# Patient Record
Sex: MALE | Race: WHITE | Employment: OTHER | URBAN - METROPOLITAN AREA
[De-identification: names, ages, dates, MRNs, and addresses within clinical notes are randomized per-mention and may not be internally consistent; named-entity substitution may affect disease eponyms.]

---

## 2017-06-09 DIAGNOSIS — Z76.0 ENCOUNTER FOR MEDICATION REFILL: ICD-10-CM

## 2017-06-09 RX ORDER — ALBUTEROL SULFATE 90 UG/1
AEROSOL, METERED RESPIRATORY (INHALATION)
Qty: 18 INHALER | Refills: 1 | Status: SHIPPED | OUTPATIENT
Start: 2017-06-09 | End: 2017-07-21

## 2017-07-21 DIAGNOSIS — Z76.0 ENCOUNTER FOR MEDICATION REFILL: ICD-10-CM

## 2017-07-21 RX ORDER — ALBUTEROL SULFATE 90 UG/1
AEROSOL, METERED RESPIRATORY (INHALATION)
Qty: 18 INHALER | Refills: 1 | Status: SHIPPED | OUTPATIENT
Start: 2017-07-21 | End: 2017-08-14

## 2017-07-21 NOTE — TELEPHONE ENCOUNTER
ventolin inhaler---has medication check appointment scheduled for 8/11/17 and is going out of town as soon as this can be refilled.

## 2017-08-14 ENCOUNTER — OFFICE VISIT (OUTPATIENT)
Dept: FAMILY MEDICINE | Facility: CLINIC | Age: 72
End: 2017-08-14

## 2017-08-14 VITALS
HEART RATE: 88 BPM | SYSTOLIC BLOOD PRESSURE: 122 MMHG | BODY MASS INDEX: 23.51 KG/M2 | WEIGHT: 165 LBS | OXYGEN SATURATION: 92 % | DIASTOLIC BLOOD PRESSURE: 80 MMHG

## 2017-08-14 DIAGNOSIS — J41.8 MIXED SIMPLE AND MUCOPURULENT CHRONIC BRONCHITIS (H): Primary | ICD-10-CM

## 2017-08-14 DIAGNOSIS — J44.1 COPD EXACERBATION (H): ICD-10-CM

## 2017-08-14 DIAGNOSIS — M1A.0790 IDIOPATHIC CHRONIC GOUT OF FOOT WITHOUT TOPHUS, UNSPECIFIED LATERALITY: ICD-10-CM

## 2017-08-14 LAB
% GRANULOCYTES: 70.8 % (ref 42.2–75.2)
HCT VFR BLD AUTO: 41.1 % (ref 39–51)
HEMOGLOBIN: 13.8 G/DL (ref 13.4–17.5)
LYMPHOCYTES NFR BLD AUTO: 11.9 % (ref 20.5–51.1)
MCH RBC QN AUTO: 32.3 PG (ref 27–31)
MCHC RBC AUTO-ENTMCNC: 33.6 G/DL (ref 33–37)
MCV RBC AUTO: 96.1 FL (ref 80–100)
MONOCYTES NFR BLD AUTO: 17.3 % (ref 1.7–9.3)
PLATELET # BLD AUTO: 183 K/UL (ref 140–450)
RBC # BLD AUTO: 4.27 X10/CMM (ref 4.2–5.9)
WBC # BLD AUTO: 4.6 X10/CMM (ref 3.8–11)

## 2017-08-14 PROCEDURE — 36415 COLL VENOUS BLD VENIPUNCTURE: CPT | Performed by: FAMILY MEDICINE

## 2017-08-14 PROCEDURE — 85025 COMPLETE CBC W/AUTO DIFF WBC: CPT | Performed by: FAMILY MEDICINE

## 2017-08-14 PROCEDURE — 99214 OFFICE O/P EST MOD 30 MIN: CPT | Performed by: FAMILY MEDICINE

## 2017-08-14 PROCEDURE — 80053 COMPREHEN METABOLIC PANEL: CPT | Mod: 90 | Performed by: FAMILY MEDICINE

## 2017-08-14 RX ORDER — ALBUTEROL SULFATE 0.83 MG/ML
1 SOLUTION RESPIRATORY (INHALATION) EVERY 6 HOURS
Qty: 90 VIAL | Refills: 3 | Status: SHIPPED | OUTPATIENT
Start: 2017-08-14 | End: 2020-03-09 | Stop reason: ALTCHOICE

## 2017-08-14 NOTE — PROGRESS NOTES
Problem(s) Oriented visit        SUBJECTIVE:                                                    Jah Tate is a 72 year old male who presents to clinic today for the following health issues :    1. 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  Has not had any recent breathing troubles beyond usual baseline.  Has not any acute respiratory events.  Remains with intermittent cough, mild shortness of breath with overexertion as per usual.  Using medication as directed with reported side effects.      2. Idiopathic chronic gout of foot without tophus, unspecified laterality  No recent gout flares, tolerating the allopurinol well with no side effects.           Problem list, Medication list, Allergies, and Medical/Social/Surgical histories reviewed in EPIC and updated as appropriate.   Additional history: as documented    ROS:  General and CV completed and negative except as noted above    Histories:   Patient Active Problem List   Diagnosis     COPD (chronic obstructive pulmonary disease) (H)     Health Care Home     Influenza B     Past Surgical History:   Procedure Laterality Date     FRACTURE TX, ANKLE RT/LT      left- w/plate       Social History   Substance Use Topics     Smoking status: Current Every Day Smoker     Packs/day: 0.25     Years: 50.00     Types: Cigarettes     Smokeless tobacco: Never Used      Comment: cutting down     Alcohol use No      Comment: 4-5 drinks per month     No family history on file.        OBJECTIVE:                                                    /80  Pulse 88  Wt 74.8 kg (165 lb)  SpO2 92%  BMI 23.51 kg/m2  Body mass index is 23.51 kg/(m^2).   APPEARANCE: = Relaxed and in no distress  Conj/Eyelids = noninjected and lids and lashes are without inflammation  PERRLA/Irises = Pupils Round Reactive to Light and Irisis without inflammation  Neck = No anterior or posterior adenopathy appreciated.  Thyroid = Not enlarged and no masses  felt  Resp effort = Calm regular breathing  Breath Sounds =  distant breath sounds  Heart Rate, Rythym, & sounds (no Murm)  = Regular rate and rythym with no S3, S4, or murmer appreciated.  Carotid Art's = Pulses full and equal and no bruits appreciated  Abdomen = Soft, nontender, no masses, & bowel sounds in all quadrants  Liver/Spleen = Normal span and no splenomegaly noted  Digits and Nails = FROM in all finger joints, no nail dystrophy  Ext (edema) = No pretibial edema noted or elsewhere  Musculsktl =  Strength and ROM of major joints are within normal limits  SKIN = absent significant rashes or lesions   Recent/Remote Memory = Alert and Oriented x 3  Mood/Affect = Cooperative and interested     ASSESSMENT/PLAN:                                                    Tobacco Cessation:   reports that he has been smoking Cigarettes.  He has a 12.50 pack-year smoking history. He has never used smokeless tobacco.  Tobacco Cessation Action Plan: Self help information given to patient    Declined immunizations: Td due to Concerns about side effects/safety    Jah was seen today for shortness of breath and recheck medication.    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 Diagnoses of Idiopathic chronic gout of foot without tophus, unspecified laterality and COPD exacerbation (H) were also pertinent to this visit.      Regular exercise  There are no Patient Instructions on file for this visit.    The following health maintenance items are reviewed in Epic and correct as of today:  Health Maintenance   Topic Date Due     TETANUS IMMUNIZATION (SYSTEM ASSIGNED)  01/08/1961     COLON CANCER SCREEN (SYSTEM  ASSIGNED)  01/08/1993     ADVANCE DIRECTIVE PLANNING Q5 YRS  01/08/1998     COPD ACTION PLAN Q1 YR  12/24/2014     FIT Q1 YR  03/31/2015     FALL RISK ASSESSMENT  12/21/2016     LIPID SCREEN Q5 YR MALE (SYSTEM ASSIGNED)  07/17/2017     INFLUENZA VACCINE (SYSTEM ASSIGNED)  09/01/2017     SPIROMETRY ONETIME  Completed     PNEUMOCOCCAL  Completed     AORTIC ANEURYSM SCREENING (SYSTEM ASSIGNED)  Completed       Eric Johnson MD  [facility]  [facility]  [phone]    For any issues my office # is [phone]

## 2017-08-14 NOTE — MR AVS SNAPSHOT
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  Normal or non-critical lab and imaging results will be communicated to you by [employer], letter or phone within 4 business days after the clinic has received the results. If you do not hear from us within 7 days, please contact the clinic through [employer] or phone. If you have a critical or abnormal lab result, we will notify you by phone as soon as possible.  Submit refill requests through [employer] or call your pharmacy and they will forward the refill request to us. Please allow 3 business days for your refill to be completed.          Additional Information About Your Visit        MyChart Information     [employer] lets you send messages to your doctor, view your test results, renew your prescriptions, schedule appointments and more. To sign up, go to [email or web address] . Click on \"Log in\" on the left side of the screen, which will take you to the Welcome page. Then click on \"Sign up Now\" on the right side of the page.     You will be asked to enter the access code listed below, as well as some personal information. Please follow the directions to create your username and password.     Your access code is: [email or web address]  Expires: 2017 11:55 AM     Your access code will  in 90 days. If you need help or a new code, please " call your [place] clinic or [phone].        Care EveryWhere ID     This is your Care EveryWhere ID. This could be used by other organizations to access your [place] medical records  [id number]        Your Vitals Were     Pulse Pulse Oximetry BMI (Body Mass Index)             88 92% 23.51 kg/m2          Blood Pressure from Last 3 Encounters:   08/14/17 122/80   06/14/16 138/60   02/09/16 150/70    Weight from Last 3 Encounters:   08/14/17 74.8 kg (165 lb)   06/14/16 79.4 kg (175 lb)   02/09/16 82.6 kg (182 lb)              Today, you had the following     No orders found for display         Where to get your medicines      These medications were sent to [id number] - [place], [place] - [address]  [address] [address]     Phone:  [phone]     albuterol (2.5 MG/3ML) 0.083% neb solution          Primary Care Provider Office Phone # Fax #    Eric Johnson [phone] [address]  [facility] [place] [address]        Equal Access to Services     [facility] : Hadii aad ku hadlaurao Soswathi, waaxda luqadaha, qaybta kaalmada adeangelayada, toni martínez. So [facility] [phone].    ATENCIÓN: Si habla español, tiene a walters disposición servicios gratuitos de asistencia lingüística. Anat al [phone].    We comply with applicable federal civil rights laws and [place] laws. We do not discriminate on the basis of race, color, national origin, age, disability sex, sexual orientation or gender identity.            Thank you!     Thank you for choosing [facility]  for your care. Our goal is always to provide you with excellent care. Hearing back from our patients is one way we can continue to improve our services. Please take a few minutes to complete the written survey that you may receive in the mail after your visit with us. Thank you!             Your Updated Medication List - Protect others around you: Learn how to safely use,  store and throw away your medicines at www.disposemymeds.org.          This list is accurate as of: 8/14/17 11:55 AM.  Always use your most recent med list.                   Brand Name Dispense Instructions for use Diagnosis    ADVAIR DISKUS 250-50 MCG/DOSE diskus inhaler   Generic drug:  fluticasone-salmeterol     3 Inhaler    INHALE 1 PUFF INTO THE LUNGS 2 TIMES DAILY    Encounter for medication refill       albuterol (2.5 MG/3ML) 0.083% neb solution     90 vial    Take 1 vial (2.5 mg) by nebulization every 6 hours    COPD exacerbation (H)       allopurinol 100 MG tablet    ZYLOPRIM    90 tablet    TAKE 1 TABLET (100 MG) BY MOUTH DAILY    Encounter for medication refill       aspirin 81 MG tablet      Take 1 tablet by mouth daily        CENTRUM PO      Take 1 tablet by mouth daily.        colchicine 0.6 MG tablet    COLCRYS    60 tablet    Take 1 tablet (0.6 mg) by mouth 2 times daily    Encounter for medication refill       COMBIVENT RESPIMAT  MCG/ACT inhaler   Generic drug:  Ipratropium-Albuterol     1 Inhaler    INHALE 1 PUFF INTO THE LUNGS 4 TIMES DAILY NOT TO EXCEED 6 DOSES PER DAY.    Encounter for medication refill       DULCOLAX STOOL SOFTENER 100 MG capsule   Generic drug:  docusate sodium      Take 1 capsule by mouth daily as needed        LIVER DEFENSE PO      Take 1 capsule by mouth daily        MUCINEX 600 MG 12 hr tablet   Generic drug:  guaiFENesin     28 tablet    Take 2 tablets (1,200 mg) by mouth daily        triamcinolone 0.1 % cream    KENALOG    45 g    Apply  topically 3 times daily.    Contact dermatitis and other eczema due to plants (except food)       vitamin E 800 UNITS Caps      Take 1 tablet by mouth daily.

## 2017-08-15 LAB
ALBUMIN SERPL-MCNC: 3.6 G/DL (ref 3.5–4.8)
ALBUMIN/GLOB SERPL: 1.5 {RATIO} (ref 1.2–2.2)
ALP SERPL-CCNC: 71 IU/L (ref 39–117)
ALT SERPL-CCNC: 51 IU/L (ref 0–44)
AST SERPL-CCNC: 68 IU/L (ref 0–40)
BILIRUB SERPL-MCNC: 0.4 MG/DL (ref 0–1.2)
BUN SERPL-MCNC: 6 MG/DL (ref 8–27)
BUN/CREATININE RATIO: 11 (ref 10–24)
CALCIUM SERPL-MCNC: 8.6 MG/DL (ref 8.6–10.2)
CHLORIDE SERPLBLD-SCNC: 98 MMOL/L (ref 96–106)
CREAT SERPL-MCNC: 0.55 MG/DL (ref 0.76–1.27)
EGFR IF AFRICN AM: 119 ML/MIN/1.73
EGFR IF NONAFRICN AM: 103 ML/MIN/1.73
GLOBULIN, TOTAL: 2.4 G/DL (ref 1.5–4.5)
GLUCOSE SERPL-MCNC: 81 MG/DL (ref 65–99)
POTASSIUM SERPL-SCNC: 4.2 MMOL/L (ref 3.5–5.2)
PROT SERPL-MCNC: 6 G/DL (ref 6–8.5)
SODIUM SERPL-SCNC: 142 MMOL/L (ref 134–144)
TOTAL CO2: 30 MMOL/L (ref 18–28)

## 2017-08-21 ENCOUNTER — TELEPHONE (OUTPATIENT)
Dept: FAMILY MEDICINE | Facility: CLINIC | Age: 72
End: 2017-08-21

## 2017-08-21 DIAGNOSIS — R74.8 ELEVATED LIVER ENZYMES: Primary | ICD-10-CM

## 2017-08-21 NOTE — TELEPHONE ENCOUNTER
----- Message from Eric Johnson MD sent at 8/21/2017  8:07 AM CDT -----  His LFT's are slightly elevated and I would like to recheck them in a few weeks.  KN

## 2017-08-21 NOTE — TELEPHONE ENCOUNTER
"Patient informed of lab results and need for recheck.  Asked patient about etoh intake, states \"probably drink more than I should\" - he did not quantify. Agrees to abstain and repeat LFT's in 2 weeks. Future order in epic.  Sharri Browning RN  "

## 2017-08-25 DIAGNOSIS — Z76.0 ENCOUNTER FOR MEDICATION REFILL: ICD-10-CM

## 2017-08-25 RX ORDER — ALBUTEROL SULFATE 90 UG/1
AEROSOL, METERED RESPIRATORY (INHALATION)
Qty: 18 INHALER | Refills: 1 | Status: SHIPPED | OUTPATIENT
Start: 2017-08-25 | End: 2017-09-20

## 2017-09-20 ENCOUNTER — DOCUMENTATION ONLY (OUTPATIENT)
Dept: PHARMACY | Facility: PHYSICIAN GROUP | Age: 72
End: 2017-09-20

## 2017-09-20 DIAGNOSIS — J41.8 MIXED SIMPLE AND MUCOPURULENT CHRONIC BRONCHITIS (H): Primary | ICD-10-CM

## 2017-09-20 DIAGNOSIS — Z76.0 ENCOUNTER FOR MEDICATION REFILL: ICD-10-CM

## 2017-09-20 RX ORDER — ALBUTEROL SULFATE 90 UG/1
AEROSOL, METERED RESPIRATORY (INHALATION)
Qty: 18 G | Refills: 1 | Status: SHIPPED | OUTPATIENT
Start: 2017-09-20 | End: 2018-01-13

## 2017-09-20 NOTE — PROGRESS NOTES
Clinical Consult:                                                    Jah Tate is a 72 year old male called for a clinical pharmacist consult.  He was referred to me from Yehuda- .     Reason for Consult: Pt calling for more refills on his ventolin, but should have some left over and is using very frequently. MTM asked to contact patient on inhaler use. Pt is currently out of scope for MTM.     Discussion: Patient is taking Advair 250/50 BID, Combivent 1-2 times per day and Ventolin 4 times per day or more as needed for shortness of breath.   He reports the Ventolin works the best, that is why he is using it more.  He just filled 3 months of the Combivent inhaler, but is NOT taking is scheduled at this time.    Discussed ingredients of the inhalers- should continue to use Advair BID, suggest scheduled use of Combivent QID and limit Ventolin for rare use.     After using up the Combivent (closer to December) he should be switched to LAMA inhaler such as Incruse or Spiriva for controller with the Ventolin PRN.     Plan:  1. Schedule use of Combivent QID  2. Refill for ventolin ordered, but encouraged limiting for PRN use  3. Consider switching Combivent to Incruse in December when out of current supply.    All changes made per CPA with Dr. Johnson.     Radha Bejarano, Pharm.D, [facility]  Medication Therapy Management Pharmacist  [phone]

## 2017-09-21 RX ORDER — ALBUTEROL SULFATE 90 UG/1
AEROSOL, METERED RESPIRATORY (INHALATION)
Qty: 18 INHALER | Refills: 1 | Status: SHIPPED | OUTPATIENT
Start: 2017-09-21 | End: 2020-03-09

## 2017-09-25 DIAGNOSIS — Z76.0 ENCOUNTER FOR MEDICATION REFILL: ICD-10-CM

## 2017-10-13 DIAGNOSIS — Z23 NEED FOR PROPHYLACTIC VACCINATION AND INOCULATION AGAINST INFLUENZA: Primary | ICD-10-CM

## 2017-10-13 PROCEDURE — 90662 IIV NO PRSV INCREASED AG IM: CPT | Performed by: FAMILY MEDICINE

## 2017-10-13 PROCEDURE — G0008 ADMIN INFLUENZA VIRUS VAC: HCPCS | Performed by: FAMILY MEDICINE

## 2017-10-13 NOTE — PROGRESS NOTES
Injectable Influenza Immunization Documentation    1.  Is the person to be vaccinated sick today?   No    2. Does the person to be vaccinated have an allergy to a component   of the vaccine?   No    3. Has the person to be vaccinated ever had a serious reaction   to influenza vaccine in the past?   No    4. Has the person to be vaccinated ever had Guillain-Barré syndrome?   No    Form completed by darlene crump

## 2017-12-26 DIAGNOSIS — Z76.0 ENCOUNTER FOR MEDICATION REFILL: ICD-10-CM

## 2017-12-26 RX ORDER — COLCHICINE 0.6 MG/1
TABLET ORAL
Qty: 60 TABLET | Refills: 0 | Status: SHIPPED | OUTPATIENT
Start: 2017-12-26 | End: 2018-02-03

## 2017-12-27 DIAGNOSIS — J44.9 COPD (CHRONIC OBSTRUCTIVE PULMONARY DISEASE) (H): Primary | ICD-10-CM

## 2017-12-27 DIAGNOSIS — Z76.0 ENCOUNTER FOR MEDICATION REFILL: ICD-10-CM

## 2017-12-27 NOTE — TELEPHONE ENCOUNTER
COMBIVENT RESPIMAT  MCG/ACT inhaler   LAST  Med check 8/14/17   last labs(for RX) 8/14/17  Next  appt scheduled =  none  Deborah Hartman MA

## 2017-12-28 RX ORDER — IPRATROPIUM BROMIDE AND ALBUTEROL 20; 100 UG/1; UG/1
SPRAY, METERED RESPIRATORY (INHALATION)
Qty: 12 G | Refills: 3 | Status: SHIPPED | OUTPATIENT
Start: 2017-12-28 | End: 2018-03-01

## 2018-01-13 DIAGNOSIS — J41.8 MIXED SIMPLE AND MUCOPURULENT CHRONIC BRONCHITIS (H): ICD-10-CM

## 2018-01-14 RX ORDER — ALBUTEROL SULFATE 90 UG/1
AEROSOL, METERED RESPIRATORY (INHALATION)
Qty: 18 INHALER | Refills: 1 | Status: SHIPPED | OUTPATIENT
Start: 2018-01-14 | End: 2018-02-22

## 2018-02-22 DIAGNOSIS — J41.8 MIXED SIMPLE AND MUCOPURULENT CHRONIC BRONCHITIS (H): ICD-10-CM

## 2018-02-22 RX ORDER — ALBUTEROL SULFATE 90 UG/1
AEROSOL, METERED RESPIRATORY (INHALATION)
Qty: 18 INHALER | Refills: 1 | Status: SHIPPED | OUTPATIENT
Start: 2018-02-22 | End: 2018-04-05

## 2018-02-22 NOTE — TELEPHONE ENCOUNTER
VENTOLIN  (90 BASE) MCG/ACT Inhaler   LAST  Med check 8/14/17   last labs(for RX) 8/14/17  Next  appt scheduled =  2/26/18 med ck  Deborah Hartman MA

## 2018-03-01 ENCOUNTER — OFFICE VISIT (OUTPATIENT)
Dept: FAMILY MEDICINE | Facility: CLINIC | Age: 73
End: 2018-03-01

## 2018-03-01 VITALS
SYSTOLIC BLOOD PRESSURE: 130 MMHG | RESPIRATION RATE: 16 BRPM | HEART RATE: 99 BPM | OXYGEN SATURATION: 90 % | BODY MASS INDEX: 25.02 KG/M2 | DIASTOLIC BLOOD PRESSURE: 70 MMHG | WEIGHT: 175.6 LBS

## 2018-03-01 DIAGNOSIS — J44.1 COPD EXACERBATION (H): ICD-10-CM

## 2018-03-01 DIAGNOSIS — Z12.11 SPECIAL SCREENING FOR MALIGNANT NEOPLASMS, COLON: Primary | ICD-10-CM

## 2018-03-01 PROCEDURE — 99214 OFFICE O/P EST MOD 30 MIN: CPT | Performed by: FAMILY MEDICINE

## 2018-03-01 RX ORDER — AZITHROMYCIN 250 MG/1
TABLET, FILM COATED ORAL
Qty: 6 TABLET | Refills: 0 | Status: SHIPPED | OUTPATIENT
Start: 2018-03-01 | End: 2019-05-07

## 2018-03-01 NOTE — PROGRESS NOTES
.  Problem(s) Oriented visit        SUBJECTIVE:                                                    Jah Tate is a 73 year old male who presents to clinic today for the following health issues :  1. Special screening for malignant neoplasms, colon  due  - ABSTRACT COLOGUARD-NO CHARGE    2. COPD exacerbation (H)  positive for cough, dyspnea, wheezing; negative for hemoptysis  Long discussion about smoking.  - azithromycin (ZITHROMAX) 250 MG tablet; Two tablets first day, then one tablet daily for four days.  Dispense: 6 tablet; Refill: 0         Problem list, Medication list, Allergies, and Medical/Social/Surgical histories reviewed in [facility] and updated as appropriate.   Additional history: as documented    ROS:  General and Resp. completed and negative except as noted above    Histories:   Patient Active Problem List   Diagnosis     COPD (chronic obstructive pulmonary disease) (H)     Health Care Home     Influenza B     Past Surgical History:   Procedure Laterality Date     FRACTURE TX, ANKLE RT/LT      left- w/plate       Social History   Substance Use Topics     Smoking status: Current Every Day Smoker     Packs/day: 0.25     Years: 50.00     Types: Cigarettes     Smokeless tobacco: Never Used      Comment: cutting down     Alcohol use No      Comment: 4-5 drinks per month     No family history on file.        OBJECTIVE:                                                    /70  Pulse 99  Resp 16  Wt 79.7 kg (175 lb 9.6 oz)  SpO2 90%  BMI 25.02 kg/m2  Body mass index is 25.02 kg/(m^2).   APPEARANCE: = Relaxed and in no distress  Conj/Eyelids = noninjected and lids and lashes are without inflammation  PERRLA/Irises = Pupils Round Reactive to Light and Irisis without inflammation  Ears/Nose = External structures and Nares have usual shape and form  Ear canals and TM's = Canals are not inflammed and have none or little wax and the drums are not injected and have a light reflex   Lips/Teeth/Gums = No  lesions seen, good dentition, and gums seem healthy  Oropharynx = No leukoplakia, No injection to the tissues, Normal Uvula  Neck = No anterior or posterior adenopathy appreciated.  Resp effort =  Mild Distress  Breath Sounds =  scattered rhonchi, bilateral wheezing and distant breath sounds  Mood/Affect = Cooperative and interested     ASSESSMENT/PLAN:                                                      Jah was seen today for recheck medication and copd.    Diagnoses and all orders for this visit:    Special screening for malignant neoplasms, colon    COPD exacerbation (H)  -     azithromycin (ZITHROMAX) 250 MG tablet; Two tablets first day, then one tablet daily for four days.      >25 min spent with patient, greater than 50% spent on discussion/education/planning, etc. About The primary encounter diagnosis was Special screening for malignant neoplasms, colon. A diagnosis of COPD exacerbation (H) was also pertinent to this visit.      Work on weight loss    The following health maintenance items are reviewed in Epic and correct as of today:  Health Maintenance   Topic Date Due     COPD ACTION PLAN Q1 YR  12/24/2014     FIT Q1 YR  03/31/2015     LIPID SCREEN Q5 YR MALE (SYSTEM ASSIGNED)  07/17/2017     INFLUENZA VACCINE (SYSTEM ASSIGNED)  09/01/2018     FALL RISK ASSESSMENT  03/01/2019     ADVANCE DIRECTIVE PLANNING Q5 YRS  03/01/2023     TETANUS IMMUNIZATION (SYSTEM ASSIGNED)  06/11/2026     SPIROMETRY ONETIME  Completed     PNEUMOCOCCAL  Completed     AORTIC ANEURYSM SCREENING (SYSTEM ASSIGNED)  Completed       Eric Johnson MD  [facility]  Family Practice  [facility]  [phone]    For any issues my office # is [phone]

## 2018-03-01 NOTE — MR AVS SNAPSHOT
After Visit Summary   3/1/2018    Jah Tate    MRN: [id number]           Patient Information     Date Of Birth          1945        Visit Information        Provider Department      3/1/2018 3:30 PM Eric Johnson MD [facility]        Today's Diagnoses     Special screening for malignant neoplasms, colon    -  1    COPD exacerbation (H)          Care Instructions    TIPS FOR QUITTING  There are more than 37 million ex-smokers in the [place] Each one had to make the same decision you re thinking about now.  Smoking cigarettes is an expensive and destructive habit. It s time to stop.  You ve probably heard of all the reasons why you should quit, so we won t dwell on them here. However, you should reflect on the benefits of quitting. When you quit smoking, the body starts to repair itself almost immediately, unless damage has been done that cannot be reversed. Familiar symptoms like shortness of breath, sinus troubles, persistent cough start to disappear.  PREPARING TO QUIT  1. Ask yourself 3 key questions: How much do I smoke? Why do I smoke? What will be my most difficult christopher in quitting?  2. If you re feeling ambivalent about quitting, ask yourself which you want most: to smoke or to stop (Remember, you don t have to get rid of the desire to smoke before stopping)  3. Choose a method of quitting. Cold turkey is the most successful, but a gradual approach is fine.  4. Set a final quit date.  WAYS TO CUT DOWN YOUR SMOKING DAY BY DAY  (NOTE: Do not allow this gradual approach to become a way of procrastinating, rather than quitting)  1. Decide to cut down by a certain number of cigarettes per day, and increase your reduction by that number each succeeding day. OR postpone the 1st cigarette of the day by an hour and extend that time daily.  2. Make it hard to get and smoke a cigarette. Wrap up the package and put elastic bands around it. Smoke with your left hand if you  usually smoke with your right.  3. Change to a brand you don t like.  Buy only one pack @ a time.  4. If you always have a smoke with your coffee, switch to tea, juice or soda.  5. Do something for your body. Get into shape. Exercise is great for relaxation.  6. Call your friends and tell them you re going to quit.(Choose to tell the friends who will offer only positive reinforcement.)  7. If you quit for one day, you can quit for another. Try it.  8. Save all the money you would have spent on cigarettes and buy yourself something special.  You deserve it.  9. If you break down and have a cigarette, don t give up. Some people take several tries before they make it. Just don t have a 2nd cigarette.  ON THE DAY YOU QUIT  1. Throw away all cigarettes and matches. Hide Lighters and ashtrays  2. Visit the dentist and have your teeth cleaned to get rid of the tobacco stains. Notice how nice they look and resolve to keep them that way.  3. Make a list of things you d like to buy yourself or someone else. Estimate the cost in terms of packs of cigarettes and out the money aside to buy these presents.  4. Keep very busy on the big day. Go to the movies, exercise, take long walks, go bike riding.  5. Buy yourself a treat or do something special to celebrate.  TIPS FOR STAYING QUIT  1. For the 1st few days after you quit, spend as much time as possible in places where smoking is prohibited-libraries,museums,theaters,churches.  2. Drink large quantities of water and fruit juice  3. Avoid alcohol, coffee and other beverages that you associate with smoking.  4. Strike up a conversation instead of a match for a cigarette.  5. If you miss the sensation of having a cigarette in your hand, play with something else like a pencil, a paper clip or marble.  6. If you miss having something in our mouth ,try toothpicks,cinnamon,celery or carrots sticks.  7. AVOID TEMPTATION: Stay away from situations you associate with smoking.  8. FIND  NEW HABITS and develop a non-smoking environment around you.  9. Stress constructive, not destructive thinking to lesson discomfort.  10. Avoid resuming the habit by anticipating future situations/crises that might lead to smoking and assert your reasons for not giving in  11. Take deep rhythmic breaths, similar to smoking, to relax.  12. Remember your goal and the fact that the urge will eventually pass.  13. Think positive thoughts and avoid negative ones.  14. Brush your teeth  15. Do brief exercise (isometrics,push-ups,deep knee bends,walk up a flight of stairs)  16. Call a supportive friend  17. Compile a list of  Urge Activities   and start at the top when it hits  18. Eat several small meals. This maintains constant blood sugar levels and helps prevent the urge to smoke. Avoid sugary or spicy foods that trigger a desire for cigarettes.  19. Above all, reward yourself. Plan to do something fun for doing your best  WHEN YOU GET THE   CRAZIES    1. Keep oral substitutes handy: carrots,pickles,apples,celery,raisins or gum.  2. Take 10 deep breaths, hold the last one while lighting  a match. Exhale slowly, and blow out the match. Pretend it is a cigarette and out it out in an ashtray.  3. Take a bath or shower if possible.  4. Learn to relax quickly and deeply.Make yourself limp. Visualize a soothing pleasing situation and get away from it all.  5. Light incense or a candle, instead of a cigarette.  6. Never allow yourself to think that  one won t hurt , because it will.                    Follow-ups after your visit        Who to contact     If you have questions or need follow up information about today's clinic visit or your schedule please contact [facility] directly at [phone].  Normal or non-critical lab and imaging results will be communicated to you by MyChart, letter or phone within 4 business days after the clinic has received the results. If you do not hear from us within 7 days,  "please contact the clinic through [employer] or phone. If you have a critical or abnormal lab result, we will notify you by phone as soon as possible.  Submit refill requests through [employer] or call your pharmacy and they will forward the refill request to us. Please allow 3 business days for your refill to be completed.          Additional Information About Your Visit        [employer] Information     [employer] lets you send messages to your doctor, view your test results, renew your prescriptions, schedule appointments and more. To sign up, go to [email or web address] . Click on \"Log in\" on the left side of the screen, which will take you to the Welcome page. Then click on \"Sign up Now\" on the right side of the page.     You will be asked to enter the access code listed below, as well as some personal information. Please follow the directions to create your username and password.     Your access code is: [email or web address]  Expires: 2018  4:03 PM     Your access code will  in 90 days. If you need help or a new code, please call your [place] clinic or [phone].        Care EveryWhere ID     This is your Care EveryWhere ID. This could be used by other organizations to access your [place] medical records  [id number]        Your Vitals Were     Pulse Respirations Pulse Oximetry BMI (Body Mass Index)          99 16 90% 25.02 kg/m2         Blood Pressure from Last 3 Encounters:   18 130/70   17 122/80   16 138/60    Weight from Last 3 Encounters:   18 79.7 kg (175 lb 9.6 oz)   17 74.8 kg (165 lb)   16 79.4 kg (175 lb)              We Performed the Following     ABSTRACT COLOGUARD-NO CHARGE          Today's Medication Changes          These changes are accurate as of 3/1/18  4:03 PM.  If you have any questions, ask your nurse or doctor.               Start taking these medicines.        Dose/Directions    azithromycin 250 MG tablet   Commonly known as:  ZITHROMAX   Used for:  " COPD exacerbation (H)        Two tablets first day, then one tablet daily for four days.   Quantity:  6 tablet   Refills:  0            Where to get your medicines      These medications were sent to [facility]/pharmacy #[id number] - [place], [place] - [address]  [address] [address]     Phone:  [phone]     azithromycin 250 MG tablet                Primary Care Provider Office Phone # Fax #    Eric Johnson [phone] [address]  [facility] [address]        Equal Access to Services     [facility]: Hadii aad ku hadasho Soomaali, waaxda luqadaha, qaybta kaalmada adeegyada, waxsyd serrato . So [facility] [phone].    ATENCIÓN: Si habla español, tiene a walters disposición servicios gratuitos de asistencia lingüística. [place] [phone].    We comply with applicable federal civil rights laws and [place] laws. We do not discriminate on the basis of race, color, national origin, age, disability, sex, sexual orientation, or gender identity.            Thank you!     Thank you for choosing [facility]  for your care. Our goal is always to provide you with excellent care. Hearing back from our patients is one way we can continue to improve our services. Please take a few minutes to complete the written survey that you may receive in the mail after your visit with us. Thank you!             Your Updated Medication List - Protect others around you: Learn how to safely use, store and throw away your medicines at www.disposemymeds.org.          This list is accurate as of 3/1/18  4:03 PM.  Always use your most recent med list.                   Brand Name Dispense Instructions for use Diagnosis    ADVAIR DISKUS 250-50 MCG/DOSE diskus inhaler   Generic drug:  fluticasone-salmeterol     3 Inhaler    INHALE 1 PUFF INTO THE LUNGS 2 TIMES DAILY    Encounter for medication refill       * albuterol (2.5 MG/3ML) 0.083% neb solution     90 vial    Take 1  vial (2.5 mg) by nebulization every 6 hours    COPD exacerbation (H)       * VENTOLIN  (90 BASE) MCG/ACT Inhaler   Generic drug:  albuterol     18 Inhaler    INHALE 2 PUFFS BY MOUTH EVERY 6 HOURS AS NEEDED FOR SHORTNESS OF BREATH OR WHEEZING    Encounter for medication refill       * VENTOLIN  (90 BASE) MCG/ACT Inhaler   Generic drug:  albuterol     18 Inhaler    INHALE 2 PUFFS BY MOUTH EVERY 6 HOURS AS NEEDED FOR SHORTNESS OF BREATH OR WHEEZING    Mixed simple and mucopurulent chronic bronchitis (H)       allopurinol 100 MG tablet    ZYLOPRIM    30 tablet    TAKE 1 TABLET (100 MG) BY MOUTH DAILY    Encounter for medication refill       aspirin 81 MG tablet      Take 1 tablet by mouth daily        azithromycin 250 MG tablet    ZITHROMAX    6 tablet    Two tablets first day, then one tablet daily for four days.    COPD exacerbation (H)       CENTRUM PO      Take 1 tablet by mouth daily.        COLCRYS 0.6 MG tablet   Generic drug:  colchicine     60 tablet    TAKE 1 TABLET BY MOUTH TWICE A DAY    Encounter for medication refill       COMBIVENT RESPIMAT  MCG/ACT inhaler   Generic drug:  Ipratropium-Albuterol     1 Inhaler    INHALE 1 PUFF INTO THE LUNGS 4 TIMES DAILY NOT TO EXCEED 6 DOSES PER DAY.    Encounter for medication refill       DULCOLAX STOOL SOFTENER 100 MG capsule   Generic drug:  docusate sodium      Take 1 capsule by mouth daily as needed        LIVER DEFENSE PO      Take 1 capsule by mouth daily        MUCINEX 600 MG 12 hr tablet   Generic drug:  guaiFENesin     28 tablet    Take 2 tablets (1,200 mg) by mouth daily        triamcinolone 0.1 % cream    KENALOG    45 g    Apply  topically 3 times daily.    Contact dermatitis and other eczema due to plants (except food)       vitamin E 800 UNITS Caps      Take 1 tablet by mouth daily.        * Notice:  This list has 3 medication(s) that are the same as other medications prescribed for you. Read the directions carefully, and ask your  doctor or other care provider to review them with you.

## 2018-03-01 NOTE — PATIENT INSTRUCTIONS
TIPS FOR QUITTING  There are more than 37 million ex-smokers in the [place] Each one had to make the same decision you re thinking about now.  Smoking cigarettes is an expensive and destructive habit. It s time to stop.  You ve probably heard of all the reasons why you should quit, so we won t dwell on them here. However, you should reflect on the benefits of quitting. When you quit smoking, the body starts to repair itself almost immediately, unless damage has been done that cannot be reversed. Familiar symptoms like shortness of breath, sinus troubles, persistent cough start to disappear.  PREPARING TO QUIT  1. Ask yourself 3 key questions: How much do I smoke? Why do I smoke? What will be my most difficult christopher in quitting?  2. If you re feeling ambivalent about quitting, ask yourself which you want most: to smoke or to stop (Remember, you don t have to get rid of the desire to smoke before stopping)  3. Choose a method of quitting. Cold turkey is the most successful, but a gradual approach is fine.  4. Set a final quit date.  WAYS TO CUT DOWN YOUR SMOKING DAY BY DAY  (NOTE: Do not allow this gradual approach to become a way of procrastinating, rather than quitting)  1. Decide to cut down by a certain number of cigarettes per day, and increase your reduction by that number each succeeding day. OR postpone the 1st cigarette of the day by an hour and extend that time daily.  2. Make it hard to get and smoke a cigarette. Wrap up the package and put elastic bands around it. Smoke with your left hand if you usually smoke with your right.  3. Change to a brand you don t like.  Buy only one pack @ a time.  4. If you always have a smoke with your coffee, switch to tea, juice or soda.  5. Do something for your body. Get into shape. Exercise is great for relaxation.  6. Call your friends and tell them you re going to quit.(Choose to tell the friends who will offer only positive reinforcement.)  7. If you quit for one day,  you can quit for another. Try it.  8. Save all the money you would have spent on cigarettes and buy yourself something special.  You deserve it.  9. If you break down and have a cigarette, don t give up. Some people take several tries before they make it. Just don t have a 2nd cigarette.  ON THE DAY YOU QUIT  1. Throw away all cigarettes and matches. Hide Lighters and ashtrays  2. Visit the dentist and have your teeth cleaned to get rid of the tobacco stains. Notice how nice they look and resolve to keep them that way.  3. Make a list of things you d like to buy yourself or someone else. Estimate the cost in terms of packs of cigarettes and out the money aside to buy these presents.  4. Keep very busy on the big day. Go to the movies, exercise, take long walks, go bike riding.  5. Buy yourself a treat or do something special to celebrate.  TIPS FOR STAYING QUIT  1. For the 1st few days after you quit, spend as much time as possible in places where smoking is prohibited-libraries,museums,theaters,churches.  2. Drink large quantities of water and fruit juice  3. Avoid alcohol, coffee and other beverages that you associate with smoking.  4. Strike up a conversation instead of a match for a cigarette.  5. If you miss the sensation of having a cigarette in your hand, play with something else like a pencil, a paper clip or marble.  6. If you miss having something in our mouth ,try toothpicks,cinnamon,celery or carrots sticks.  7. AVOID TEMPTATION: Stay away from situations you associate with smoking.  8. FIND NEW HABITS and develop a non-smoking environment around you.  9. Stress constructive, not destructive thinking to lesson discomfort.  10. Avoid resuming the habit by anticipating future situations/crises that might lead to smoking and assert your reasons for not giving in  11. Take deep rhythmic breaths, similar to smoking, to relax.  12. Remember your goal and the fact that the urge will eventually pass.  13. Think  positive thoughts and avoid negative ones.  14. Brush your teeth  15. Do brief exercise (isometrics,push-ups,deep knee bends,walk up a flight of stairs)  16. Call a supportive friend  17. Compile a list of  Urge Activities   and start at the top when it hits  18. Eat several small meals. This maintains constant blood sugar levels and helps prevent the urge to smoke. Avoid sugary or spicy foods that trigger a desire for cigarettes.  19. Above all, reward yourself. Plan to do something fun for doing your best  WHEN YOU GET THE   CRAZIES    1. Keep oral substitutes handy: carrots,pickles,apples,celery,raisins or gum.  2. Take 10 deep breaths, hold the last one while lighting  a match. Exhale slowly, and blow out the match. Pretend it is a cigarette and out it out in an ashtray.  3. Take a bath or shower if possible.  4. Learn to relax quickly and deeply.Make yourself limp. Visualize a soothing pleasing situation and get away from it all.  5. Light incense or a candle, instead of a cigarette.  6. Never allow yourself to think that  one won t hurt , because it will.

## 2018-03-08 DIAGNOSIS — Z76.0 ENCOUNTER FOR MEDICATION REFILL: ICD-10-CM

## 2018-03-08 RX ORDER — ALLOPURINOL 100 MG/1
TABLET ORAL
Qty: 30 TABLET | Refills: 0 | Status: SHIPPED | OUTPATIENT
Start: 2018-03-08 | End: 2020-03-09

## 2018-04-01 DIAGNOSIS — Z76.0 ENCOUNTER FOR MEDICATION REFILL: ICD-10-CM

## 2018-04-01 RX ORDER — COLCHICINE 0.6 MG/1
TABLET ORAL
Qty: 60 TABLET | Refills: 0 | Status: SHIPPED | OUTPATIENT
Start: 2018-04-01 | End: 2018-06-03

## 2018-04-04 ENCOUNTER — TELEPHONE (OUTPATIENT)
Dept: FAMILY MEDICINE | Facility: CLINIC | Age: 73
End: 2018-04-04

## 2018-04-05 DIAGNOSIS — J41.8 MIXED SIMPLE AND MUCOPURULENT CHRONIC BRONCHITIS (H): ICD-10-CM

## 2018-04-05 RX ORDER — ALBUTEROL SULFATE 90 UG/1
AEROSOL, METERED RESPIRATORY (INHALATION)
Qty: 18 INHALER | Refills: 1 | Status: SHIPPED | OUTPATIENT
Start: 2018-04-05 | End: 2018-06-05

## 2018-04-05 NOTE — TELEPHONE ENCOUNTER
VENTOLIN  (90 BASE) MCG/ACT Inhaler   LAST  Med check 3/1/18   last labs(for RX) 8/14/17  Next  appt scheduled =  none  Deborah Hartman MA

## 2018-04-05 NOTE — TELEPHONE ENCOUNTER
received form from [facility]--We do not refill prescriptions for oxygen machines, just supplies.  Form was faxed back to them with this infomation.    Yehuda Plummer,   [facility]  [phone]

## 2018-06-03 DIAGNOSIS — Z76.0 ENCOUNTER FOR MEDICATION REFILL: ICD-10-CM

## 2018-06-04 RX ORDER — COLCHICINE 0.6 MG/1
TABLET, FILM COATED ORAL
Qty: 60 TABLET | Refills: 0 | Status: SHIPPED | OUTPATIENT
Start: 2018-06-04 | End: 2018-09-29

## 2018-06-05 DIAGNOSIS — J41.8 MIXED SIMPLE AND MUCOPURULENT CHRONIC BRONCHITIS (H): ICD-10-CM

## 2018-06-05 RX ORDER — ALBUTEROL SULFATE 90 UG/1
AEROSOL, METERED RESPIRATORY (INHALATION)
Qty: 18 INHALER | Refills: 1 | Status: SHIPPED | OUTPATIENT
Start: 2018-06-05 | End: 2018-07-10

## 2018-07-03 NOTE — PROGRESS NOTES
Received fax from [employer] [facility] on 3/1/18.  They are suspending the order for inactivity (60 days without activity).  The order is good for one year and they do not want us to send another order with in the year.  Patient will need to contact [facility] to get a new test mailed to them (if they do not have the first one).  Phone number for the patient is [phone]    Yehuda Plummer,   [facility]  [phone]

## 2018-07-10 DIAGNOSIS — J41.8 MIXED SIMPLE AND MUCOPURULENT CHRONIC BRONCHITIS (H): ICD-10-CM

## 2018-07-10 DIAGNOSIS — Z76.0 ENCOUNTER FOR MEDICATION REFILL: ICD-10-CM

## 2018-07-11 NOTE — TELEPHONE ENCOUNTER
VENTOLIN  (90 Base) MCG/ACT Inhaler  COMBIVENT RESPIMAT  MCG/ACT inhaler   LAST  Med check 3/1/18   last labs(for RX) 8/14/17  Next  appt scheduled =  none  Deborah Hartman MA

## 2018-07-12 RX ORDER — ALBUTEROL SULFATE 90 UG/1
AEROSOL, METERED RESPIRATORY (INHALATION)
Qty: 18 INHALER | Refills: 1 | Status: SHIPPED | OUTPATIENT
Start: 2018-07-12 | End: 2018-08-10

## 2018-07-12 RX ORDER — IPRATROPIUM BROMIDE AND ALBUTEROL 20; 100 UG/1; UG/1
SPRAY, METERED RESPIRATORY (INHALATION)
Qty: 3 INHALER | Refills: 3 | Status: SHIPPED | OUTPATIENT
Start: 2018-07-12 | End: 2019-01-27

## 2018-08-10 DIAGNOSIS — J41.8 MIXED SIMPLE AND MUCOPURULENT CHRONIC BRONCHITIS (H): ICD-10-CM

## 2018-08-12 RX ORDER — ALBUTEROL SULFATE 90 UG/1
AEROSOL, METERED RESPIRATORY (INHALATION)
Qty: 18 INHALER | Refills: 1 | Status: SHIPPED | OUTPATIENT
Start: 2018-08-12 | End: 2018-09-27

## 2018-09-27 DIAGNOSIS — J41.8 MIXED SIMPLE AND MUCOPURULENT CHRONIC BRONCHITIS (H): ICD-10-CM

## 2018-09-27 RX ORDER — ALBUTEROL SULFATE 90 UG/1
AEROSOL, METERED RESPIRATORY (INHALATION)
Qty: 3 INHALER | Refills: 1 | Status: SHIPPED | OUTPATIENT
Start: 2018-09-27 | End: 2018-12-31

## 2018-09-29 DIAGNOSIS — Z76.0 ENCOUNTER FOR MEDICATION REFILL: ICD-10-CM

## 2018-09-30 RX ORDER — COLCHICINE 0.6 MG/1
TABLET, FILM COATED ORAL
Qty: 60 TABLET | Refills: 0 | Status: SHIPPED | OUTPATIENT
Start: 2018-09-30 | End: 2019-01-25

## 2018-12-18 ENCOUNTER — TELEPHONE (OUTPATIENT)
Dept: FAMILY MEDICINE | Facility: CLINIC | Age: 73
End: 2018-12-18

## 2018-12-19 NOTE — TELEPHONE ENCOUNTER
Patient calling multiple times in past 2 days wanting Dr. Johnson to sign order form for [employer]ogen portable oxygen concentrator.   Spoke with patient and Daughter in law, Luli. Patient has COPD will not leave his home due to dyspnea and does not have portable O2. Has concentrator in his home that he uses 24/7. Has not left home in more than one month. Patient states knows Inogen is not likely going to be covered by insurance and willing to pay the nearly $3000 out of pocket.   Discussed with patient and Luli that Dr. Johnson has not seen him since 3/2018 and normal procedure for determining need for portable oxygen would be coming into clinic for visit and 3 step oximetry testing (at rest room air, with activity on RA and with activity on O2). This procedure with desats <89% are required for medicare to cover portable O2 through [employer] or other O2 retailer. States he does have oxygen tanks in his home that he got when discharged from hospital 3 years ago but has never used them. Patient not interested in this and just wants to purchase Inogen asap so can get out of home again.   Plan: Dr. Johnson signed order and but does recommend RTC for visit when gets Inogen and can get out of home again. Patient agrees to make appt.  Sharri Browning RN

## 2018-12-31 DIAGNOSIS — J41.8 MIXED SIMPLE AND MUCOPURULENT CHRONIC BRONCHITIS (H): ICD-10-CM

## 2018-12-31 RX ORDER — ALBUTEROL SULFATE 90 UG/1
AEROSOL, METERED RESPIRATORY (INHALATION)
Qty: 3 INHALER | Refills: 1 | Status: SHIPPED | OUTPATIENT
Start: 2018-12-31 | End: 2019-03-29

## 2019-01-25 DIAGNOSIS — Z76.0 ENCOUNTER FOR MEDICATION REFILL: ICD-10-CM

## 2019-01-25 RX ORDER — COLCHICINE 0.6 MG/1
TABLET, FILM COATED ORAL
Qty: 60 TABLET | Refills: 0 | Status: SHIPPED | OUTPATIENT
Start: 2019-01-25 | End: 2019-02-22

## 2019-01-27 DIAGNOSIS — Z76.0 ENCOUNTER FOR MEDICATION REFILL: ICD-10-CM

## 2019-01-27 RX ORDER — IPRATROPIUM BROMIDE AND ALBUTEROL 20; 100 UG/1; UG/1
SPRAY, METERED RESPIRATORY (INHALATION)
Qty: 12 G | Refills: 3 | Status: SHIPPED | OUTPATIENT
Start: 2019-01-27 | End: 2019-07-07

## 2019-02-22 DIAGNOSIS — Z76.0 ENCOUNTER FOR MEDICATION REFILL: ICD-10-CM

## 2019-02-22 RX ORDER — COLCHICINE 0.6 MG/1
TABLET ORAL
Qty: 60 TABLET | Refills: 0 | Status: SHIPPED | OUTPATIENT
Start: 2019-02-22 | End: 2019-05-21

## 2019-03-15 NOTE — PROGRESS NOTES
3/4/19 Received fax from [employer], it has been 365 days and the patient has not done the test.  The order has not been cancelled.  If the patient decides to proceed with the test, she will need a new order.    Yehuda Plummer,   [facility]  [phone]

## 2019-03-29 ENCOUNTER — TELEPHONE (OUTPATIENT)
Dept: FAMILY MEDICINE | Facility: CLINIC | Age: 74
End: 2019-03-29

## 2019-03-29 DIAGNOSIS — J44.9 COPD (CHRONIC OBSTRUCTIVE PULMONARY DISEASE) (H): Primary | ICD-10-CM

## 2019-03-29 RX ORDER — ALBUTEROL SULFATE 90 UG/1
2 AEROSOL, METERED RESPIRATORY (INHALATION) EVERY 6 HOURS
Qty: 18 G | Refills: 1 | Status: SHIPPED | OUTPATIENT
Start: 2019-03-29 | End: 2020-02-24

## 2019-03-29 NOTE — TELEPHONE ENCOUNTER
Call from patient that he got a letter from [facility] that his Albuterol will not be covered anymore. Letter says Ventolin HFA is on formulary.  Patient has used Ventolin in the past and had good results. Per Dr Alex MYERS to change to Ventolin HFA.  Sent rx to pharmacy and notified patient of change.

## 2019-05-07 ENCOUNTER — TELEPHONE (OUTPATIENT)
Dept: FAMILY MEDICINE | Facility: CLINIC | Age: 74
End: 2019-05-07

## 2019-05-07 NOTE — TELEPHONE ENCOUNTER
Per Jon, patient needs face-to-face with MD to document continued need for oxygen. Patient has not been seen in clinic since 3/2018 -- also due for med check with updated refills and any necessary labs.     Per Jon, oximetry testing is not necessary at this visit, MD needs to document in visit note patient's continued need for oxygen.   Patient informed and apt with Dr. Johnson scheduled for 5/21/19.   Sharri Browning RN

## 2019-05-21 ENCOUNTER — OFFICE VISIT (OUTPATIENT)
Dept: FAMILY MEDICINE | Facility: CLINIC | Age: 74
End: 2019-05-21

## 2019-05-21 VITALS — HEART RATE: 82 BPM | OXYGEN SATURATION: 93 % | DIASTOLIC BLOOD PRESSURE: 72 MMHG | SYSTOLIC BLOOD PRESSURE: 126 MMHG

## 2019-05-21 DIAGNOSIS — J43.2 CENTRILOBULAR EMPHYSEMA (H): Primary | ICD-10-CM

## 2019-05-21 DIAGNOSIS — R60.0 LOCALIZED EDEMA: ICD-10-CM

## 2019-05-21 DIAGNOSIS — Z76.0 ENCOUNTER FOR MEDICATION REFILL: ICD-10-CM

## 2019-05-21 DIAGNOSIS — M1A.0790 IDIOPATHIC CHRONIC GOUT OF FOOT WITHOUT TOPHUS, UNSPECIFIED LATERALITY: ICD-10-CM

## 2019-05-21 DIAGNOSIS — M1A.09X0 CHRONIC GOUT OF MULTIPLE SITES, UNSPECIFIED CAUSE: ICD-10-CM

## 2019-05-21 DIAGNOSIS — F03.90 DEMENTIA WITHOUT BEHAVIORAL DISTURBANCE, UNSPECIFIED DEMENTIA TYPE: ICD-10-CM

## 2019-05-21 LAB
% GRANULOCYTES: 77.7 % (ref 42.2–75.2)
HCT VFR BLD AUTO: 40.8 % (ref 39–51)
HEMOGLOBIN: 13 G/DL (ref 13.4–17.5)
LYMPHOCYTES NFR BLD AUTO: 17.3 % (ref 20.5–51.1)
MCH RBC QN AUTO: 29.6 PG (ref 27–31)
MCHC RBC AUTO-ENTMCNC: 31.9 G/DL (ref 33–37)
MCV RBC AUTO: 92.8 FL (ref 80–100)
MONOCYTES NFR BLD AUTO: 5 % (ref 1.7–9.3)
PLATELET # BLD AUTO: 189 K/UL (ref 140–450)
RBC # BLD AUTO: 4.39 X10/CMM (ref 4.2–5.9)
WBC # BLD AUTO: 7 X10/CMM (ref 3.8–11)

## 2019-05-21 PROCEDURE — 99214 OFFICE O/P EST MOD 30 MIN: CPT | Performed by: FAMILY MEDICINE

## 2019-05-21 PROCEDURE — 36415 COLL VENOUS BLD VENIPUNCTURE: CPT | Performed by: FAMILY MEDICINE

## 2019-05-21 PROCEDURE — 85025 COMPLETE CBC W/AUTO DIFF WBC: CPT | Performed by: FAMILY MEDICINE

## 2019-05-21 RX ORDER — COLCHICINE 0.6 MG/1
TABLET ORAL
Qty: 60 TABLET | Refills: 11 | Status: ON HOLD | OUTPATIENT
Start: 2019-05-21 | End: 2020-04-09

## 2019-05-21 NOTE — PROGRESS NOTES
Problem(s) Oriented visit      Subjective:  CC: Consult (C/O bloating. Denies diarrhea or constipation.); Consult (Bilateral edema, feet X 3 months. Admits to sitting most of the time with feet on the floor. ); and Recheck Medication (O2 is normally at 5. Has new portable machine today which is on 3. )        1. Chronic gout of multiple sites, unspecified cause  No recent gout flares, tolerating the allopurinol well with no side effects.      2. Centrilobular emphysema (H)  COPD remains stable.  Has not had any recent breathing troubles beyond usual baseline.  Has not any acute respiratory events.  Remains with intermittent cough, mild shortness of breath with overexertion as per usual.  Using medication as directed with reported side effects.  He has been using O2 for many years, can't survive without it.  - CBC with Diff/Plt ([facility])  - CARE COORDINATION REFERRAL    3. Idiopathic chronic gout of foot without tophus, unspecified laterality    - CARE COORDINATION REFERRAL    4. Dementia without behavioral disturbance, unspecified dementia type  SUBJECTIVE:  Jah Tate is an 74 year old male who presents for evaluation and treatment   of confusion symptoms. Onset approximately 1.5 years   ago, gradually worsening since that time. Current symptoms include   forgetfulness, social isolation, loss of memory and inability to learn. Family history positive for  dementia in the   patient s mother. Previous treatment modalities   employed include none.      Risk factors: age, smoking  and alcohol use  Organic causes of dementia present: no    ROS:  General and Resp. completed and negative except as noted above     HISTORY:   reports that he does not drink alcohol.   reports that he has been smoking cigarettes.  He has a 12.50 pack-year smoking history. He has never used smokeless tobacco.    Past Medical History:   Diagnosis Date     COPD (chronic obstructive pulmonary disease) (H)      Past Surgical History:    Procedure Laterality Date     FRACTURE TX, ANKLE RT/LT      left- w/plate       EXAM:  BP: 126/72   Pulse: 82    Temp: Data Unavailable    Wt Readings from Last 2 Encounters:   03/01/18 79.7 kg (175 lb 9.6 oz)   08/14/17 74.8 kg (165 lb)       BMI= There is no height or weight on file to calculate BMI.    EXAM:  APPEARANCE: = Relaxed and in no distress  Conj/Eyelids = noninjected and lids and lashes are without inflammation  PERRLA/Irises = Pupils Round Reactive to Light and Irisis without inflammation  Neck = No anterior or posterior adenopathy appreciated.  Thyroid = Not enlarged and no masses felt  Resp effort =  Mild Distress  Breath Sounds =  distant breath sounds  Heart Rate, Rythym, & sounds (no Murm)  = Regular rate and rythym with no S3, S4, or murmer appreciated.  Carotid Art's = Pulses full and equal and no bruits appreciated  Abdomen = Soft, nontender, no masses, & bowel sounds in all quadrants  Liver/Spleen = Normal span and no splenomegaly noted  Digits and Nails = FROM in all finger joints, no nail dystrophy  Ext (edema) = No pretibial edema noted or elsewhere  Musculsktl =  Strength and ROM of major joints are within normal limits  SKIN = absent significant rashes or lesions   Recent/Remote Memory = poor, no month, no date, one president.  World not   NEURO: Normal strength and tone and speech normal  Mood/Affect = Cooperative and interested      Assessment/Plan:  Jah was seen today for consult, consult and recheck medication.    Diagnoses and all orders for this visit:    Centrilobular emphysema (H)  -     CBC with Diff/Plt ([facility])  -     CARE COORDINATION REFERRAL    Chronic gout of multiple sites, unspecified cause    Idiopathic chronic gout of foot without tophus, unspecified laterality  -     CARE COORDINATION REFERRAL    Dementia without behavioral disturbance, unspecified dementia type  -     Vitamin B12 and Folate ([facility])  -     Comp. Metabolic Panel (14) ([facility])  -     CBC with  "Diff/Plt ([facility])  -     CARE COORDINATION REFERRAL    Encounter for medication refill  -     colchicine (COLCYRS) 0.6 MG tablet; TAKE 1 TABLET BY MOUTH TWICE A DAY    Localized edema    Other orders  -     Compression stockings        COUNSELING:   reports that he has been smoking cigarettes.  He has a 12.50 pack-year smoking history. He has never used smokeless tobacco.  Tobacco Cessation Action Plan: Information offered: Patient not interested at this time  Estimated body mass index is 25.02 kg/m  as calculated from the following:    Height as of 4/16/15: 1.784 m (5' 10.25\").    Weight as of 3/1/18: 79.7 kg (175 lb 9.6 oz).       Appropriate preventive services were discussed with this patient, including applicable screening as appropriate for cardiovascular disease, diabetes, osteopenia/osteoporosis, and glaucoma.  As appropriate for age/gender, discussed screening for colorectal cancer, prostate cancer, breast cancer, and cervical cancer. Checklist reviewing preventive services available has been given to the patient.    Reviewed patients plan of care and provided an AVS. The Basic Care Plan (routine screening as documented in Health Maintenance) for Jah meets the Care Plan requirement. This Care Plan has been established and reviewed with the  Patient and spouse.      The following health maintenance items are reviewed in Epic and correct as of today:  Health Maintenance   Topic Date Due     SPIROMETRY  1945     LIPID  01/08/1978     ZOSTER IMMUNIZATION (1 of 2) 01/08/1993     MEDICARE ANNUAL WELLNESS VISIT  01/08/2008     PHQ-2  01/01/2019     FALL RISK ASSESSMENT  03/01/2019     INFLUENZA VACCINE (Season Ended) 09/01/2019     ADVANCED DIRECTIVE PLANNING  03/01/2023     DTAP/TDAP/TD IMMUNIZATION (2 - Td) 06/11/2026     COPD ACTION PLAN  Completed     PNEUMOCOCCAL IMMUNIZATION 65+ LOW/MEDIUM RISK  Completed     IPV IMMUNIZATION  Aged Out     MENINGITIS IMMUNIZATION  Aged Out       Eric Emerson " Alex  [facility]  [phone]       For any issues my office # is [phone]

## 2019-05-22 ENCOUNTER — PATIENT OUTREACH (OUTPATIENT)
Dept: CARE COORDINATION | Facility: CLINIC | Age: 74
End: 2019-05-22

## 2019-05-22 LAB
ALBUMIN SERPL-MCNC: 3.8 G/DL (ref 3.5–4.8)
ALBUMIN/GLOB SERPL: 1.3 {RATIO} (ref 1.2–2.2)
ALP SERPL-CCNC: 73 IU/L (ref 39–117)
ALT SERPL-CCNC: 6 IU/L (ref 0–44)
AST SERPL-CCNC: 12 IU/L (ref 0–40)
BILIRUB SERPL-MCNC: 0.3 MG/DL (ref 0–1.2)
BUN SERPL-MCNC: 10 MG/DL (ref 8–27)
BUN/CREATININE RATIO: 12 (ref 10–24)
CALCIUM SERPL-MCNC: 9.5 MG/DL (ref 8.6–10.2)
CHLORIDE SERPLBLD-SCNC: 98 MMOL/L (ref 96–106)
CREAT SERPL-MCNC: 0.81 MG/DL (ref 0.76–1.27)
EGFR IF AFRICN AM: 100 ML/MIN/1.73
EGFR IF NONAFRICN AM: 86 ML/MIN/1.73
FOLATE: 2.5 NG/ML
GLOBULIN, TOTAL: 2.9 G/DL (ref 1.5–4.5)
GLUCOSE SERPL-MCNC: 97 MG/DL (ref 65–99)
POTASSIUM SERPL-SCNC: 4.7 MMOL/L (ref 3.5–5.2)
PROT SERPL-MCNC: 6.7 G/DL (ref 6–8.5)
SODIUM SERPL-SCNC: 141 MMOL/L (ref 134–144)
TOTAL CO2: 30 MMOL/L (ref 20–29)
VIT B12 SERPL-MCNC: 358 PG/ML (ref 232–1245)

## 2019-05-22 NOTE — PROGRESS NOTES
"Clinic Care Coordination Contact    Referral: \"Care Transition: needs information on next steps for getting out of the house.\"    Data: Writer called patient on 5/22/19. Patient resides with his wife in a house in [place]. Patient said that he and his wife have talked about moving from their house \"eventually\". Patient mentioned that their washer and dryer are in the basement/lower level of their home.     Patient then put his wife on the phone who also stated that she and patient are thinking about moving sometime in the future as their house is getting to be too much for them to care for. Patient and wife would like to move nearer their children who live in [place], [place] and [place], [place]. Patient and wife are not sure, as yet, what type of housing they might want to move to--townhouse, double bungalow, etc. Writer briefly discussed a possible option of moving into an independent apartment within a senior housing complex (so that additional help/services are available if needed in the future).     Wife said that they (pt/wife) would have to spend time clearing out their current home before they could move. Wife was interested in writer mailing a senior housing book so that pt/wife could review housing options.    Plan: Patient and wife are considering a move from their house in the future. Writer will mail a senior housing book to patient.      Marcela Garzon [facility] Care Coordination  Clinic: [facility]   Email: [email or web address]  Tele: [phone]      Addendum  5/24/19  Data: Writer mailed a senior housing book to patient today.    Plan: Patient has writer's contact information if pt or pt's wife would like to review community resources in the future. [facility] plans no further outreach at this time.      Marcela Garzon [facility] Care Coordination  Clinic: [facility]   Email: [email or web address]  Tele: " [phone]

## 2019-05-23 DIAGNOSIS — E53.8 FOLATE DEFICIENCY: Primary | ICD-10-CM

## 2019-05-23 RX ORDER — FOLIC ACID 1 MG/1
1 TABLET ORAL DAILY
Qty: 90 TABLET | Refills: 3 | Status: SHIPPED | OUTPATIENT
Start: 2019-05-23 | End: 2019-07-16

## 2019-05-24 ENCOUNTER — TELEPHONE (OUTPATIENT)
Dept: FAMILY MEDICINE | Facility: CLINIC | Age: 74
End: 2019-05-24

## 2019-05-24 DIAGNOSIS — E53.8 FOLATE DEFICIENCY: Primary | ICD-10-CM

## 2019-05-24 RX ORDER — FOLIC ACID 1 MG/1
1 TABLET ORAL DAILY
Qty: 90 TABLET | Refills: 3 | Status: SHIPPED | OUTPATIENT
Start: 2019-05-24 | End: 2020-09-01

## 2019-05-24 NOTE — TELEPHONE ENCOUNTER
----- Message from Eric Johnson MD sent at 5/23/2019  5:41 PM CDT -----  Lets start him on folate 1 mg daily for his folate deficiency

## 2019-05-24 NOTE — TELEPHONE ENCOUNTER
Called and spoke with patient regarding low folate level. Prescription sent to pharmacy for folic acid. Advised to call clinic with questions. Batsheva Celaya

## 2019-06-05 ENCOUNTER — TELEPHONE (OUTPATIENT)
Dept: FAMILY MEDICINE | Facility: CLINIC | Age: 74
End: 2019-06-05

## 2019-06-05 DIAGNOSIS — J41.8 MIXED SIMPLE AND MUCOPURULENT CHRONIC BRONCHITIS (H): ICD-10-CM

## 2019-06-05 NOTE — TELEPHONE ENCOUNTER
6/3/19 received call from patient and fax from [facility] stating patient needs to RTC for oximetry testing due to changed insurance so now needs to change oxygen providers and has chosen [facility].   Patient has home concentrator and portable O2.   Patient saw Dr. Johnson 5/21/19 and O2 was discussed.   6/5/19 per Elly at [facility], since Dr. Johnson addressed continued need for O2 in his 5/21/19 visit note, patient does not need to RTC of oximetry testing, they will use info from his previous O2 provider. Faxed Elly Johnson's visit note. Called patient and informed no visit needed at this time.   Sharri Browning RN

## 2019-06-06 RX ORDER — ALBUTEROL SULFATE 90 UG/1
AEROSOL, METERED RESPIRATORY (INHALATION)
Qty: 18 G | Refills: 0 | Status: SHIPPED | OUTPATIENT
Start: 2019-06-06 | End: 2019-06-14

## 2019-06-14 DIAGNOSIS — J41.8 MIXED SIMPLE AND MUCOPURULENT CHRONIC BRONCHITIS (H): ICD-10-CM

## 2019-06-14 RX ORDER — ALBUTEROL SULFATE 90 UG/1
AEROSOL, METERED RESPIRATORY (INHALATION)
Qty: 18 INHALER | Refills: 0 | Status: SHIPPED | OUTPATIENT
Start: 2019-06-14 | End: 2019-07-07

## 2019-07-07 DIAGNOSIS — J44.9 COPD (CHRONIC OBSTRUCTIVE PULMONARY DISEASE) (H): Primary | ICD-10-CM

## 2019-07-07 DIAGNOSIS — Z76.0 ENCOUNTER FOR MEDICATION REFILL: ICD-10-CM

## 2019-07-07 DIAGNOSIS — J41.8 MIXED SIMPLE AND MUCOPURULENT CHRONIC BRONCHITIS (H): ICD-10-CM

## 2019-07-07 RX ORDER — ALBUTEROL SULFATE 90 UG/1
AEROSOL, METERED RESPIRATORY (INHALATION)
Qty: 18 INHALER | Refills: 0 | Status: SHIPPED | OUTPATIENT
Start: 2019-07-07 | End: 2019-07-16

## 2019-07-07 RX ORDER — IPRATROPIUM BROMIDE AND ALBUTEROL 20; 100 UG/1; UG/1
SPRAY, METERED RESPIRATORY (INHALATION)
Refills: 3 | Status: CANCELLED | OUTPATIENT
Start: 2019-07-07

## 2019-07-16 ENCOUNTER — OFFICE VISIT (OUTPATIENT)
Dept: FAMILY MEDICINE | Facility: CLINIC | Age: 74
End: 2019-07-16

## 2019-07-16 VITALS
RESPIRATION RATE: 16 BRPM | OXYGEN SATURATION: 92 % | SYSTOLIC BLOOD PRESSURE: 126 MMHG | DIASTOLIC BLOOD PRESSURE: 72 MMHG | HEART RATE: 88 BPM

## 2019-07-16 DIAGNOSIS — F02.80 LATE ONSET ALZHEIMER'S DISEASE WITHOUT BEHAVIORAL DISTURBANCE (H): ICD-10-CM

## 2019-07-16 DIAGNOSIS — J41.8 MIXED SIMPLE AND MUCOPURULENT CHRONIC BRONCHITIS (H): ICD-10-CM

## 2019-07-16 DIAGNOSIS — G30.1 LATE ONSET ALZHEIMER'S DISEASE WITHOUT BEHAVIORAL DISTURBANCE (H): ICD-10-CM

## 2019-07-16 DIAGNOSIS — J44.9 COPD (CHRONIC OBSTRUCTIVE PULMONARY DISEASE) (H): Primary | ICD-10-CM

## 2019-07-16 PROCEDURE — 99214 OFFICE O/P EST MOD 30 MIN: CPT | Performed by: FAMILY MEDICINE

## 2019-07-16 RX ORDER — ALBUTEROL SULFATE 90 UG/1
AEROSOL, METERED RESPIRATORY (INHALATION)
Qty: 18 G | Refills: 11 | Status: SHIPPED | OUTPATIENT
Start: 2019-07-16 | End: 2020-04-06

## 2019-07-16 RX ORDER — IPRATROPIUM BROMIDE AND ALBUTEROL SULFATE 2.5; .5 MG/3ML; MG/3ML
3 SOLUTION RESPIRATORY (INHALATION) 4 TIMES DAILY
COMMUNITY
Start: 2019-06-22 | End: 2020-03-09 | Stop reason: ALTCHOICE

## 2019-07-16 RX ORDER — AZITHROMYCIN 500 MG/1
1 TABLET, FILM COATED ORAL DAILY
Refills: 0 | COMMUNITY
Start: 2019-06-22 | End: 2020-03-09

## 2019-07-16 RX ORDER — PREDNISONE 20 MG/1
2 TABLET ORAL DAILY
Refills: 0 | COMMUNITY
Start: 2019-06-22 | End: 2020-03-09

## 2019-07-16 RX ORDER — CEFUROXIME AXETIL 500 MG/1
1 TABLET ORAL 2 TIMES DAILY
Refills: 0 | COMMUNITY
Start: 2019-06-22 | End: 2020-03-09

## 2019-07-16 NOTE — PROGRESS NOTES
Problem(s) Oriented visit        SUBJECTIVE:                                                    Jah Tate is a 74 year old male who presents to clinic today for the following health issues :            1. Mixed simple and mucopurulent chronic bronchitis (H)  Reviewed hospital notes from [facility]  Finished the AB and prednsison  - albuterol (PROAIR HFA/PROVENTIL HFA/VENTOLIN HFA) 108 (90 Base) MCG/ACT inhaler; INHALE 2 PUFFS BY MOUTH EVERY 6 HOURS AS NEEDED FOR SHORTNESS OF BREATH OR WHEEZING  Dispense: 18 g; Refill: 11         Problem list, Medication list, Allergies, and Medical/Social/Surgical histories reviewed in [facility] and updated as appropriate.   Additional history: as documented    ROS:  General and Resp. completed and negative except as noted above    Histories:   Patient Active Problem List   Diagnosis     COPD (chronic obstructive pulmonary disease) (H)     Health Care Home     Influenza B     Chronic gout of multiple sites     Late onset Alzheimer's disease without behavioral disturbance     Past Surgical History:   Procedure Laterality Date     FRACTURE TX, ANKLE RT/LT      left- w/plate       Social History     Tobacco Use     Smoking status: Current Every Day Smoker     Packs/day: 0.25     Years: 50.00     Pack years: 12.50     Types: Cigarettes     Smokeless tobacco: Never Used     Tobacco comment: cutting down   Substance Use Topics     Alcohol use: No     Comment: 4-5 drinks per month     No family history on file.        OBJECTIVE:                                                    /72   Pulse 88   Resp 16   SpO2 92%   There is no height or weight on file to calculate BMI.   APPEARANCE: = alert and mild distress  Conj/Eyelids = noninjected and lids and lashes are without inflammation  PERRLA/Irises = Pupils Round Reactive to Light and Irisis without inflammation  Ears/Nose = External structures and Nares have usual shape and form  Ear canals and TM's = Canals are not inflammed and  have none or little wax and the drums are not injected and have a light reflex   Lips/Teeth/Gums = No lesions seen, good dentition, and gums seem healthy  Oropharynx = No leukoplakia, No injection to the tissues, Normal Uvula  Neck = No anterior or posterior adenopathy appreciated.  Resp effort =  Mild Distress  Breath Sounds =  distant breath sounds  Mood/Affect = Cooperative and interested     ASSESSMENT/PLAN:                                                        Jah was seen today for hospital f/u.    Diagnoses and all orders for this visit:    Mixed simple and mucopurulent chronic bronchitis (H)  -     albuterol (PROAIR HFA/PROVENTIL HFA/VENTOLIN HFA) 108 (90 Base) MCG/ACT inhaler; INHALE 2 PUFFS BY MOUTH EVERY 6 HOURS AS NEEDED FOR SHORTNESS OF BREATH OR WHEEZING  -     CARE COORDINATION REFERRAL    Late onset Alzheimer's disease without behavioral disturbance    >25 min spent with patient, greater than 50% spent on discussion/education/planning, etc. About Diagnoses of Mixed simple and mucopurulent chronic bronchitis (H) and Late onset Alzheimer's disease without behavioral disturbance were pertinent to this visit.        Regular exercise  Here with daughters       The following health maintenance items are reviewed in Epic and correct as of today:  Health Maintenance   Topic Date Due     ZOSTER IMMUNIZATION (1 of 2) 01/08/1993     MEDICARE ANNUAL WELLNESS VISIT  01/08/2008     LIPID  07/17/2017     PHQ-2  01/01/2019     FALL RISK ASSESSMENT  03/01/2019     INFLUENZA VACCINE (1) 09/01/2019     ADVANCE CARE PLANNING  03/01/2023     DTAP/TDAP/TD IMMUNIZATION (2 - Td) 06/11/2026     SPIROMETRY  Completed     COPD ACTION PLAN  Completed     PNEUMOCOCCAL IMMUNIZATION 65+ LOW/MEDIUM RISK  Completed     IPV IMMUNIZATION  Aged Out     MENINGITIS IMMUNIZATION  Aged Out       Eric Johnson MD  [facility]  Family Practice  [facility]  [phone]    For any issues my office # is  [phone]

## 2019-07-17 ENCOUNTER — PATIENT OUTREACH (OUTPATIENT)
Dept: CARE COORDINATION | Facility: CLINIC | Age: 74
End: 2019-07-17

## 2019-07-18 NOTE — PROGRESS NOTES
Clinic Care Coordination Contact    Referral: Resources for Support. Additional pertinent details:  Needs help with bathing    Data: Orders have been written for home RN, P.T. and Home Health Aide services for patient. Per chart review (HomeCare Advisor), patient's [facility] RN Case Manager is Bobby Gore ([phone]). It appears that the first home care visit is anticipated to be tomorrow, 7/19/19.    Writer called patient today. Patient said that his (pt's) wife is not feeling well today--flu?\    Writer discussed home care services. Patient said that he (pt) is in agreement with home care visits as long as they are covered under his health insurance. Patient said that he (pt) has not received a call from [facility] as yet--and does not believe that his wife has received a phone call.    Writer has left a message in HomeCare Advisor requesting that home care staff contact writer regarding an on-going needs once patient is ready for discharge from home care services.    Plan: Patient is to receive home care services. [facility] will follow for possible need for other community resources.       Marcela Garzon [facility] Care Coordination  Clinics: [facility]   Email: [email or web address]  Tele: [phone]      Addendum  7/18/19  2:10pm  Data: Patient called writer and left a voice message. Patient said that his (patient's) daughter had called him to let him know that patient will be getting a home care visit tomorrow at 11:00 am.    Marcela Garzon [facility] Care Coordination  Clinics: [facility]   Email: [email or web address]  Tele: [phone]

## 2019-07-19 PROCEDURE — G0180 MD CERTIFICATION HHA PATIENT: HCPCS | Performed by: FAMILY MEDICINE

## 2019-07-25 ENCOUNTER — MEDICAL CORRESPONDENCE (OUTPATIENT)
Dept: FAMILY MEDICINE | Facility: CLINIC | Age: 74
End: 2019-07-25

## 2019-07-27 ENCOUNTER — MEDICAL CORRESPONDENCE (OUTPATIENT)
Dept: FAMILY MEDICINE | Facility: CLINIC | Age: 74
End: 2019-07-27

## 2019-07-30 ENCOUNTER — MEDICAL CORRESPONDENCE (OUTPATIENT)
Dept: FAMILY MEDICINE | Facility: CLINIC | Age: 74
End: 2019-07-30

## 2019-08-20 ENCOUNTER — PATIENT OUTREACH (OUTPATIENT)
Dept: FAMILY MEDICINE | Facility: CLINIC | Age: 74
End: 2019-08-20

## 2019-08-20 NOTE — PROGRESS NOTES
Clinic Care Coordination Contact  Chart Review    Situation: Patient chart reviewed by care coordinator.    Background/Assessment: Per chart review (HomeCare Advisor), patient's [facility] services were discontinued on 8/15/19. [facility] did not notify writer of any on-going concerns regarding patient.     Patient's most recent [facility] office visit was on 7/17/19. Per chart review, patient has not been hospitalized or in the Emergency Room since that time.    Plan/Recommendations: [facility] did not relay any concerns about patient prior to patient's discharge from home care services. Writer has talked with patient and patient's wife in the past so they (pt & wife) are aware of the availability of Care Coordination.    [facility] plans no further outreach at this time but remains available if patient/wife wish to review/discuss community resources in the future.    NIURKA Mack  [facility] Care Coordination  Clinics: [facility]   Email: [email or web address]  Tele: [phone]

## 2019-08-21 ENCOUNTER — MEDICAL CORRESPONDENCE (OUTPATIENT)
Dept: FAMILY MEDICINE | Facility: CLINIC | Age: 74
End: 2019-08-21

## 2019-12-17 ENCOUNTER — TELEPHONE (OUTPATIENT)
Dept: FAMILY MEDICINE | Facility: CLINIC | Age: 74
End: 2019-12-17

## 2019-12-17 NOTE — TELEPHONE ENCOUNTER
Received fax from Pain resource management, they are asking provider to sign paperwork for back brace.  Spoke with patient, he did not call them.  They have been calling him off/on for over a year.  He tells them no, but they are not listening.  Faxed back to facility (denied, no not call patient any more)    Yehuda Plummer,   [facility]  [phone]

## 2020-02-24 ENCOUNTER — TELEPHONE (OUTPATIENT)
Dept: FAMILY MEDICINE | Facility: CLINIC | Age: 75
End: 2020-02-24

## 2020-02-24 NOTE — TELEPHONE ENCOUNTER
Pharmacist left message today voicing ongoing concern regarding patient's apparent misuse of his inhalers based on fill patterns at pharmacy. Pharmacist reports in past 3 months patient has filled 6 albuterol inhalers and 8 Stiolto inhalers. Insurance is not covering the cost of these so patient has paid out of pocket $1200 in past 3 months.   Patient filled Stiolto #3 inhalers on 2/1/20 and is calling them today saying he is out and needs more asap. Pharmacist hesitant to continue filling at this rate.   RN called patient with these concerns. Patient agrees it has been a problem for him. Stating his wife tells him he uses it too often and needs to stop this. Says he forgets if he took them so uses again. Asked if his wife is willing to manage these meds for him and give him the inhaler to use at the appropriate times. Patient says yes, she will do this. Asked if I should talk to wife--he says no need. Informed him of amount of money he has spent out of pocket - he agrees and laughs saying he knows and not to say it too loud. Advised patient we will order one Stiolto inhaler at a time for him and it needs to last one month. Patient agrees. Called pharmacist and informed of plan. Pharmacy has valid rx from 2/1/20 for Stiolto #3 inhalers with 3 refills and will change this to dispense one per month. Same with albuterol.  Pharmacy will call us if patient continues to call requesting early fills.   Sharri Browning RN

## 2020-03-05 NOTE — TELEPHONE ENCOUNTER
"Called and spoke with patient regarding inhaler use. He reports that he understands how to use them correctly and will use as prescribed. He reports that he has quit smoking x3 days and feels smoking was part of the overuse issue. He is offered appointment with Radha Hinds to discuss COPD treatment options. He declines at this time and wishes to, \"see how the next week goes\". He reports that his wife is monitoring he usage and he has been taking inhalers as prescribed. This nurse requested that patient call clinic in 1 week with update. Patient agrees. Batsheva Celaya   "

## 2020-03-09 ENCOUNTER — OFFICE VISIT (OUTPATIENT)
Dept: PHARMACY | Facility: PHYSICIAN GROUP | Age: 75
End: 2020-03-09
Payer: COMMERCIAL

## 2020-03-09 DIAGNOSIS — Z99.81 O2 DEPENDENT: ICD-10-CM

## 2020-03-09 DIAGNOSIS — J44.9 COPD (CHRONIC OBSTRUCTIVE PULMONARY DISEASE) (H): Primary | ICD-10-CM

## 2020-03-09 DIAGNOSIS — J42 CHRONIC BRONCHITIS, UNSPECIFIED CHRONIC BRONCHITIS TYPE (H): ICD-10-CM

## 2020-03-09 PROCEDURE — 99207 ZZC NO CHARGE LOS: CPT | Performed by: PHARMACIST

## 2020-03-09 RX ORDER — CHOLECALCIFEROL (VITAMIN D3) 50 MCG
1 TABLET ORAL DAILY
Status: ON HOLD | COMMUNITY
End: 2021-02-05

## 2020-03-09 RX ORDER — IPRATROPIUM BROMIDE AND ALBUTEROL SULFATE 2.5; .5 MG/3ML; MG/3ML
1 SOLUTION RESPIRATORY (INHALATION) EVERY 6 HOURS
Qty: 360 ML | Refills: 2 | Status: ON HOLD | OUTPATIENT
Start: 2020-03-09 | End: 2020-04-09

## 2020-03-09 RX ORDER — BUDESONIDE 0.5 MG/2ML
0.5 INHALANT ORAL 2 TIMES DAILY
Qty: 120 ML | Refills: 2 | Status: SHIPPED | OUTPATIENT
Start: 2020-03-09 | End: 2020-03-31

## 2020-03-09 NOTE — PROGRESS NOTES
03/09/20 1:47 PM Nebulizer supply order faxed to [facility] -- requesting asap delivery.  Sharri Browning RN

## 2020-03-09 NOTE — PROGRESS NOTES
Clinical Pharmacy Consult:                                                    Jah Tate is a 75 year old male seen for a clinical pharmacist consult.  He was referred to me from triage.     Reason for Consult: COPD issues with inhalers.   Discussion:   Over using Stiolto inhaler (see chart notes for details). He is also using frequent albuterol HFA as well.   He has not been using neb machine- states he feels worse if he does use it, but then determined he was not wearing oxygen while using his nebulizer.     Has never replaced cups or tubing for his nebulizer that he got last summer.     On continuous oxygen via nasal canula at 3L in office today, but states it is up to 4-5L at home (they aren't certain about the actual setting though). Using [employer] for supplies.     Recommend changing to mask for nebulization of medication and adding inhaled steroid. Should schedule RHYS/ANDREW therapy and should not stop oxygen while using it.     Could still have albuterol HFA for rescue if needed, but should limit to 4 puff per day or less. He is not seeing pulmonology and would benefit from this as he should also be evaluated for untreated sleep apnea.     Continues to smoke- aware of risks and use with oxygen. Not willing to quit.     Plan:  1. Mask for nebulizer- to use with oxygen. Will send prescription for Duonebs to be used 4 times daily and budesonide 2 times daily.      2. Ventolin PRN for rescue.     3. Stopping Stiolto for now. In the future if he is traveling and cannot use his nebulizer could consider order for stiolto, but he has been over using and would be best to avoid confusion with the nebs at this time.     4. Smoking cessation encouraged.         Radha Bejarano, Pharm.D, [facility]  Medication Therapy Management Pharmacist  [phone]

## 2020-03-26 NOTE — PROGRESS NOTES
Per Radha PharmD -I called Jon to find out when patients mask will be sent to him.  They say it should go out in a day or so. Called and informed patient . Also recommended by Radha that patient sees pulmonary dr.  Patient says at this time he is fine and declines a visit with them.

## 2020-03-27 ENCOUNTER — MEDICAL CORRESPONDENCE (OUTPATIENT)
Dept: FAMILY MEDICINE | Facility: CLINIC | Age: 75
End: 2020-03-27

## 2020-04-05 DIAGNOSIS — J41.8 MIXED SIMPLE AND MUCOPURULENT CHRONIC BRONCHITIS (H): ICD-10-CM

## 2020-04-06 ENCOUNTER — APPOINTMENT (OUTPATIENT)
Dept: CT IMAGING | Facility: CLINIC | Age: 75
DRG: 871 | End: 2020-04-06
Attending: EMERGENCY MEDICINE
Payer: COMMERCIAL

## 2020-04-06 ENCOUNTER — HOSPITAL ENCOUNTER (INPATIENT)
Facility: CLINIC | Age: 75
LOS: 3 days | Discharge: HOME HEALTH | DRG: 871 | End: 2020-04-09
Attending: EMERGENCY MEDICINE | Admitting: INTERNAL MEDICINE
Payer: COMMERCIAL

## 2020-04-06 ENCOUNTER — APPOINTMENT (OUTPATIENT)
Dept: GENERAL RADIOLOGY | Facility: CLINIC | Age: 75
DRG: 871 | End: 2020-04-06
Attending: EMERGENCY MEDICINE
Payer: COMMERCIAL

## 2020-04-06 DIAGNOSIS — J44.9 CHRONIC OBSTRUCTIVE PULMONARY DISEASE, UNSPECIFIED COPD TYPE (H): ICD-10-CM

## 2020-04-06 DIAGNOSIS — Z76.0 ENCOUNTER FOR MEDICATION REFILL: ICD-10-CM

## 2020-04-06 DIAGNOSIS — J44.9 COPD (CHRONIC OBSTRUCTIVE PULMONARY DISEASE) (H): ICD-10-CM

## 2020-04-06 DIAGNOSIS — J41.8 MIXED SIMPLE AND MUCOPURULENT CHRONIC BRONCHITIS (H): ICD-10-CM

## 2020-04-06 DIAGNOSIS — J18.9 COMMUNITY ACQUIRED PNEUMONIA OF LEFT LOWER LOBE OF LUNG: ICD-10-CM

## 2020-04-06 DIAGNOSIS — J42 CHRONIC BRONCHITIS, UNSPECIFIED CHRONIC BRONCHITIS TYPE (H): ICD-10-CM

## 2020-04-06 DIAGNOSIS — J44.1 COPD EXACERBATION (H): Primary | ICD-10-CM

## 2020-04-06 DIAGNOSIS — I48.91 ATRIAL FIBRILLATION, UNSPECIFIED TYPE (H): ICD-10-CM

## 2020-04-06 LAB
ALBUMIN SERPL-MCNC: 3.3 G/DL (ref 3.4–5)
ALBUMIN UR-MCNC: NEGATIVE MG/DL
ALP SERPL-CCNC: 91 U/L (ref 40–150)
ALT SERPL W P-5'-P-CCNC: 14 U/L (ref 0–70)
ANION GAP SERPL CALCULATED.3IONS-SCNC: 6 MMOL/L (ref 3–14)
APPEARANCE UR: CLEAR
AST SERPL W P-5'-P-CCNC: 15 U/L (ref 0–45)
BASE EXCESS BLDV CALC-SCNC: 2.9 MMOL/L
BASOPHILS # BLD AUTO: 0 10E9/L (ref 0–0.2)
BASOPHILS NFR BLD AUTO: 0.3 %
BILIRUB SERPL-MCNC: 0.2 MG/DL (ref 0.2–1.3)
BILIRUB UR QL STRIP: NEGATIVE
BUN SERPL-MCNC: 15 MG/DL (ref 7–30)
CALCIUM SERPL-MCNC: 8.6 MG/DL (ref 8.5–10.1)
CHLORIDE SERPL-SCNC: 105 MMOL/L (ref 94–109)
CO2 SERPL-SCNC: 28 MMOL/L (ref 20–32)
COLOR UR AUTO: NORMAL
CREAT SERPL-MCNC: 0.78 MG/DL (ref 0.66–1.25)
D DIMER PPP FEU-MCNC: 1.4 UG/ML FEU (ref 0–0.5)
DIFFERENTIAL METHOD BLD: ABNORMAL
EOSINOPHIL # BLD AUTO: 0.7 10E9/L (ref 0–0.7)
EOSINOPHIL NFR BLD AUTO: 5.8 %
ERYTHROCYTE [DISTWIDTH] IN BLOOD BY AUTOMATED COUNT: 15.4 % (ref 10–15)
GFR SERPL CREATININE-BSD FRML MDRD: 87 ML/MIN/{1.73_M2}
GLUCOSE SERPL-MCNC: 107 MG/DL (ref 70–99)
GLUCOSE UR STRIP-MCNC: NEGATIVE MG/DL
HCO3 BLDV-SCNC: 31 MMOL/L (ref 21–28)
HCT VFR BLD AUTO: 41.3 % (ref 40–53)
HGB BLD-MCNC: 13 G/DL (ref 13.3–17.7)
HGB UR QL STRIP: NEGATIVE
IMM GRANULOCYTES # BLD: 0.3 10E9/L (ref 0–0.4)
IMM GRANULOCYTES NFR BLD: 2.8 %
INTERPRETATION ECG - MUSE: NORMAL
KETONES UR STRIP-MCNC: NEGATIVE MG/DL
LACTATE BLD-SCNC: 2.2 MMOL/L (ref 0.7–2)
LACTATE BLD-SCNC: 2.5 MMOL/L (ref 0.7–2)
LEUKOCYTE ESTERASE UR QL STRIP: NEGATIVE
LYMPHOCYTES # BLD AUTO: 1.8 10E9/L (ref 0.8–5.3)
LYMPHOCYTES NFR BLD AUTO: 15 %
MCH RBC QN AUTO: 29.1 PG (ref 26.5–33)
MCHC RBC AUTO-ENTMCNC: 31.5 G/DL (ref 31.5–36.5)
MCV RBC AUTO: 93 FL (ref 78–100)
MONOCYTES # BLD AUTO: 1.5 10E9/L (ref 0–1.3)
MONOCYTES NFR BLD AUTO: 12.9 %
NEUTROPHILS # BLD AUTO: 7.5 10E9/L (ref 1.6–8.3)
NEUTROPHILS NFR BLD AUTO: 63.2 %
NITRATE UR QL: NEGATIVE
NRBC # BLD AUTO: 0 10*3/UL
NRBC BLD AUTO-RTO: 0 /100
NT-PROBNP SERPL-MCNC: 479 PG/ML (ref 0–1800)
O2/TOTAL GAS SETTING VFR VENT: 5 %
PCO2 BLDV: 62 MM HG (ref 40–50)
PH BLDV: 7.31 PH (ref 7.32–7.43)
PH UR STRIP: 5 PH (ref 5–7)
PLATELET # BLD AUTO: 93 10E9/L (ref 150–450)
PO2 BLDV: 21 MM HG (ref 25–47)
POTASSIUM SERPL-SCNC: 4 MMOL/L (ref 3.4–5.3)
PROCALCITONIN SERPL-MCNC: 0.05 NG/ML
PROT SERPL-MCNC: 7.1 G/DL (ref 6.8–8.8)
RBC # BLD AUTO: 4.46 10E12/L (ref 4.4–5.9)
SODIUM SERPL-SCNC: 139 MMOL/L (ref 133–144)
SOURCE: NORMAL
SP GR UR STRIP: 1.01 (ref 1–1.03)
TROPONIN I SERPL-MCNC: 0.02 UG/L (ref 0–0.04)
UROBILINOGEN UR STRIP-MCNC: NORMAL MG/DL (ref 0–2)
WBC # BLD AUTO: 11.9 10E9/L (ref 4–11)

## 2020-04-06 PROCEDURE — 71275 CT ANGIOGRAPHY CHEST: CPT

## 2020-04-06 PROCEDURE — 85379 FIBRIN DEGRADATION QUANT: CPT | Performed by: EMERGENCY MEDICINE

## 2020-04-06 PROCEDURE — 83880 ASSAY OF NATRIURETIC PEPTIDE: CPT | Performed by: EMERGENCY MEDICINE

## 2020-04-06 PROCEDURE — 85025 COMPLETE CBC W/AUTO DIFF WBC: CPT | Performed by: EMERGENCY MEDICINE

## 2020-04-06 PROCEDURE — 25000125 ZZHC RX 250: Performed by: EMERGENCY MEDICINE

## 2020-04-06 PROCEDURE — 12000000 ZZH R&B MED SURG/OB

## 2020-04-06 PROCEDURE — 80053 COMPREHEN METABOLIC PANEL: CPT | Performed by: EMERGENCY MEDICINE

## 2020-04-06 PROCEDURE — 71045 X-RAY EXAM CHEST 1 VIEW: CPT

## 2020-04-06 PROCEDURE — 84484 ASSAY OF TROPONIN QUANT: CPT | Performed by: EMERGENCY MEDICINE

## 2020-04-06 PROCEDURE — 96361 HYDRATE IV INFUSION ADD-ON: CPT

## 2020-04-06 PROCEDURE — 87635 SARS-COV-2 COVID-19 AMP PRB: CPT | Performed by: EMERGENCY MEDICINE

## 2020-04-06 PROCEDURE — 25000132 ZZH RX MED GY IP 250 OP 250 PS 637: Performed by: INTERNAL MEDICINE

## 2020-04-06 PROCEDURE — 99292 CRITICAL CARE ADDL 30 MIN: CPT

## 2020-04-06 PROCEDURE — 82803 BLOOD GASES ANY COMBINATION: CPT | Performed by: EMERGENCY MEDICINE

## 2020-04-06 PROCEDURE — 81003 URINALYSIS AUTO W/O SCOPE: CPT | Performed by: EMERGENCY MEDICINE

## 2020-04-06 PROCEDURE — 25000128 H RX IP 250 OP 636: Performed by: EMERGENCY MEDICINE

## 2020-04-06 PROCEDURE — 99291 CRITICAL CARE FIRST HOUR: CPT

## 2020-04-06 PROCEDURE — 96365 THER/PROPH/DIAG IV INF INIT: CPT | Mod: 59

## 2020-04-06 PROCEDURE — 99223 1ST HOSP IP/OBS HIGH 75: CPT | Mod: AI | Performed by: INTERNAL MEDICINE

## 2020-04-06 PROCEDURE — 83605 ASSAY OF LACTIC ACID: CPT | Performed by: EMERGENCY MEDICINE

## 2020-04-06 PROCEDURE — 25800030 ZZH RX IP 258 OP 636: Performed by: EMERGENCY MEDICINE

## 2020-04-06 PROCEDURE — 87040 BLOOD CULTURE FOR BACTERIA: CPT | Performed by: EMERGENCY MEDICINE

## 2020-04-06 PROCEDURE — 96367 TX/PROPH/DG ADDL SEQ IV INF: CPT

## 2020-04-06 PROCEDURE — 25800030 ZZH RX IP 258 OP 636: Performed by: INTERNAL MEDICINE

## 2020-04-06 PROCEDURE — 84145 PROCALCITONIN (PCT): CPT | Performed by: EMERGENCY MEDICINE

## 2020-04-06 PROCEDURE — 93005 ELECTROCARDIOGRAM TRACING: CPT

## 2020-04-06 RX ORDER — LEVOFLOXACIN 5 MG/ML
750 INJECTION, SOLUTION INTRAVENOUS EVERY 24 HOURS
Status: DISCONTINUED | OUTPATIENT
Start: 2020-04-06 | End: 2020-04-06

## 2020-04-06 RX ORDER — ACETAMINOPHEN 325 MG/1
650 TABLET ORAL EVERY 4 HOURS PRN
Status: DISCONTINUED | OUTPATIENT
Start: 2020-04-06 | End: 2020-04-09 | Stop reason: HOSPADM

## 2020-04-06 RX ORDER — MAGNESIUM SULFATE HEPTAHYDRATE 40 MG/ML
2 INJECTION, SOLUTION INTRAVENOUS ONCE
Status: COMPLETED | OUTPATIENT
Start: 2020-04-06 | End: 2020-04-06

## 2020-04-06 RX ORDER — NALOXONE HYDROCHLORIDE 0.4 MG/ML
.1-.4 INJECTION, SOLUTION INTRAMUSCULAR; INTRAVENOUS; SUBCUTANEOUS
Status: DISCONTINUED | OUTPATIENT
Start: 2020-04-06 | End: 2020-04-09 | Stop reason: HOSPADM

## 2020-04-06 RX ORDER — ONDANSETRON 2 MG/ML
4 INJECTION INTRAMUSCULAR; INTRAVENOUS EVERY 6 HOURS PRN
Status: DISCONTINUED | OUTPATIENT
Start: 2020-04-06 | End: 2020-04-09 | Stop reason: HOSPADM

## 2020-04-06 RX ORDER — LIDOCAINE 40 MG/G
CREAM TOPICAL
Status: DISCONTINUED | OUTPATIENT
Start: 2020-04-06 | End: 2020-04-09 | Stop reason: HOSPADM

## 2020-04-06 RX ORDER — ASPIRIN 81 MG/1
81 TABLET ORAL DAILY
Status: DISCONTINUED | OUTPATIENT
Start: 2020-04-07 | End: 2020-04-09 | Stop reason: HOSPADM

## 2020-04-06 RX ORDER — AMOXICILLIN 250 MG
2 CAPSULE ORAL 2 TIMES DAILY PRN
Status: DISCONTINUED | OUTPATIENT
Start: 2020-04-06 | End: 2020-04-09 | Stop reason: HOSPADM

## 2020-04-06 RX ORDER — BISACODYL 10 MG
10 SUPPOSITORY, RECTAL RECTAL DAILY PRN
Status: DISCONTINUED | OUTPATIENT
Start: 2020-04-06 | End: 2020-04-09 | Stop reason: HOSPADM

## 2020-04-06 RX ORDER — IOPAMIDOL 755 MG/ML
66 INJECTION, SOLUTION INTRAVASCULAR ONCE
Status: COMPLETED | OUTPATIENT
Start: 2020-04-06 | End: 2020-04-06

## 2020-04-06 RX ORDER — AMOXICILLIN 250 MG
1 CAPSULE ORAL 2 TIMES DAILY PRN
Status: DISCONTINUED | OUTPATIENT
Start: 2020-04-06 | End: 2020-04-09 | Stop reason: HOSPADM

## 2020-04-06 RX ORDER — POTASSIUM CHLORIDE 7.45 MG/ML
10 INJECTION INTRAVENOUS
Status: DISCONTINUED | OUTPATIENT
Start: 2020-04-06 | End: 2020-04-08

## 2020-04-06 RX ORDER — ALBUTEROL SULFATE 90 UG/1
AEROSOL, METERED RESPIRATORY (INHALATION)
Qty: 18 INHALER | Refills: 0 | Status: ON HOLD | OUTPATIENT
Start: 2020-04-06 | End: 2020-04-09

## 2020-04-06 RX ORDER — ACETAMINOPHEN 650 MG/1
650 SUPPOSITORY RECTAL EVERY 4 HOURS PRN
Status: DISCONTINUED | OUTPATIENT
Start: 2020-04-06 | End: 2020-04-09 | Stop reason: HOSPADM

## 2020-04-06 RX ORDER — GUAIFENESIN 600 MG/1
600 TABLET, EXTENDED RELEASE ORAL 2 TIMES DAILY
Status: DISCONTINUED | OUTPATIENT
Start: 2020-04-06 | End: 2020-04-08

## 2020-04-06 RX ORDER — ONDANSETRON 4 MG/1
4 TABLET, ORALLY DISINTEGRATING ORAL EVERY 6 HOURS PRN
Status: DISCONTINUED | OUTPATIENT
Start: 2020-04-06 | End: 2020-04-09 | Stop reason: HOSPADM

## 2020-04-06 RX ORDER — LEVOFLOXACIN 5 MG/ML
750 INJECTION, SOLUTION INTRAVENOUS ONCE
Status: COMPLETED | OUTPATIENT
Start: 2020-04-06 | End: 2020-04-06

## 2020-04-06 RX ORDER — ALBUTEROL SULFATE 90 UG/1
1-2 AEROSOL, METERED RESPIRATORY (INHALATION) EVERY 4 HOURS PRN
Status: DISCONTINUED | OUTPATIENT
Start: 2020-04-06 | End: 2020-04-09 | Stop reason: HOSPADM

## 2020-04-06 RX ORDER — LEVOFLOXACIN 750 MG/1
750 TABLET, FILM COATED ORAL DAILY
Status: DISCONTINUED | OUTPATIENT
Start: 2020-04-07 | End: 2020-04-09 | Stop reason: HOSPADM

## 2020-04-06 RX ORDER — POTASSIUM CHLORIDE 1500 MG/1
20-40 TABLET, EXTENDED RELEASE ORAL
Status: DISCONTINUED | OUTPATIENT
Start: 2020-04-06 | End: 2020-04-08

## 2020-04-06 RX ORDER — COLCHICINE 0.6 MG/1
0.6 TABLET ORAL 2 TIMES DAILY
Status: DISCONTINUED | OUTPATIENT
Start: 2020-04-06 | End: 2020-04-08

## 2020-04-06 RX ORDER — POTASSIUM CHLORIDE 29.8 MG/ML
20 INJECTION INTRAVENOUS
Status: DISCONTINUED | OUTPATIENT
Start: 2020-04-06 | End: 2020-04-08

## 2020-04-06 RX ORDER — POTASSIUM CHLORIDE 1.5 G/1.58G
20-40 POWDER, FOR SOLUTION ORAL
Status: DISCONTINUED | OUTPATIENT
Start: 2020-04-06 | End: 2020-04-08

## 2020-04-06 RX ORDER — POTASSIUM CL/LIDO/0.9 % NACL 10MEQ/0.1L
10 INTRAVENOUS SOLUTION, PIGGYBACK (ML) INTRAVENOUS
Status: DISCONTINUED | OUTPATIENT
Start: 2020-04-06 | End: 2020-04-08

## 2020-04-06 RX ORDER — SODIUM CHLORIDE 9 MG/ML
INJECTION, SOLUTION INTRAVENOUS CONTINUOUS
Status: ACTIVE | OUTPATIENT
Start: 2020-04-06 | End: 2020-04-07

## 2020-04-06 RX ADMIN — MAGNESIUM SULFATE HEPTAHYDRATE 2 G: 40 INJECTION, SOLUTION INTRAVENOUS at 16:18

## 2020-04-06 RX ADMIN — IOPAMIDOL 66 ML: 755 INJECTION, SOLUTION INTRAVENOUS at 17:05

## 2020-04-06 RX ADMIN — LEVOFLOXACIN 750 MG: 5 INJECTION, SOLUTION INTRAVENOUS at 17:54

## 2020-04-06 RX ADMIN — COLCHICINE 0.6 MG: 0.6 TABLET, FILM COATED ORAL at 22:53

## 2020-04-06 RX ADMIN — SODIUM CHLORIDE 91 ML: 9 INJECTION, SOLUTION INTRAVENOUS at 17:05

## 2020-04-06 RX ADMIN — SODIUM CHLORIDE, POTASSIUM CHLORIDE, SODIUM LACTATE AND CALCIUM CHLORIDE 2400 ML: 600; 310; 30; 20 INJECTION, SOLUTION INTRAVENOUS at 16:50

## 2020-04-06 RX ADMIN — SODIUM CHLORIDE: 9 INJECTION, SOLUTION INTRAVENOUS at 22:26

## 2020-04-06 RX ADMIN — GUAIFENESIN 600 MG: 600 TABLET, EXTENDED RELEASE ORAL at 22:53

## 2020-04-06 ASSESSMENT — ACTIVITIES OF DAILY LIVING (ADL)
DRESS: 0-->INDEPENDENT
TOILETING: 0-->INDEPENDENT
FALL_HISTORY_WITHIN_LAST_SIX_MONTHS: NO
RETIRED_EATING: 0-->INDEPENDENT
AMBULATION: 0-->INDEPENDENT
SWALLOWING: 0-->SWALLOWS FOODS/LIQUIDS WITHOUT DIFFICULTY
COGNITION: 0 - NO COGNITION ISSUES REPORTED
TRANSFERRING: 0-->INDEPENDENT
BATHING: 0-->INDEPENDENT
RETIRED_COMMUNICATION: 0-->UNDERSTANDS/COMMUNICATES WITHOUT DIFFICULTY

## 2020-04-06 ASSESSMENT — MIFFLIN-ST. JEOR: SCORE: 1468.13

## 2020-04-06 ASSESSMENT — ENCOUNTER SYMPTOMS
FEVER: 0
SHORTNESS OF BREATH: 1

## 2020-04-06 NOTE — ED PROVIDER NOTES
History     Chief Complaint:  Shortness of Breath      HPI   Jah Tate is a 75 year old male, current every day smoker, with a history of COPD requiring home O2 (basleine 3L but can be up to 4-5L), AAA without rupture, and Alzheimer's Disease who presents via EMS for evaluation of shortness of breath. Patient arrives via EMS with NC in place. Patient is coming from his home. Patient reports shortness of breath since 0600 today, prompting his call to EMS. He denies significant increase in his O2 use at home. On their initial exam, EMS heard bilateral wheezing and therefore provided patient with 1 DuoNeb en route. His O2 sats have been about 95% the whole time he has been with EMS. When patient was hooked up to EMS monitor, his heart rate was at 190 and their monitor showed Atrial fibrillation with RVR, which patient states he has never had before. IV was placed prior to patient's arrival to the emergency department, and with IV placement his heart rate went down to ~100bpm. Here, patient denies chest pain.     Allergies:  Penicillins  Sulfa Drugs  Chantix [Varenicline Tartrate]      Medications:    Albuterol inhaler   Aspirin 81mg   Colchicine   Budenoside neb solution   Duoneb     Past Medical History:    COPD   Late onset Alzheimer's Disease without behavioral disturbance   Idiopathic gout   Hypoxia   Colonic diverticular disease   AAA without rupture     Past Surgical History:    ORIF ankle fracture     Family History:    History reviewed. No pertinent family history.      Social History:  The patient was accompanied to the ED by EMS.  Smoking Status: Current every day smoker 1.00 PPD for 60 years  Smokeless Tobacco: Never  Alcohol Use: No   Marital Status:        Review of Systems   Constitutional: Negative for fever.   Respiratory: Positive for shortness of breath.    Cardiovascular: Negative for chest pain.   All other systems reviewed and are negative.        Physical Exam     Patient Vitals  for the past 24 hrs:   BP Heart Rate Resp SpO2   04/06/20 1605 118/83 111 17 99 %       Physical Exam  General: Alert, interactive in mild distress  Head:  Scalp is atraumatic  Eyes:  The pupils are equal, round, and reactive to light    EOM's intact    No scleral icterus  ENT:      Nose:  The external nose is normal  Ears:  External ears are normal  Mouth/Throat: The oropharynx is normal    Mucus membranes are moist       Neck:  Normal range of motion.      There is no rigidity.    Trachea is in the midline         CV:  Tachycardic rate and regular rhythm    No murmur   Resp:  Occasional wheezes with no respiratory distress     Non-labored, no retractions or accessory muscle use      GI:  Abdomen is soft, no distension, no tenderness.       MS:  Normal strength in all 4 extremities. 1+ edema on BLE        Skin:  Warm and dry, No rash or lesions noted.  Neuro: Strength 5/5 x4.  Sensation intact  In all 4 extremities.        Psych:  Awake. Alert.  Normal affect.      Appropriate interactions.      Emergency Department Course     ECG:  Indication: Shortness of breath  Completed at 1559.  Read at 1605.   Sinus tachycardia   Septal infarct  Abnormal ECG   Rate 112 bpm. GA interval 146. QRS duration 88. QT/QTc 334/455. P-R-T axes 83 53 84.    Imaging:  Radiology findings were communicated with the patient and Admitting MD who voiced understanding of the findings.    CT Chest Pulmonary Embolism w Contrast  Preliminary Result  IMPRESSION:  1.  Dense consolidation and/or atelectasis at the posteromedial left  lower lobe. Underlying lobar pneumonia is possible.  2.  Peribronchial wall thickening most consistent with an infectious  or inflammatory cause at the right lung base.  3.  Diffuse emphysema and coronary artery calcifications.  4.  No convincing evidence for pulmonary embolism.   Report per radiology     XR Chest Port 1 View  Final Result  IMPRESSION: Ill-defined opacity newly seen at the left lung base that  may be  atelectasis or new airspace disease. Hyperinflated lungs may be  seen with COPD. Stable cardiac silhouette. There may be small left  pleural fluid as well.  Report per radiology     Laboratory:  Laboratory findings were communicated with the patient and Admitting MD who voiced understanding of the findings.    CBC: WBC 11.9 (H), HGB 13.0 (L), PLT 93 (L)  o/w WNL.  CMP: Glucose 107 (H), Albumin 3.3 (L) o/w WNL (Creatinine 0.78)     Troponin (Collected 1608): 0.016  D-Dimer: 1.4 (H)      BNP: 479   Procalcitonin: 0.05     Lactic Acid (1609): 2.5 (H)   Lactic Acid: (1748): 2.2 (H)       Blood gas venous: pH Venous 7.31 (L), PCO2 Venous 62 (H), PO2 Venous 21 (L), Bicarbonate 31 (H), Base Excess 2.9, FlO2 5L     UA: Light yellow, clear urine. AWNL   COVID-19 Virus (Coronavirus), PCR NP Swab: pending        Blood cultures x2: Pending    Interventions:  1618 Magnesium 2g IV   1650 LR bolus 2,400mL IV    1754 Levaquin 750mg IV     Emergency Department Course:  Past medical records, nursing notes, and vitals reviewed.    1549 Dressed in PPE with ED team.    Patient arrived via EMS.     1550 I received story from EMS.     1552 I performed my examination of the patient as noted above.    Pads preemptively placed in A/P position on patient by respiratory technician.     1555   bpm  /83  SpO2 99% on NC oxygen     1559 EKG obtained in the ED, see results above.      1607 IV was inserted and blood was drawn for laboratory testing, results above.   The patient's nose was swabbed and this sample was sent for COVID testing, findings above.    The patient provided a urine sample and this was sent for laboratory testing, findings above.    1638  The patient underwent portable CXR while in the emergency department, results above.     1751 The patient was sent for a CT Chest PE w Contrast while in the emergency department, results above.     1835 I rechecked the patient and discussed the results of his workup thus far.      8907 I spoke with Dr. Scott of the Hospitalist service regarding patient's presentation, findings, and plan of care.    Findings and plan explained to the Patient who consents to admission. Discussed the patient with Dr. Scott, who will admit the patient to an inpatient medical bed in the COVID unit for further monitoring, evaluation, and treatment.    I personally reviewed the laboratory and imaging results with the Patient and answered all related questions prior to admission.     Impression & Plan     CMS Diagnoses:  The patient has signs of Severe Sepsis REMINDER: Please use septic shock SmartPhrase for Lactate > 4 or a patient requiring vasopressors after initial fluid bolus (meaning persistent hypotension)      If one the following conditions is present, a 30 mL/kg bolus is recommended as part of the 6 hour bundle (IBW can be used for BMI >30, or document refusal/contraindication):      1.   Initial hypotension  defined as 2 bps < 90 or map < 65 in the 6hrs before or 6hrs after time zero.     2.  Lactate >4.     The patient has signs of Severe Sepsis as evidenced by:    1. 2 SIRS criteria, AND  2. Suspected infection, AND   3. Organ dysfunction: Lactic Acid > 2.0    Time severe sepsis diagnosis confirmed: 1609  04/06/20 as this was the time when Lactate resulted, and the level was > 2.0    3 Hour Severe Sepsis Bundle Completion:    1. Initial Lactic Acid Result:   Recent Labs   Lab Test 04/06/20  1748 04/06/20  1609   LACT 2.2* 2.5*     2. Blood Cultures before Antibiotics: Yes  3. Broad Spectrum Antibiotics Administered:  yes       Anti-infectives (From admission through now)    Start     Dose/Rate Route Frequency Ordered Stop    04/06/20 1702  levofloxacin (LEVAQUIN) infusion 750 mg      750 mg  100 mL/hr over 90 Minutes Intravenous ONCE 04/06/20 1701            4. Fluid volume administered in ED:  Full 30 mL/kg bolus given (see amount below).    BMI Readings from Last 1 Encounters:   03/01/18  25.02 kg/m                    Severe Sepsis reassessment:  1. Repeat Lactic Acid Level: 2.2  2. MAP>65 after initial IVF bolus, will continue to monitor fluid status and vital signs    I attest to having performed a repeat sepsis exam and assessment of perfusion at 1835 and the results demonstrate improved perfusion.     Covid-19  Jah Tate was evaluated during a global COVID-19 pandemic, which necessitated consideration that the patient might be at risk for infection with the SARS-CoV-2 virus that causes COVID-19.   Applicable protocols for evaluation were followed during the patient's care.    Medical Decision Making:  Jah Tate is a 75 year old male who presents to the emergency department today with shortness of breath. Mr. Tate was seen and evaluated. The above work up was undertaken demonstrating left lower lobe infiltrate consistent with pneumonia. He received Levofloxacin as he is allergic to penicillins. He also received magnesium, however I do not believe that he needs bronchodilators at this point in time given the risk for potential COVID and aerosolizing procedures. He has no pain to suggest acute coronary syndrome. Of note, he was noted to be tachycardic for the paramedics. They thought that this was Atrial fibrillation with RVR; patient has no previous diagnosis of this and he remained a sinus rhythm throughout his stay here without intervention. Patient's initial lactic acid was elevated at 2.5. this trended downwards with IV fluids. Patient was admitted under the care of Dr. Scott and colleagues for further evaluation and treatment.     Diagnosis:    ICD-10-CM    1. Community acquired pneumonia of left lower lobe of lung (H)  J18.1    2. Chronic obstructive pulmonary disease, unspecified COPD type (H)  J44.9    3. Atrial fibrillation, unspecified type (H)  I48.91        Disposition:  Admitted to an inpatient medical bed in the COVID unit.    Scribe Disclosure:  I,  Danya Granados, am serving as a scribe at 3:49 PM on 4/6/2020 to document services personally performed by Chente Weber MD based on my observations and the provider's statements to me.    4/6/2020    EMERGENCY DEPARTMENT       Chente Weber MD  04/06/20 1955

## 2020-04-06 NOTE — ED NOTES
Bed: [facility]  Expected date: 4/6/20  Expected time: 3:44 PM  Means of arrival: Ambulance  Comments:  438 75m SVT, SOB  ETA [phone]

## 2020-04-06 NOTE — H&P
"[facility]    History and Physical  Hospitalist       Date of Admission:  4/6/2020  Date of Service (when I saw the patient): 04/06/20    Assessment & Plan   Jah Tate is a 75 year old male who presents with shortness of breath    Left lower lobe Pneumonia  COPD/ emphysema  Acute hypercarbic respiratory failure  COVID rule out  PTA albuterol prn, pulmicort nebs BID, duonebs q6 hours  5L O2 dependent COPD. Poor historian. Appears to have worsening SOB this am. EMS arrived, O2 sats ok (on 5L O2). Afebrile, tachy. Troponin negative, procalcitonin negative. BNP normal. Lactate 2.2. VBG 7.31/62/21/31. WBC 11.9. CT with dense consolidation and/or atelectasis in LLL, peribronchial wall thickening most consistent with an infectious or inflammatory cause in R lung base, emphysema, no PE.   - repeat lactate in the am  - COVID pending  - blood cultures pending  - continue levofloxacin 750 mg IV daily  - combivent inhaler QID  - albuterol inhaler q4 hours prn wheezing/ SOB  - no steroids  - mucinex BID  - IV fluids overnight    Tachycardia  Reported HR to 180's when medics arrived, thought possible afib but not captured and improved by time EMS was getting IV access. ED with HR in the 's, sinus.   - telemetry  - check EKG    Edema  Symmetric edema on exam, 2-3+. States \"comes and goes\" with recumbency. BNP as above is normal  - monitor, IV fluids overnight then discontinue    Thrombocytopenia  Noted plts at 93 on admission. Previous platelets 189 5/2019.  - repeat CBC in am    Gout  PTA colchicine 0.6 mg BID, continue    Tobacco abuse  Still currently smoking 1 PPD. States started smoking at 13 years of age  - ongoing encourage cessation    Alzheimer dementia  Per outpatient notes. Pt is poor historian.     DVT Prophylaxis: Pneumatic Compression Devices  Code Status: Full Code    Disposition: Expected discharge in 2-3 days pending improvement in respiratory status    Vijay Scott, " MD  [phone] (P)  Text Page     Primary Care Physician   Dr. Eric Johnson    Chief Complaint   Shortness of breath    History is obtained from the patient and medical records.  Please note the patient is a very poor historian and history is mostly obtained from the records    History of Present Illness   Jah Tate is a 75 year old male who presents with shortness of breath.  He has a past medical history remarkable for COPD/emphysema on 5 L of oxygen chronically, ongoing tobacco abuse, gout, and early dementia.  Patient reports chronic shortness of breath that gets better and worse over time.  He is unable to describe exactly what happened today that prompted EMS being contacted.  There apparently was not an increase in his O2 needs at home.  EMS arrived and heard bilateral wheezing on exam.  He was given a DuoNeb.  They noted his O2 sats are in the 95% range the entire time.  There is report that when he was hooked up to monitor his heart rate was in the 180s and looked like atrial fibrillation.  However when IV was being placed his heart rate dropped into the 100s and was regular.  The emergency department he denies any chest pain.  He states he is still short of breath.  He complains of cough.  He denies any fevers or chills.  Denies diaphoresis.  He has no known sick contacts.  No nausea or vomiting.  No abdominal pain.    Past Medical History    I have reviewed this patient's medical history and updated it with pertinent information if needed.   Past Medical History:   Diagnosis Date     COPD (chronic obstructive pulmonary disease) (H)    Alzheimer dementia  Gout    Past Surgical History   I have reviewed this patient's surgical history and updated it with pertinent information if needed.  Past Surgical History:   Procedure Laterality Date     FRACTURE TX, ANKLE RT/LT      left- w/plate       Prior to Admission Medications   Prior to Admission Medications   Prescriptions Last Dose Informant  "Patient Reported? Taking?   albuterol (PROAIR HFA/PROVENTIL HFA/VENTOLIN HFA) 108 (90 Base) MCG/ACT inhaler   No No   Sig: INHALE 2 PUFFS BY MOUTH EVERY 6 HOURS AS NEEDED FOR SHORTNESS OF BREATH OR WHEEZING   aspirin 81 MG tablet  Self Yes No   Sig: Take 1 tablet by mouth daily   budesonide (PULMICORT) 0.5 MG/2ML neb solution   No No   Sig: TAKE 2 MLS (0.5 MG) BY NEBULIZATION 2 TIMES DAILY   colchicine (COLCYRS) 0.6 MG tablet   No No   Sig: TAKE 1 TABLET BY MOUTH TWICE A DAY   docusate sodium (DULCOLAX STOOL SOFTENER) 100 MG capsule  Self Yes No   Sig: Take 2 capsules by mouth daily as needed    folic acid (FOLVITE) 1 MG tablet   No No   Sig: Take 1 tablet (1 mg) by mouth daily   guaiFENesin (MUCINEX) 600 MG 12 hr tablet   Yes No   Sig: Take 600 mg by mouth every other day    ipratropium - albuterol 0.5 mg/2.5 mg/3 mL (DUONEB) 0.5-2.5 (3) MG/3ML neb solution   No No   Sig: Take 1 vial (3 mLs) by nebulization every 6 hours   vitamin D3 (CHOLECALCIFEROL) 2000 units (50 mcg) tablet   Yes No   Sig: Take 1 tablet by mouth daily   vitamin E 800 UNIT CAPS  Self Yes No   Sig: Take 1 tablet by mouth daily 450 mg daily      Facility-Administered Medications: None     Allergies   Allergies   Allergen Reactions     Penicillins Swelling     Sulfa Drugs Swelling     Chantix [Varenicline Tartrate]      Hallucinations         Social History   I have reviewed this patient's social history and updated it with pertinent information if needed. Jah Tate  reports that he has been smoking cigarettes. He has a 60.00 pack-year smoking history. He has never used smokeless tobacco. He reports that he does not drink alcohol or use drugs.    Family History   I have reviewed this patient's family history and updated it with pertinent information if needed.   Patient reports \"heart stuff \"in his family but is not more specific    Review of Systems   The 10 point Review of Systems is negative other than noted in the HPI or here.  " Patient is a poor historian    Physical Exam       BP: 118/83   Heart Rate: 106 Resp: 26 SpO2: 100 % O2 Device: Nasal cannula Oxygen Delivery: 5 LPM  Vital Signs with Ranges  0 lbs 0 oz    Constitutional: alert, oriented and in mild respiratory distress  Eyes: EOMI, PERRL  HEENT: OP clear  Respiratory: Breath sounds were distant but I did not hear any wheezing.  No rales or crackles were appreciated.  Cardiovascular: Tachycardia but regular, distant.   2-3+ edema symmetric.  GI: soft, nontender, nondistended, no HSM  Lymph/Hematologic: no cervical LAD  Genitourinary: deferred  Skin: no rashes or lesions grossly  Musculoskeletal: no deformities or arthritis  Neurologic: CN II-XII, MADRIGAL, sensation grossly intact  Psychiatric: mood and affect wnl    Data   Data reviewed today:  I personally reviewed the chest CT image(s) showing Left lower lobe pneumonia, possible right lower lobe pneumonia..  Recent Labs   Lab 04/06/20  1608   WBC 11.9*   HGB 13.0*   MCV 93   PLT 93*      POTASSIUM 4.0   CHLORIDE 105   CO2 28   BUN 15   CR 0.78   ANIONGAP 6   SHEEBA 8.6   *   ALBUMIN 3.3*   PROTTOTAL 7.1   BILITOTAL 0.2   ALKPHOS 91   ALT 14   AST 15   TROPI 0.016       Recent Results (from the past 24 hour(s))   XR Chest Port 1 View    Narrative    CHEST PORTABLE ONE VIEW   4/6/2020 4:38 PM     HISTORY: Shortness of breath.    COMPARISON: 4/4/2015.      Impression    IMPRESSION: Ill-defined opacity newly seen at the left lung base that  may be atelectasis or new airspace disease. Hyperinflated lungs may be  seen with COPD. Stable cardiac silhouette. There may be small left  pleural fluid as well.    DIANE MUNIZ MD   CT Chest Pulmonary Embolism w Contrast    Narrative    CT CHEST PULMONARY EMBOLISM WITH CONTRAST 4/6/2020 5:40 PM    CLINICAL HISTORY: PE suspected, intermediate probability, positive  D-dimer. Shortness of breath.    TECHNIQUE: CT angiogram chest during arterial phase injection IV  contrast. 2D and 3D MIP  reconstructions were performed by the CT  technologist. Dose reduction techniques were used.     CONTRAST: 66mL Isovue-370    COMPARISON: Chest x-ray 4/6/2020, CT abdomen and pelvis 8/2/2013.    FINDINGS:  ANGIOGRAM CHEST: Pulmonary arteries are normal caliber and negative  for pulmonary emboli. Thoracic aorta is negative for dissection.  Diffuse coronary artery calcifications.    LUNGS AND PLEURA: Trace left pleural fluid. Dense consolidation and/or  atelectasis along the posteromedial left lower lobe. Atelectasis at  the posterior right lower lobe. Diffuse emphysematous changes.  Peribronchial wall thickening at the right lower lobe base.    MEDIASTINUM/AXILLAE: A few mildly prominent mediastinal lymph nodes  are noted.    UPPER ABDOMEN: No acute abnormality.    MUSCULOSKELETAL: No acute abnormality.      Impression    IMPRESSION:  1.  Dense consolidation and/or atelectasis at the posteromedial left  lower lobe. Underlying lobar pneumonia is possible.  2.  Peribronchial wall thickening most consistent with an infectious  or inflammatory cause at the right lung base.  3.  Diffuse emphysema and coronary artery calcifications.  4.  No convincing evidence for pulmonary embolism.     DIANE MUNIZ MD

## 2020-04-06 NOTE — ED NOTES
[facility]  ED Nurse Handoff Report    ED Chief complaint: Shortness of Breath      ED Diagnosis:   Final diagnoses:   None       Code Status: Full Code    Allergies:   Allergies   Allergen Reactions     Penicillins Swelling     Sulfa Drugs Swelling     Chantix [Varenicline Tartrate]      Hallucinations         Patient Story: Shortness of breath starting this AM around 0600. Hx of COPD, continues to smoke. Pt felt SOB and wheezy, worsening this afternoon that prompted call to EMS.   Focused Assessment:  Denies CP, sinus tach on monitor. Pt c/o SOB, worsening over the past few days. Diffuse wheezes throughout, no respiratory distress. A&O x4. Denies pain.    Treatments and/or interventions provided: 2400ml LR bolus, 750mg levaquin IV, 2g mag sulfate.  Patient's response to treatments and/or interventions: Lactic trending down.    To be done/followed up on inpatient unit:  n/a    Does this patient have any cognitive concerns?: None    Activity level - Baseline/Home:  Independent  Activity Level - Current:   Unknown    Patient's Preferred language: English   Needed?: No    Isolation: Contact and Droplet- COVID-19 R/o  Infection: Not Applicable  Other- COVID-19 pending  Bariatric?: No    Vital Signs:   Vitals:    04/06/20 1605 04/06/20 1645   BP: 118/83    Resp: 17 26   SpO2: 99% 100%       Cardiac Rhythm:Cardiac Rhythm: Sinus tachycardia    Was the PSS-3 completed:   Yes  What interventions are required if any?               Family Comments: n/a  OBS brochure/video discussed/provided to patient/family: N/a              Name of person given brochure if not patient: n/a              Relationship to patient: n/a    For the majority of the shift this patient's behavior was Green.   Behavioral interventions performed were n/a.    ED NURSE PHONE NUMBER: *[phone]

## 2020-04-07 LAB
ERYTHROCYTE [DISTWIDTH] IN BLOOD BY AUTOMATED COUNT: 15.6 % (ref 10–15)
HCT VFR BLD AUTO: 35.5 % (ref 40–53)
HGB BLD-MCNC: 11.2 G/DL (ref 13.3–17.7)
LACTATE BLD-SCNC: 0.6 MMOL/L (ref 0.7–2)
MCH RBC QN AUTO: 29.1 PG (ref 26.5–33)
MCHC RBC AUTO-ENTMCNC: 31.5 G/DL (ref 31.5–36.5)
MCV RBC AUTO: 92 FL (ref 78–100)
PLATELET # BLD AUTO: 65 10E9/L (ref 150–450)
RBC # BLD AUTO: 3.85 10E12/L (ref 4.4–5.9)
SARS-COV-2 PCR COMMENT: NORMAL
SARS-COV-2 RNA SPEC QL NAA+PROBE: NORMAL
SARS-COV-2 RNA SPEC QL NAA+PROBE: NORMAL
SPECIMEN SOURCE: NORMAL
SPECIMEN SOURCE: NORMAL
WBC # BLD AUTO: 7.6 10E9/L (ref 4–11)

## 2020-04-07 PROCEDURE — 99232 SBSQ HOSP IP/OBS MODERATE 35: CPT | Performed by: INTERNAL MEDICINE

## 2020-04-07 PROCEDURE — 25000125 ZZHC RX 250: Performed by: INTERNAL MEDICINE

## 2020-04-07 PROCEDURE — 25000131 ZZH RX MED GY IP 250 OP 636 PS 637: Performed by: INTERNAL MEDICINE

## 2020-04-07 PROCEDURE — 36415 COLL VENOUS BLD VENIPUNCTURE: CPT | Performed by: INTERNAL MEDICINE

## 2020-04-07 PROCEDURE — 12000000 ZZH R&B MED SURG/OB

## 2020-04-07 PROCEDURE — 85027 COMPLETE CBC AUTOMATED: CPT | Performed by: INTERNAL MEDICINE

## 2020-04-07 PROCEDURE — 83605 ASSAY OF LACTIC ACID: CPT | Performed by: INTERNAL MEDICINE

## 2020-04-07 PROCEDURE — 25000132 ZZH RX MED GY IP 250 OP 250 PS 637: Performed by: INTERNAL MEDICINE

## 2020-04-07 RX ORDER — PREDNISONE 20 MG/1
40 TABLET ORAL DAILY
Status: DISCONTINUED | OUTPATIENT
Start: 2020-04-07 | End: 2020-04-09 | Stop reason: HOSPADM

## 2020-04-07 RX ADMIN — PREDNISONE 40 MG: 20 TABLET ORAL at 14:07

## 2020-04-07 RX ADMIN — GUAIFENESIN 600 MG: 600 TABLET, EXTENDED RELEASE ORAL at 09:47

## 2020-04-07 RX ADMIN — ALBUTEROL SULFATE 2 PUFF: 90 INHALANT RESPIRATORY (INHALATION) at 02:20

## 2020-04-07 RX ADMIN — GUAIFENESIN 600 MG: 600 TABLET, EXTENDED RELEASE ORAL at 21:01

## 2020-04-07 RX ADMIN — IPRATROPIUM BROMIDE AND ALBUTEROL 1 PUFF: 20; 100 SPRAY, METERED RESPIRATORY (INHALATION) at 06:55

## 2020-04-07 RX ADMIN — COLCHICINE 0.6 MG: 0.6 TABLET, FILM COATED ORAL at 09:47

## 2020-04-07 RX ADMIN — LEVOFLOXACIN 750 MG: 750 TABLET, FILM COATED ORAL at 17:52

## 2020-04-07 RX ADMIN — IPRATROPIUM BROMIDE AND ALBUTEROL 1 PUFF: 20; 100 SPRAY, METERED RESPIRATORY (INHALATION) at 15:06

## 2020-04-07 RX ADMIN — IPRATROPIUM BROMIDE AND ALBUTEROL 1 PUFF: 20; 100 SPRAY, METERED RESPIRATORY (INHALATION) at 21:01

## 2020-04-07 RX ADMIN — IPRATROPIUM BROMIDE AND ALBUTEROL 1 PUFF: 20; 100 SPRAY, METERED RESPIRATORY (INHALATION) at 11:05

## 2020-04-07 RX ADMIN — ASPIRIN 81 MG: 81 TABLET, DELAYED RELEASE ORAL at 09:47

## 2020-04-07 RX ADMIN — COLCHICINE 0.6 MG: 0.6 TABLET, FILM COATED ORAL at 21:01

## 2020-04-07 ASSESSMENT — ACTIVITIES OF DAILY LIVING (ADL)
ADLS_ACUITY_SCORE: 10
ADLS_ACUITY_SCORE: 12
ADLS_ACUITY_SCORE: 10

## 2020-04-07 NOTE — PLAN OF CARE
Pt  on 5 liters of 02 per N/C does desat with activity and breath sounds are decreased and occasional wheeze. Alert and oriented x4, pt up to the side of the bed with assistance of 1. Pt uses the urinal at the bedside. Pt only ate lunch and ate well. VSS afebrile.

## 2020-04-07 NOTE — PLAN OF CARE
Patient transferred from station 66. A&O x4, [place]. On tele with SR. Up w/ one assist to bathroom. Voiding per urinal. On 5LNC. Denies chest pain or SOB @ rest. Dyspnea on exertion. Will continue to monitor.

## 2020-04-07 NOTE — PHARMACY-ADMISSION MEDICATION HISTORY
Pharmacy Medication History  Admission medication history interview status for the 4/6/2020  admission is complete. See EPIC admission navigator for prior to admission medications     Medication history sources: Patient's family/friend (wife)  Medication history source reliability: Good  Adherence assessment: Good    Significant changes made to the medication list:  none      Additional medication history information:   Has albuterol inhaler with him    Medication reconciliation completed by provider prior to medication history? No    Time spent in this activity: 10 minutes      Prior to Admission medications    Medication Sig Last Dose Taking? Auth Provider   albuterol (PROAIR HFA/PROVENTIL HFA/VENTOLIN HFA) 108 (90 Base) MCG/ACT inhaler INHALE 2 PUFFS BY MOUTH EVERY 6 HOURS AS NEEDED FOR SHORTNESS OF BREATH OR WHEEZING  at prn Yes Eric Johnson MD   aspirin 81 MG tablet Take 1 tablet by mouth daily 4/6/2020 at am Yes Reported, Patient   budesonide (PULMICORT) 0.5 MG/2ML neb solution TAKE 2 MLS (0.5 MG) BY NEBULIZATION 2 TIMES DAILY 4/6/2020 at am Yes Elvira Hilliard APRN CNP   colchicine (COLCYRS) 0.6 MG tablet TAKE 1 TABLET BY MOUTH TWICE A DAY 4/6/2020 at am Yes Eric Johnson MD   docusate sodium (DULCOLAX STOOL SOFTENER) 100 MG capsule Take 2 capsules by mouth daily as needed  4/6/2020 at am Yes Reported, Patient   folic acid (FOLVITE) 1 MG tablet Take 1 tablet (1 mg) by mouth daily 4/6/2020 at am Yes Eric Johnson MD   guaiFENesin (MUCINEX) 600 MG 12 hr tablet Take 600 mg by mouth every other day  4/6/2020 at am Yes    ipratropium - albuterol 0.5 mg/2.5 mg/3 mL (DUONEB) 0.5-2.5 (3) MG/3ML neb solution Take 1 vial (3 mLs) by nebulization every 6 hours  at prn Yes Eric Johnson MD   vitamin D3 (CHOLECALCIFEROL) 2000 units (50 mcg) tablet Take 1 tablet by mouth daily 4/6/2020 at am Yes Reported, Patient   vitamin E 800 UNIT CAPS Take 1 tablet by mouth daily 450 mg daily 4/6/2020 at  am Yes Reported, Patient     Karissa Schafferer, PharmD  Inpatient Clinical Pharmacist  [phone]

## 2020-04-07 NOTE — PROGRESS NOTES
"[facility]  Hospitalist Progress Note  Ross Allen MD  04/07/2020    Assessment & Plan   Jah Tate is a 75 year old male who presents with shortness of breath     Left lower lobe Pneumonia  COPD/ emphysema  Acute hypercarbic respiratory failure  COVID rule out  PTA albuterol prn, pulmicort nebs BID, duonebs q6 hours  5L O2 dependent COPD. Poor historian. Appears to have worsening SOB this am. EMS arrived, O2 sats ok (on 5L O2). Afebrile, tachy. Troponin negative, procalcitonin negative. BNP normal. Lactate 2.2. VBG 7.31/62/21/31. WBC 11.9. CT with dense consolidation and/or atelectasis in LLL, peribronchial wall thickening most consistent with an infectious or inflammatory cause in R lung base, emphysema, no PE.   - repeat lactate in the am  - COVID negative  - blood cultures pending  - continue levofloxacin 750 mg IV daily, change to po  - combivent inhaler QID  - albuterol inhaler q4 hours prn wheezing/ SOB  - will place on po prednisone 40 mg daily for 5 days.  - mucinex BID  - SL IVF     Tachycardia  Reported HR to 180's when medics arrived, thought possible afib but not captured and improved by time EMS was getting IV access. ED with HR in the 's, sinus.   - telemetry shows NSR  - EKG shows ST, will discontinue telemetry     Edema  Symmetric edema on exam, 2-3+. States \"comes and goes\" with recumbency. BNP as above is normal  - SL IVF     Thrombocytopenia  Noted plts at 93 on admission. Previous platelets 189 5/2019.  - repeat CBC in am     Gout  PTA colchicine 0.6 mg BID, continue     Tobacco abuse  Still currently smoking 1 PPD. States started smoking at 13 years of age  -- ongoing encourage cessation  -- declines nicotine patch     Alzheimer dementia  -- Per outpatient notes. Pt is poor historian.   -- he is very [place]     DVT Prophylaxis: Pneumatic Compression Devices    Code Status: Full Code     Disposition:   -- anticipate in 1-2 days    Interval History   -- chart " "reviewed  -- he is weaned down to 5 L  -- feeling a little better    -Data reviewed today: I reviewed all new labs and imaging over the last 24 hours. I personally reviewed no images or EKG's today.    Physical Exam   Heart Rate: 83, Blood pressure 123/65, pulse 72, temperature 98.4  F (36.9  C), temperature source Oral, resp. rate 22, height 1.803 m (5' 11\"), weight 72.1 kg (158 lb 15.2 oz), SpO2 95 %.  Vitals:    04/06/20 2300   Weight: 72.1 kg (158 lb 15.2 oz)     Vital Signs with Ranges  Temp:  [97.4  F (36.3  C)-98.4  F (36.9  C)] 98.4  F (36.9  C)  Pulse:  [] 72  Heart Rate:  [] 83  Resp:  [11-26] 22  BP: (100-129)/(58-83) 123/65  SpO2:  [92 %-100 %] 95 %  I/O's Last 24 hours  I/O last 3 completed shifts:  In: 150 [IV Piggyback:150]  Out: 925 [Urine:925]    Constitutional: Awake, alert, cooperative, no apparent distress  Respiratory: Severely diminished with faint high pitch wheezing, barrel chested, prolonged exp phase  Cardiovascular: Regular rate and rhythm, normal S1 and S2, and no murmur noted  GI: Normal bowel sounds, soft, non-distended, non-tender  Skin/Integumen: No rashes, no cyanosis, trace edema  Other:      Medications   All medications were reviewed.      aspirin  81 mg Oral Daily     colchicine  0.6 mg Oral BID     guaiFENesin  600 mg Oral BID     Ipratropium-Albuterol  1 puff Inhalation 4x daily     levofloxacin  750 mg Oral Daily     sodium chloride (PF)  3 mL Intracatheter Q8H        Data   Recent Labs   Lab 04/07/20  1057 04/06/20  1608   WBC 7.6 11.9*   HGB 11.2* 13.0*   MCV 92 93   PLT 65* 93*   NA  --  139   POTASSIUM  --  4.0   CHLORIDE  --  105   CO2  --  28   BUN  --  15   CR  --  0.78   ANIONGAP  --  6   SHEEBA  --  8.6   GLC  --  107*   ALBUMIN  --  3.3*   PROTTOTAL  --  7.1   BILITOTAL  --  0.2   ALKPHOS  --  91   ALT  --  14   AST  --  15   TROPI  --  0.016       Recent Results (from the past 24 hour(s))   XR Chest Port 1 View    Narrative    CHEST PORTABLE ONE VIEW   " 4/6/2020 4:38 PM     HISTORY: Shortness of breath.    COMPARISON: 4/4/2015.      Impression    IMPRESSION: Ill-defined opacity newly seen at the left lung base that  may be atelectasis or new airspace disease. Hyperinflated lungs may be  seen with COPD. Stable cardiac silhouette. There may be small left  pleural fluid as well.    DIANE MUNIZ MD   CT Chest Pulmonary Embolism w Contrast    Narrative    CT CHEST PULMONARY EMBOLISM WITH CONTRAST 4/6/2020 5:40 PM    CLINICAL HISTORY: PE suspected, intermediate probability, positive  D-dimer. Shortness of breath.    TECHNIQUE: CT angiogram chest during arterial phase injection IV  contrast. 2D and 3D MIP reconstructions were performed by the CT  technologist. Dose reduction techniques were used.     CONTRAST: 66mL [id number]    COMPARISON: Chest x-ray 4/6/2020, CT abdomen and pelvis 8/2/2013.    FINDINGS:  ANGIOGRAM CHEST: Pulmonary arteries are normal caliber and negative  for pulmonary emboli. Thoracic aorta is negative for dissection.  Diffuse coronary artery calcifications.    LUNGS AND PLEURA: Trace left pleural fluid. Dense consolidation and/or  atelectasis along the posteromedial left lower lobe. Atelectasis at  the posterior right lower lobe. Diffuse emphysematous changes.  Peribronchial wall thickening at the right lower lobe base.    MEDIASTINUM/AXILLAE: A few mildly prominent mediastinal lymph nodes  are noted.    UPPER ABDOMEN: No acute abnormality.    MUSCULOSKELETAL: No acute abnormality.      Impression    IMPRESSION:  1.  Dense consolidation and/or atelectasis at the posteromedial left  lower lobe. Underlying lobar pneumonia is possible.  2.  Peribronchial wall thickening most consistent with an infectious  or inflammatory cause at the right lung base.  3.  Diffuse emphysema and coronary artery calcifications.  4.  No convincing evidence for pulmonary embolism.     MD Ross CURRAN MD  Text Page  (7am to 6pm)

## 2020-04-07 NOTE — PROGRESS NOTES
RECEIVING UNIT ED HANDOFF REVIEW    ED Nurse Handoff Report was reviewed by: Abigail Mackenzie RN on April 6, 2020 at 9:05 PM

## 2020-04-07 NOTE — PLAN OF CARE
DATE & TIME: 04/06/20 7683-5449  Cognitive Concerns/ Orientation : A&O x4  BEHAVIOR & AGGRESSION TOOL COLOR: Green, calm/cooperative  CIWA SCORE: NA  ABNL VS/O2: VSS on 5L O2; ALONSO desats to 85% on extertion, sats improve to 90-96% at rest  MOBILITY: ALONSO; SBA-Ax1  PAIN MANAGMENT: Denies  DIET: Regular  BOWEL/BLADDER: Urinal at bedside. Educated on calling before sitting at bedside.  ABNL LAB/BG: Platelets 93  DRAIN/DEVICES: PIV L arm, PIV R arm  TELEMETRY RHYTHM: NS  SKIN: BLE edema  TESTS/PROCEDURES: Swabbed for COVID 4/6  D/C DAY/GOALS/PLACE: Pending respiratory improvement and COVID results  OTHER IMPORTANT INFO: Productive cough at baseline per pt. Hx COPD. PRN Albuterol given x1.

## 2020-04-08 PROBLEM — F17.200 SMOKER: Status: ACTIVE | Noted: 2020-04-08

## 2020-04-08 PROCEDURE — 25000132 ZZH RX MED GY IP 250 OP 250 PS 637: Performed by: INTERNAL MEDICINE

## 2020-04-08 PROCEDURE — 25000131 ZZH RX MED GY IP 250 OP 636 PS 637: Performed by: INTERNAL MEDICINE

## 2020-04-08 PROCEDURE — 99232 SBSQ HOSP IP/OBS MODERATE 35: CPT | Performed by: INTERNAL MEDICINE

## 2020-04-08 PROCEDURE — 12000000 ZZH R&B MED SURG/OB

## 2020-04-08 RX ORDER — BUDESONIDE 0.5 MG/2ML
0.5 INHALANT ORAL 2 TIMES DAILY
Status: DISCONTINUED | OUTPATIENT
Start: 2020-04-08 | End: 2020-04-09 | Stop reason: HOSPADM

## 2020-04-08 RX ORDER — COLCHICINE 0.6 MG/1
0.6 TABLET ORAL 2 TIMES DAILY PRN
Status: DISCONTINUED | OUTPATIENT
Start: 2020-04-08 | End: 2020-04-09 | Stop reason: HOSPADM

## 2020-04-08 RX ADMIN — IPRATROPIUM BROMIDE AND ALBUTEROL 1 PUFF: 20; 100 SPRAY, METERED RESPIRATORY (INHALATION) at 10:54

## 2020-04-08 RX ADMIN — IPRATROPIUM BROMIDE AND ALBUTEROL 1 PUFF: 20; 100 SPRAY, METERED RESPIRATORY (INHALATION) at 21:15

## 2020-04-08 RX ADMIN — IPRATROPIUM BROMIDE AND ALBUTEROL 1 PUFF: 20; 100 SPRAY, METERED RESPIRATORY (INHALATION) at 16:46

## 2020-04-08 RX ADMIN — ASPIRIN 81 MG: 81 TABLET, DELAYED RELEASE ORAL at 08:38

## 2020-04-08 RX ADMIN — ALBUTEROL SULFATE 2 PUFF: 90 INHALANT RESPIRATORY (INHALATION) at 21:23

## 2020-04-08 RX ADMIN — COLCHICINE 0.6 MG: 0.6 TABLET, FILM COATED ORAL at 08:38

## 2020-04-08 RX ADMIN — LEVOFLOXACIN 750 MG: 750 TABLET, FILM COATED ORAL at 17:42

## 2020-04-08 RX ADMIN — IPRATROPIUM BROMIDE AND ALBUTEROL 1 PUFF: 20; 100 SPRAY, METERED RESPIRATORY (INHALATION) at 06:06

## 2020-04-08 RX ADMIN — SENNOSIDES AND DOCUSATE SODIUM 2 TABLET: 8.6; 5 TABLET ORAL at 08:47

## 2020-04-08 RX ADMIN — PREDNISONE 40 MG: 20 TABLET ORAL at 08:38

## 2020-04-08 RX ADMIN — ALBUTEROL SULFATE 2 PUFF: 90 INHALANT RESPIRATORY (INHALATION) at 03:25

## 2020-04-08 RX ADMIN — SENNOSIDES AND DOCUSATE SODIUM 1 TABLET: 8.6; 5 TABLET ORAL at 21:26

## 2020-04-08 ASSESSMENT — ACTIVITIES OF DAILY LIVING (ADL)
ADLS_ACUITY_SCORE: 12

## 2020-04-08 NOTE — PROGRESS NOTES
[facility]  Hospitalist Progress Note  Pj Velásquez MD  04/08/2020    Assessment & Plan   Jah Tate is a 75 year old male who presents with shortness of breath on 4/6/20:     Left lower lobe Pneumonia  COPD/ emphysema  Acute hypercarbic respiratory failure  COVID rule out  PTA albuterol prn, pulmicort nebs BID, duonebs q6 hours  5L O2 dependent COPD. Poor historian. Appears to have worsening SOB this am. EMS arrived, O2 sats ok (on 5L O2). Afebrile, tachy. Troponin negative, procalcitonin negative. BNP normal. Lactate 2.2. VBG 7.31/62/21/31. WBC 11.9. CT with dense consolidation and/or atelectasis in LLL, peribronchial wall thickening most consistent with an infectious or inflammatory cause in R lung base, emphysema, no PE.   - repeat lactate in the am  - COVID negative  - blood cultures pending  - on levofloxacin 750 mg po daily  - combivent inhaler QID  - albuterol inhaler q4 hours prn wheezing/ SOB  - cont prednisone 40 mg daily for 5 days, start Pulmicort neb bid   - stop mucinex (secretions thick)  - SL IVF     Tachycardia  Reported HR to 180's when medics arrived, thought possible afib but not captured and improved by time EMS was getting IV access. ED with HR in the 's, sinus.   - telemetry shows NSR, stopped tele     Edema  - better, probably from lying in bed      Thrombocytopenia  Noted plts at 93 on admission. Previous platelets 189 5/2019.  - repeat CBC in am     Gout  PTA colchicine 0.6 mg BID, will change to PRN      Tobacco abuse  Still currently smoking 1 PPD. States started smoking at 13 years of age  -- ongoing encourage cessation  -- declines nicotine patch     Alzheimer dementia  -- Per outpatient notes. Pt is poor historian.  Will check Vit B12 and TSH in am    Plan -- discussed with wife, she agrees with DNR/DNI (order placed).  Anticipate home with home care tomorrow, vs TCU (PT eval ordered).       DVT Prophylaxis: Pneumatic Compression  "Devices    Code Status: Full Code     Disposition:   -- anticipate in 1-2 days    Pj Velásquez MD  Pager: [phone]  Cell Phone:  [phone]         Interval History   Feels weak today, coughing with yellow sputum.  He is on 5 LPM O2 at home.     Physical Exam   Heart Rate: 83, Blood pressure 138/74, pulse 72, temperature 97.2  F (36.2  C), temperature source Oral, resp. rate 24, height 1.803 m (5' 11\"), weight 72.1 kg (158 lb 15.2 oz), SpO2 92 %.  Vitals:    04/06/20 2300   Weight: 72.1 kg (158 lb 15.2 oz)     Vital Signs with Ranges  Temp:  [97.2  F (36.2  C)-98.4  F (36.9  C)] 97.2  F (36.2  C)  Pulse:  [72-79] 72  Heart Rate:  [68-90] 83  Resp:  [18-24] 24  BP: (110-138)/(64-80) 138/74  SpO2:  [92 %-97 %] 92 %  I/O's Last 24 hours  I/O last 3 completed shifts:  In: 300 [P.O.:300]  Out: 1470 [Urine:1470]    Constitutional: Awake, alert, cooperative, no apparent distress  Respiratory: Mild bilateral expiratory wheeze  Cardiovascular: Regular rate and rhythm, normal S1 and S2, and no murmur noted  GI: Normal bowel sounds, soft, non-distended, non-tender  Skin/Integumen: No rashes, no cyanosis, trace edema bilateral ankles  Neuro: Alert, Ox1.5 (guessed he was in hospital, not sure of the name, date is March or April 2001), generalized weakness     Medications   All medications were reviewed.      aspirin  81 mg Oral Daily     colchicine  0.6 mg Oral BID     guaiFENesin  600 mg Oral BID     Ipratropium-Albuterol  1 puff Inhalation 4x daily     levofloxacin  750 mg Oral Daily     predniSONE  40 mg Oral Daily     sodium chloride (PF)  3 mL Intracatheter Q8H        Data   Recent Labs   Lab 04/07/20  1057 04/06/20  1608   WBC 7.6 11.9*   HGB 11.2* 13.0*   MCV 92 93   PLT 65* 93*   NA  --  139   POTASSIUM  --  4.0   CHLORIDE  --  105   CO2  --  28   BUN  --  15   CR  --  0.78   ANIONGAP  --  6   SHEEBA  --  8.6   GLC  --  107*   ALBUMIN  --  3.3*   PROTTOTAL  --  7.1   BILITOTAL  --  0.2   ALKPHOS  --  91 "   ALT  --  14   AST  --  15   TROPI  --  0.016       No results found for this or any previous visit (from the past 24 hour(s)).

## 2020-04-08 NOTE — CONSULTS
Care Transition Initial Assessment - SW     Met with: Patient    Principal Problem:    CAP (community acquired pneumonia)  Active Problems:    COPD on Home O2     Chronic gout of multiple sites    Late onset Alzheimer's disease without behavioral disturbance (H)    Smoker       DATA  Lives With: spouse, house   Quality of Family Relationships: supportive, involved  Description of Support System: Involved  Who is your support system?: Wife  Support Assessment: Adequate family and caregiver support.   Identified issues/concerns regarding health management: Noted consult to discuss TCU with pt. He has [employer]. PT has yet to eval. Met with pt to provide him with the [employer] TCU list. He was not aware of conversation between wife and MD about TCU. His goal is to discharge home. Discussed/explained TCU. Encouraged him to speak with his wife about her concerns. He agreed.       Quality of Family Relationships: supportive, involved     ASSESSMENT  Cognitive Status: Appeared alert and oriented.  Concerns to be addressed: On-going discharge planning.     PLAN  Financial costs for the patient includes: None.  Patient given options and choices for discharge: Yes.  Patient/family is agreeable to the plan? YES  Patient Goals and Preferences: home vs TCU.  Patient anticipates discharging to: home vs TCU.    PAULINA Durham, LGSW  [phone]  [facility]

## 2020-04-08 NOTE — PROVIDER NOTIFICATION
Paged hospitalist regarding no IV access, patient is on tele. Wondering, if tele can be discontinue per patient's request.

## 2020-04-08 NOTE — PLAN OF CARE
Patient is A&O x4, [place]. On 5LNC, sat above 90's with activity. Denies chest pain or pain in general. Dyspnea on exertion. Up with one assist to bathroom. Voiding per urinal. No IV access, MD aware. Will continue to monitor.

## 2020-04-08 NOTE — PLAN OF CARE
PT A/Ox4. VSS on 5L NC. Tele: NSR. Up w/ SBA/ A1. Voiding in urinal. Dyspnea on exertion. PRN albuterol inhaler given x1 for SOB. Denies chest pain. Will continue to monitor

## 2020-04-09 ENCOUNTER — APPOINTMENT (OUTPATIENT)
Dept: PHYSICAL THERAPY | Facility: CLINIC | Age: 75
DRG: 871 | End: 2020-04-09
Attending: INTERNAL MEDICINE
Payer: COMMERCIAL

## 2020-04-09 VITALS
BODY MASS INDEX: 22.25 KG/M2 | WEIGHT: 158.95 LBS | OXYGEN SATURATION: 95 % | TEMPERATURE: 97.5 F | SYSTOLIC BLOOD PRESSURE: 126 MMHG | HEIGHT: 71 IN | DIASTOLIC BLOOD PRESSURE: 75 MMHG | HEART RATE: 75 BPM | RESPIRATION RATE: 20 BRPM

## 2020-04-09 LAB
ANION GAP SERPL CALCULATED.3IONS-SCNC: 6 MMOL/L (ref 3–14)
BUN SERPL-MCNC: 15 MG/DL (ref 7–30)
CALCIUM SERPL-MCNC: 8.8 MG/DL (ref 8.5–10.1)
CHLORIDE SERPL-SCNC: 102 MMOL/L (ref 94–109)
CO2 SERPL-SCNC: 30 MMOL/L (ref 20–32)
CREAT SERPL-MCNC: 0.87 MG/DL (ref 0.66–1.25)
ERYTHROCYTE [DISTWIDTH] IN BLOOD BY AUTOMATED COUNT: 14.9 % (ref 10–15)
GFR SERPL CREATININE-BSD FRML MDRD: 83 ML/MIN/{1.73_M2}
GLUCOSE SERPL-MCNC: 91 MG/DL (ref 70–99)
HCT VFR BLD AUTO: 38.4 % (ref 40–53)
HGB BLD-MCNC: 12.4 G/DL (ref 13.3–17.7)
MCH RBC QN AUTO: 29.2 PG (ref 26.5–33)
MCHC RBC AUTO-ENTMCNC: 32.3 G/DL (ref 31.5–36.5)
MCV RBC AUTO: 91 FL (ref 78–100)
PLATELET # BLD AUTO: 121 10E9/L (ref 150–450)
POTASSIUM SERPL-SCNC: 3.7 MMOL/L (ref 3.4–5.3)
RBC # BLD AUTO: 4.24 10E12/L (ref 4.4–5.9)
SODIUM SERPL-SCNC: 138 MMOL/L (ref 133–144)
TSH SERPL DL<=0.005 MIU/L-ACNC: 2.95 MU/L (ref 0.4–4)
URATE SERPL-MCNC: 4.4 MG/DL (ref 3.5–7.2)
VIT B12 SERPL-MCNC: 398 PG/ML (ref 193–986)
WBC # BLD AUTO: 10.4 10E9/L (ref 4–11)

## 2020-04-09 PROCEDURE — 40000893 ZZH STATISTIC PT IP EVAL DEFER

## 2020-04-09 PROCEDURE — 36415 COLL VENOUS BLD VENIPUNCTURE: CPT | Performed by: INTERNAL MEDICINE

## 2020-04-09 PROCEDURE — 82607 VITAMIN B-12: CPT | Performed by: INTERNAL MEDICINE

## 2020-04-09 PROCEDURE — 85027 COMPLETE CBC AUTOMATED: CPT | Performed by: INTERNAL MEDICINE

## 2020-04-09 PROCEDURE — 84443 ASSAY THYROID STIM HORMONE: CPT | Performed by: INTERNAL MEDICINE

## 2020-04-09 PROCEDURE — 25000132 ZZH RX MED GY IP 250 OP 250 PS 637: Performed by: INTERNAL MEDICINE

## 2020-04-09 PROCEDURE — 40000275 ZZH STATISTIC RCP TIME EA 10 MIN

## 2020-04-09 PROCEDURE — 84550 ASSAY OF BLOOD/URIC ACID: CPT | Performed by: INTERNAL MEDICINE

## 2020-04-09 PROCEDURE — 99239 HOSP IP/OBS DSCHRG MGMT >30: CPT | Performed by: INTERNAL MEDICINE

## 2020-04-09 PROCEDURE — 25000131 ZZH RX MED GY IP 250 OP 636 PS 637: Performed by: INTERNAL MEDICINE

## 2020-04-09 PROCEDURE — 25000125 ZZHC RX 250: Performed by: INTERNAL MEDICINE

## 2020-04-09 PROCEDURE — 94640 AIRWAY INHALATION TREATMENT: CPT

## 2020-04-09 PROCEDURE — 80048 BASIC METABOLIC PNL TOTAL CA: CPT | Performed by: INTERNAL MEDICINE

## 2020-04-09 PROCEDURE — 94640 AIRWAY INHALATION TREATMENT: CPT | Mod: 76

## 2020-04-09 RX ORDER — COLCHICINE 0.6 MG/1
0.6 TABLET ORAL 2 TIMES DAILY PRN
Qty: 60 TABLET | Refills: 0
Start: 2020-04-09 | End: 2021-01-25

## 2020-04-09 RX ORDER — IPRATROPIUM BROMIDE AND ALBUTEROL SULFATE 2.5; .5 MG/3ML; MG/3ML
1 SOLUTION RESPIRATORY (INHALATION) 4 TIMES DAILY
Qty: 40 VIAL | Refills: 3 | Status: ON HOLD | OUTPATIENT
Start: 2020-04-09 | End: 2020-07-01

## 2020-04-09 RX ORDER — LEVOFLOXACIN 750 MG/1
750 TABLET, FILM COATED ORAL DAILY
Qty: 5 TABLET | Refills: 0 | Status: SHIPPED | OUTPATIENT
Start: 2020-04-09 | End: 2020-04-16

## 2020-04-09 RX ORDER — IPRATROPIUM BROMIDE AND ALBUTEROL SULFATE 2.5; .5 MG/3ML; MG/3ML
3 SOLUTION RESPIRATORY (INHALATION)
Status: DISCONTINUED | OUTPATIENT
Start: 2020-04-09 | End: 2020-04-09 | Stop reason: HOSPADM

## 2020-04-09 RX ORDER — ALBUTEROL SULFATE 90 UG/1
2 AEROSOL, METERED RESPIRATORY (INHALATION) EVERY 4 HOURS PRN
Qty: 18 INHALER | Refills: 0
Start: 2020-04-09 | End: 2020-05-11

## 2020-04-09 RX ORDER — PREDNISONE 20 MG/1
TABLET ORAL
Qty: 7 TABLET | Refills: 0 | Status: SHIPPED | OUTPATIENT
Start: 2020-04-09 | End: 2020-04-16

## 2020-04-09 RX ADMIN — IPRATROPIUM BROMIDE AND ALBUTEROL SULFATE 3 ML: .5; 3 SOLUTION RESPIRATORY (INHALATION) at 11:22

## 2020-04-09 RX ADMIN — IPRATROPIUM BROMIDE AND ALBUTEROL SULFATE 3 ML: .5; 3 SOLUTION RESPIRATORY (INHALATION) at 15:14

## 2020-04-09 RX ADMIN — ASPIRIN 81 MG: 81 TABLET, DELAYED RELEASE ORAL at 08:29

## 2020-04-09 RX ADMIN — IPRATROPIUM BROMIDE AND ALBUTEROL SULFATE 3 ML: .5; 3 SOLUTION RESPIRATORY (INHALATION) at 08:18

## 2020-04-09 RX ADMIN — IPRATROPIUM BROMIDE AND ALBUTEROL 1 PUFF: 20; 100 SPRAY, METERED RESPIRATORY (INHALATION) at 06:28

## 2020-04-09 RX ADMIN — BUDESONIDE 0.5 MG: 0.5 INHALANT RESPIRATORY (INHALATION) at 07:06

## 2020-04-09 RX ADMIN — PREDNISONE 40 MG: 20 TABLET ORAL at 08:29

## 2020-04-09 ASSESSMENT — ACTIVITIES OF DAILY LIVING (ADL)
ADLS_ACUITY_SCORE: 13

## 2020-04-09 NOTE — PROGRESS NOTES
Pt is on 5L per NC with SpO2 mid 90's. Breath sounds were diminished. We had MD switch from Combivent to Duonebs due to patients SOB.  Pt. Tolerated well.  All nebs were given as ordered by MD.  RT will continue to monitor and follow.       Ermelinda Yuan, RT  4/9/2020

## 2020-04-09 NOTE — DISCHARGE SUMMARY
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  Patient reports chronic shortness of breath that gets better and worse over time.  He has cough with yellow sputum, and increased SOB with coughing.  He was given a DuoNeb.  They noted his O2 sats are in the 95% range the entire time.  There is report that when he was hooked up to monitor his heart rate was in the 180s and looked like atrial fibrillation.  However when IV was being placed his heart rate dropped into the 100s and was regular.  The emergency department he denies any chest pain.  He states he is still short of breath.  He complains of cough.  He denies any fevers or chills.  Denies diaphoresis.  He has no known sick contacts.      Initial , RR 26, WBC 11.9, lactate 2.2, plt's 93K, albumin 3.3 and Chest CT with LLL infiltrate consistent with pneumonia.     Hospital Course   Admitted to medicine floor, treated with IV fluids, Duoneb qid and albuterol neb PRN, and Prednsone 40 mg daily.  His cough and SOB improved, but still on 5 LPM O2.  And given Levaquin 750 mg IV once, then 750 mg PO daily.      While here, it became evident his limited short term memory -- he did not know the year and thought Alfred was President.  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      He has  chronic stiffness related to lack of activity.  He has been taking Colchicine 0.6 mg bid, but his uric acid was 4.4, and no evidence of active gout, and will switch Colchine to 0.6mg bid prn.     Will discharge with Home Care Nurse visit, and given his endstage COPD discussed benefit of Hospice when he is having more difficulty with SOB as he is at the limit of his O2, and at that point would benefit from PRN Morphine.  Smoking cessation encouraged, but given his dementia doubt he will follow thru on quitting.     Pj Velásquez MD, MD  Pager: [phone]  Cell Phone:  [phone]       Significant Results and Procedures   As above    Pending Results   These results will be followed up by Dr. Velásquez  Unresulted Labs Ordered in the Past 30 Days of this Admission     Date and Time Order Name Status Description    4/6/2020 1703 Blood culture Preliminary     4/6/2020 1703 Blood culture Preliminary           Code Status   DNR / DNI       Primary Care Physician   Eric Johnson    Physical Exam   Temp: 97.5  F (36.4  C) Temp src: Oral BP: 126/75 Pulse: 75 Heart Rate: 69 Resp: 20 SpO2: 96 % O2 Device: Nasal cannula Oxygen Delivery: 5 LPM  Vitals:    04/06/20 2300   Weight: 72.1 kg (158 lb 15.2 oz)     Vital Signs with Ranges  Temp:  [97.5  F (36.4  C)-97.9  F (36.6  C)] 97.5  F (36.4  C)  Pulse:  [75] 75  Heart Rate:  [69-75] 69  Resp:  [18-20] 20  BP: (115-126)/(66-75) 126/75  SpO2:  [91 %-96 %] 96 %  I/O last 3 completed shifts:  In: 1070 [P.O.:1070]  Out: 3900 [Urine:3900]    Exam on discharge:   Lungs with decreased breath sounds bilaterally  Neuro -- alert, Ox1.5    Discharge Disposition   Discharged to home with home care, and possible future hospice   Condition at discharge: Fair    Consultations This Hospital Stay   PHYSICAL THERAPY ADULT IP CONSULT  CARE TRANSITION RN/SW IP CONSULT    Time Spent on this Encounter   I spent a total of 35 minutes discharging this patient.     Discharge Orders       Home care nursing referral      Reason for your hospital stay    Pneumonia and COPD exacerbation.     Follow-up and recommended labs and tests     Follow-up with Eric Johnson in 1 week, check CBC then.     Activity    Your activity upon discharge: activity as tolerated     Discharge Instructions    Call Dr. Piper if any medical questions at Cell Phone [phone].     MD face to face encounter    Documentation of Face to Face and Certification for Home Health Services    I certify that patient: Jah Tate is under my care and that I, or a nurse practitioner or physician's assistant working with me, had a face-to-face encounter that meets the physician face-to-face encounter requirements with this patient on: 4/9/2020.    This encounter with the patient was in whole, or in part, for the following medical condition, which is the primary reason for home health care: for endstage COPD, and will benefit from future hospice involvement.    I certify that, based on my findings, the following services are medically necessary home health services: Nursing.    My clinical findings support the need for the above services because: Nurse is needed: To provide caregiver training to assist with: end stage COPD, and transition to hospice when increasing SOB (currently on 5 LPM O2, no room to increase it)..    Further, I certify that my clinical findings support that this patient is homebound (i.e. absences from home require considerable and taxing effort and are for medical reasons or [employer] services or infrequently or of short duration when for other reasons) because: Leaving home is medically contraindicated for the following reason(s): Dyspnea on exertion that makes it so they cannot leave their home for needed services without clinical deterioration...    Based on the above findings. I certify that this patient is confined to the home and needs intermittent skilled nursing care, physical therapy and/or  speech therapy.  The patient is under my care, and I have initiated the establishment of the plan of care.  This patient will be followed by a physician who will periodically review the plan of care.  Physician/Provider to provide follow up care: Eric Johnson    Attending hospital physician (the Medicare certified [facility] provider): Pj Velásquez MD  Physician Signature: See electronic signature associated with these discharge orders.  Date: 4/9/2020     DNR/DNI     Oxygen Adult/Peds    Oxygen Documentation:   I certify that this patient, Jah Tate has been under my care (or a nurse practitioner or physican's assistant working with me). This is the face-to-face encounter for oxygen medical necessity.      Jah Tate is now in a chronic stable state and continues to require supplemental oxygen. Patient has continued oxygen desaturation due to COPD J44.9.    Alternative treatment(s) tried or considered and deemed clinically infective for treatment of COPD J44.9 include nebulizers, inhalers and steroids.  If portability is ordered, is the patient mobile within the home? yes    **Patients who qualify for home O2 coverage under the CMS guidelines require ABG tests or O2 sat readings obtained closest to, but no earlier than 2 days prior to the discharge, as evidence of the need for home oxygen therapy. Testing must be performed while patient is in the chronic stable state. See notes for O2 sats.**     Diet    Follow this diet upon discharge: Orders Placed This Encounter      Regular Diet Adult     Discharge Medications   Current Discharge Medication List      START taking these medications    Details   levofloxacin (LEVAQUIN) 750 MG tablet Take 1 tablet (750 mg) by mouth daily for 5 days  Qty: 5 tablet, Refills: 0    Associated Diagnoses: Community acquired pneumonia of left lower lobe of lung (H)      predniSONE (DELTASONE) 20 MG tablet 2 pills daily for 2 days, 1 pill daily for 2 days,  then 1/2 pill daily for 2 days.  Qty: 7 tablet, Refills: 0    Associated Diagnoses: COPD exacerbation (H)         CONTINUE these medications which have CHANGED    Details   albuterol (PROAIR HFA/PROVENTIL HFA/VENTOLIN HFA) 108 (90 Base) MCG/ACT inhaler Inhale 2 puffs into the lungs every 4 hours as needed for shortness of breath / dyspnea or wheezing  Qty: 18 Inhaler, Refills: 0    Comments: Pharmacy may dispense brand covered by insurance (Proair, or proventil or ventolin or generic albuterol inhaler)  Associated Diagnoses: COPD exacerbation (H)      colchicine (COLCYRS) 0.6 MG tablet Take 1 tablet (0.6 mg) by mouth 2 times daily as needed for moderate pain TAKE 1 TABLET BY MOUTH TWICE A DAY  Qty: 60 tablet, Refills: 0    Associated Diagnoses: Encounter for medication refill      ipratropium - albuterol 0.5 mg/2.5 mg/3 mL (DUONEB) 0.5-2.5 (3) MG/3ML neb solution Take 1 vial (3 mLs) by nebulization 4 times daily  Qty: 40 vial, Refills: 3    Associated Diagnoses: COPD exacerbation (H)         CONTINUE these medications which have NOT CHANGED    Details   aspirin 81 MG tablet Take 1 tablet by mouth daily      budesonide (PULMICORT) 0.5 MG/2ML neb solution TAKE 2 MLS (0.5 MG) BY NEBULIZATION 2 TIMES DAILY  Qty: 120 mL, Refills: 2    Associated Diagnoses: Chronic obstructive pulmonary disease, unspecified COPD type (H)      docusate sodium (DULCOLAX STOOL SOFTENER) 100 MG capsule Take 2 capsules by mouth daily as needed       folic acid (FOLVITE) 1 MG tablet Take 1 tablet (1 mg) by mouth daily  Qty: 90 tablet, Refills: 3    Associated Diagnoses: Folate deficiency      vitamin D3 (CHOLECALCIFEROL) 2000 units (50 mcg) tablet Take 1 tablet by mouth daily         STOP taking these medications       guaiFENesin (MUCINEX) 600 MG 12 hr tablet Comments:   Reason for Stopping:         vitamin E 800 UNIT CAPS Comments:   Reason for Stopping:             Allergies   Allergies   Allergen Reactions     Penicillins Swelling     Sulfa  Drugs Swelling     Chantix [Varenicline Tartrate]      Hallucinations       Data   Most Recent 3 CBC's:  Recent Labs   Lab Test 04/09/20  0623 04/07/20  1057 04/06/20  1608   WBC 10.4 7.6 11.9*   HGB 12.4* 11.2* 13.0*   MCV 91 92 93   * 65* 93*      Most Recent 3 BMP's:  Recent Labs   Lab Test 04/09/20  0623 04/06/20  1608 05/21/19  1615  04/05/15  0740    139 141   < > 135   POTASSIUM 3.7 4.0 4.7   < > 4.4   CHLORIDE 102 105 98   < > 103   CO2 30 28  --   --  25   BUN 15 15 10  12   < > 12   CR 0.87 0.78 0.81   < > 0.56*   ANIONGAP 6 6  --   --  7   SHEEBA 8.8 8.6 9.5   < > 8.4*   GLC 91 107* 97   < > 185*    < > = values in this interval not displayed.     Most Recent 2 LFT's:  Recent Labs   Lab Test 04/06/20  1608 05/21/19  1615   AST 15 12   ALT 14 6   ALKPHOS 91 73   BILITOTAL 0.2 0.3     Most Recent INR's and Anticoagulation Dosing History:  Anticoagulation Dose History     There is no flowsheet data to display.        Most Recent 3 Troponin's:  Recent Labs   Lab Test 04/06/20  1608   TROPI 0.016     Most Recent Cholesterol Panel:  Recent Labs   Lab Test 07/17/12  1415   CHOL 156   LDL 90   HDL 52   TRIG 70     Most Recent 6 Bacteria Isolates From Any Culture (See EPIC Reports for Culture Details):  Recent Labs   Lab Test 04/06/20  1748   CULT No growth after 3 days  No growth after 3 days     Most Recent TSH, T4 and A1c Labs:  Recent Labs   Lab Test 04/09/20  0623   TSH 2.95

## 2020-04-09 NOTE — PLAN OF CARE
DATE & TIME: 04/08/2020 6943-2841  Cognitive Concerns/ Orientation: A&O x4  BEHAVIOR & AGGRESSION TOOL COLOR: Green, calm/cooperative  CIWA SCORE: NA  ABNL VS/O2: VSS on 5L O2  MOBILITY: SBA-Ax1 + GB & walker  PAIN MANAGMENT: Denies  DIET: Regular  BOWEL/BLADDER: Urinal at bedside, requested PRN senna. Last BM on 4/7   ABNL LAB/BG: N/A  DRAIN/DEVICES: No PIV access  TELEMETRY RHYTHM: N/A  SKIN: BLE trace edema, lashay LE.   TESTS/PROCEDURES: Negative for COVID  D/C DAY/GOALS/PLACE: Pending respiratory improvement; possible TCU.   OTHER IMPORTANT INFO: Kashia. Hx COPD. BC -. No IV access; MD aware.

## 2020-04-09 NOTE — PROGRESS NOTES
Care Coordination:    Following for discharge planning.  Per MD Pt is discharge home today with recommendations for home RN.  Met with patient and explained role.  He agrees to home care  Pt/family was provided with the Medicare Compare list for Home Care.  Discussed associated Medicare star ratings to assist with choice for referrals/discharge planning Yes.  Pt refused list.      Education was given to pt/family that star ratings are updated/maintained by Medicare and can be reviewed by visiting www.medicare.gov Yes    Noted patient has discharge orders/plans for home care including Home (RN)  Patient was given choice in selecting homecare and chose [facility]  Referral sent (4/9) and confirmed.  Provided contact information on AVS for pt to call if they have questions for   ([facility] [phone])    Discussed with wife Archana by phone and she was in agreement with this plan.  Would like CC to make recommended follow up with PCP.  She will be bringing Pt's oxygen in the car and voices they have necessary equipment at home.  Will  at Door 2 at 4pm.  Bedside nurse updated.    Apr 14, 2020  1:00 PM CDT   SHORT with Eric Johnson MD   Added to AVS.    Pt/wife voice no other needs for discharge.       Parvin Pierre RN BSN  Inpatient Care Coordination  [facility]  [phone]

## 2020-04-09 NOTE — PLAN OF CARE
Up with SBA and walker. On 5 liters oxygen via NC. Given discharged instructions and medications. Attempted to call wife at home with no answer. Pt discharged home at this time, wife is downstairs to pick pt up.

## 2020-04-09 NOTE — PLAN OF CARE
Discharge Planner PT   Patient plan for discharge: Home when medically stable  Current status:     Eval orders received, chart reviewed. Pt admitted with SOB. On 5L O2 during session. Pt demonstrates IND with bed mobility, transfers, and gait household distances. Pt demonstrates good strength, limited in activity tolerance by COPD and O2 needs. Pt reports he feels at baseline for mobility, at home pt ambulates < 50' distances at a time. No skilled PT needs indicated as pt mobilizing at baseline. Will complete orders   Barriers to return to prior living situation: none  Recommendations for discharge: home when medically stable.   Rationale for recommendations: Pt is mobilizing at baseline, no skilled PT indicated.        Entered by: Marylou Hamm 04/09/2020 9:46 AM

## 2020-04-09 NOTE — PROGRESS NOTES
[facility]  Called and spoke with patient's spouse Archana to discuss plans for home care.  Pt to be discharged home today and has agreed to have [facility] follow with Skilled Nursing services. Patient care support center processing referral.  Archana verbalized understanding that initial visit is scheduled for 4/10/20 or 4/11/20.  Pt has 24 hour phone number for [facility] for any questions or concerns.

## 2020-04-12 LAB
BACTERIA SPEC CULT: NO GROWTH
BACTERIA SPEC CULT: NO GROWTH
SPECIMEN SOURCE: NORMAL
SPECIMEN SOURCE: NORMAL

## 2020-04-15 ENCOUNTER — MEDICAL CORRESPONDENCE (OUTPATIENT)
Dept: FAMILY MEDICINE | Facility: CLINIC | Age: 75
End: 2020-04-15

## 2020-04-16 ENCOUNTER — VIRTUAL VISIT (OUTPATIENT)
Dept: FAMILY MEDICINE | Facility: CLINIC | Age: 75
End: 2020-04-16

## 2020-04-16 DIAGNOSIS — J42 CHRONIC BRONCHITIS, UNSPECIFIED CHRONIC BRONCHITIS TYPE (H): ICD-10-CM

## 2020-04-16 DIAGNOSIS — F02.80 LATE ONSET ALZHEIMER'S DISEASE WITHOUT BEHAVIORAL DISTURBANCE (H): Primary | ICD-10-CM

## 2020-04-16 DIAGNOSIS — G30.1 LATE ONSET ALZHEIMER'S DISEASE WITHOUT BEHAVIORAL DISTURBANCE (H): Primary | ICD-10-CM

## 2020-04-16 DIAGNOSIS — M1A.09X0 CHRONIC GOUT OF MULTIPLE SITES, UNSPECIFIED CAUSE: ICD-10-CM

## 2020-04-16 PROCEDURE — 99214 OFFICE O/P EST MOD 30 MIN: CPT | Mod: GT | Performed by: FAMILY MEDICINE

## 2020-04-16 RX ORDER — TIOTROPIUM BROMIDE AND OLODATEROL 3.124; 2.736 UG/1; UG/1
SPRAY, METERED RESPIRATORY (INHALATION)
COMMUNITY
Start: 2020-02-01 | End: 2020-04-16

## 2020-04-16 NOTE — PROGRESS NOTES
"  Problem(s) Oriented visit      Jah Tate is being evaluated via a billable video visit.      The patient has been notified of following:     \"This video visit will be conducted via a call between you and your physician/provider. We have found that certain health care needs can be provided without the need for an in-person physical exam.  This service lets us provide the care you need with a video conversation.  If a prescription is necessary we can send it directly to your pharmacy.  If lab work is needed we can place an order for that and you can then stop by our lab to have the test done at a later time.    If during the course of the call the physician/provider feels a video visit is not appropriate, you will not be charged for this service.\"     Physician has received verbal consent for a Video Visit from the patient? Yes    SUBJECTIVE:                                                    Subjective     Jah Tate is a 75 year old male who presents to clinic today for the following health issues:    HPI   Patient presents for Hospital Followup Visit:    Hospital:  [facility]      Date of Admission: 4/6/2020  Date of Discharge: 4/9/2020  Transitional Care Management Phone Call:   Reason(s) for Admission: SOB and getting worse            Problems taking medications regularly:  None       Medication changes since discharge: started levaquin & prednisone. 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  Taking O2 and tolerating well for a long time.  Not smoking      History of Present Illness     75 year old male who presents with shortness of breath.  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  Patient reports chronic shortness of breath that gets better and worse over time.  He has cough with yellow sputum, and increased SOB with coughing.  He was given a DuoNeb.  They noted his O2 sats are in the 95% range the entire time.  There is report that when he was hooked up to monitor his heart rate was in the 180s and looked like atrial fibrillation.  However when IV was being placed his heart rate dropped into the 100s and was regular.  The emergency department he denies any chest pain.  He states he is still short of breath.  He complains of cough.  He denies any fevers or chills.  Denies diaphoresis.  He has no known sick contacts.       Initial , RR 26, WBC 11.9, lactate 2.2, plt's 93K, albumin 3.3 and Chest CT with LLL infiltrate consistent with pneumonia.         Hospital Course     Admitted to medicine floor, treated with IV fluids, Duoneb qid and albuterol neb PRN, and Prednsone 40 mg daily.  His cough and SOB improved, but still on 5 LPM O2.  And given Levaquin 750 mg IV once, then 750 mg PO daily.       While here, it became evident his limited short term memory -- he did not know the year and thought Alfred was President.  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       He has chronic stiffness related to lack of activity.  He has been taking Colchicine 0.6 mg bid, but his uric acid was 4.4, and no evidence of active gout, and will switch Colchine to 0.6mg bid prn.      Will discharge with Home Care Nurse visit, and given his endstage COPD  discussed benefit of Hospice when he is having more difficulty with SOB as he is at the limit of his O2, and at that point would benefit from PRN Morphine.  Smoking cessation encouraged, but given his dementia doubt he will follow thru on quitting.      Pj Velásquez MD, MD  Pager: [phone]  Cell Phone:  [phone]       Diagnostic Tests/Treatments reviewed.  Follow up needed: none  Other Healthcare Providers Involved in Patient s Care:         None  Update since discharge: improved.     ROS:  Constitutional, HEENT, cardiovascular, pulmonary, gi and gu systems are negative, except as otherwise noted.    Post Discharge Medication Reconciliation: discharge medications reconciled and changed, per note/orders (see AVS).  Issues to address: No issues identified.  Plan of care communicated with patient    1. Moderate Dementia   Has known memory problems due to Alzheimers vs multi-infarct dementia.   Has been on medications for this.   Repots no side effects from these meds.   Family reports no acute changes, but still has persistent short term memory loss and impaired concentration.   They feel that he is currently not a danger to themselves and that they are in a safe situation.   The patient no longer drives.       2. Chronic bronchitis, unspecified chronic bronchitis type (H)  COPD remains stable.  Has not had any recent breathing troubles beyond usual baseline.  Has not any acute respiratory events.  Remains with intermittent cough, mild shortness of breath with overexertion as per usual.  Using medication as directed with reported side effects.      3. 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        Reviewed and updated as needed this visit by Provider         ROS:  General and CV completed and negative except as noted above          OBJECTIVE:                                                      Objective    There were no vitals taken for this visit.  Estimated body mass index is 22.17 kg/m  as calculated from the following:    Height as of 4/6/20: 1.803 m (5' 11\").    Weight as of 4/6/20: 72.1 kg (158 lb 15.2 oz).    Physical Exam   APPEARANCE: = Relaxed and in no distress  Resp effort = Calm regular breathing  Ext (edema) = No pretibial edema noted or elsewhere            ASSESSMENT/PLAN:                                                        There are no Patient Instructions on file for this visit.  Jah was seen today for hospital f/u.    Diagnoses and all orders for this visit:    Moderate Dementia     Chronic bronchitis, unspecified chronic bronchitis type (H)    Chronic gout of multiple sites, unspecified cause        Regular exercise  See Patient Instructions    Video-Visit Details    Type of service:  Video Visit  Originating Location (pt. Location): Home  Distant Location (provider location):  [facility]   Mode of Communication:  Video Conference via [place]      The following health maintenance items are reviewed in Epic and correct as of today:  Health Maintenance   Topic Date Due     ZOSTER IMMUNIZATION (1 of 2) 01/08/1993     MEDICARE ANNUAL WELLNESS VISIT  01/08/2008     LIPID  07/17/2017     FALL RISK ASSESSMENT  03/01/2019     INFLUENZA VACCINE (1) 09/01/2019     PHQ-2  01/01/2020     ADVANCE CARE " PLANNING  03/01/2023     DTAP/TDAP/TD IMMUNIZATION (2 - Td) 06/11/2026     SPIROMETRY  Completed     COPD ACTION PLAN  Completed     PNEUMOCOCCAL IMMUNIZATION 65+ LOW/MEDIUM RISK  Completed     IPV IMMUNIZATION  Aged Out     MENINGITIS IMMUNIZATION  Aged Out       Eric Johnson MD  [facility]  [facility]  [phone]    For any issues my office # is [phone]

## 2020-04-20 ENCOUNTER — MEDICAL CORRESPONDENCE (OUTPATIENT)
Dept: FAMILY MEDICINE | Facility: CLINIC | Age: 75
End: 2020-04-20

## 2020-04-30 ENCOUNTER — MEDICAL CORRESPONDENCE (OUTPATIENT)
Dept: FAMILY MEDICINE | Facility: CLINIC | Age: 75
End: 2020-04-30

## 2020-05-11 DIAGNOSIS — J44.1 COPD EXACERBATION (H): ICD-10-CM

## 2020-05-11 RX ORDER — ALBUTEROL SULFATE 90 UG/1
AEROSOL, METERED RESPIRATORY (INHALATION)
Qty: 18 INHALER | Refills: 0 | Status: SHIPPED | OUTPATIENT
Start: 2020-05-11 | End: 2020-06-02

## 2020-06-02 DIAGNOSIS — J44.1 COPD EXACERBATION (H): ICD-10-CM

## 2020-06-02 RX ORDER — ALBUTEROL SULFATE 90 UG/1
AEROSOL, METERED RESPIRATORY (INHALATION)
Qty: 18 INHALER | Refills: 0 | Status: SHIPPED | OUTPATIENT
Start: 2020-06-02 | End: 2020-06-21

## 2020-06-02 NOTE — TELEPHONE ENCOUNTER
albuterol (PROAIR HFA/PROVENTIL HFA/VENTOLIN HFA) 108 (90 Base) MCG/ACT inhaler     LOV-4/16/2020  LAST LAB-4/9/2020  Last Comprehensive Metabolic Panel:  Sodium   Date Value Ref Range Status   04/09/2020 138 133 - 144 mmol/L Final     Potassium   Date Value Ref Range Status   04/09/2020 3.7 3.4 - 5.3 mmol/L Final     Chloride   Date Value Ref Range Status   04/09/2020 102 94 - 109 mmol/L Final     Carbon Dioxide   Date Value Ref Range Status   04/09/2020 30 20 - 32 mmol/L Final     Anion Gap   Date Value Ref Range Status   04/09/2020 6 3 - 14 mmol/L Final     Glucose   Date Value Ref Range Status   04/09/2020 91 70 - 99 mg/dL Final     Urea Nitrogen   Date Value Ref Range Status   04/09/2020 15 7 - 30 mg/dL Final     Creatinine   Date Value Ref Range Status   04/09/2020 0.87 0.66 - 1.25 mg/dL Final     GFR Estimate   Date Value Ref Range Status   04/09/2020 83 >60 mL/min/[1.73_m2] Final     Comment:     Non  GFR Calc  Starting 12/18/2018, serum creatinine based estimated GFR (eGFR) will be   calculated using the Chronic Kidney Disease Epidemiology Collaboration   (CKD-EPI) equation.       Calcium   Date Value Ref Range Status   04/09/2020 8.8 8.5 - 10.1 mg/dL Final

## 2020-06-03 DIAGNOSIS — J44.9 CHRONIC OBSTRUCTIVE PULMONARY DISEASE, UNSPECIFIED COPD TYPE (H): ICD-10-CM

## 2020-06-04 RX ORDER — BUDESONIDE 0.5 MG/2ML
0.5 INHALANT ORAL 2 TIMES DAILY
Qty: 120 ML | Refills: 2 | Status: ON HOLD | OUTPATIENT
Start: 2020-06-04 | End: 2020-07-01

## 2020-06-09 ENCOUNTER — TRANSFERRED RECORDS (OUTPATIENT)
Dept: FAMILY MEDICINE | Facility: CLINIC | Age: 75
End: 2020-06-09

## 2020-06-16 ENCOUNTER — TELEPHONE (OUTPATIENT)
Dept: FAMILY MEDICINE | Facility: CLINIC | Age: 75
End: 2020-06-16

## 2020-06-16 ENCOUNTER — PATIENT OUTREACH (OUTPATIENT)
Dept: CARE COORDINATION | Facility: CLINIC | Age: 75
End: 2020-06-16

## 2020-06-16 DIAGNOSIS — J44.9 COPD (CHRONIC OBSTRUCTIVE PULMONARY DISEASE) (H): Primary | ICD-10-CM

## 2020-06-16 DIAGNOSIS — F02.80 LATE ONSET ALZHEIMER'S DISEASE WITHOUT BEHAVIORAL DISTURBANCE (H): ICD-10-CM

## 2020-06-16 DIAGNOSIS — G30.1 LATE ONSET ALZHEIMER'S DISEASE WITHOUT BEHAVIORAL DISTURBANCE (H): ICD-10-CM

## 2020-06-16 NOTE — TELEPHONE ENCOUNTER
Daughter-in-law, Luli Tate, left message stating patient's wife hospitalized 6/13 with stroke. Wife was primary caregiver for patient. Luli is asking for assistance with in-home care for Jah.   Plan: advised Luli will route message to Sonia SMALL for resources/recommendations and let know RN willing to help in any way necessary to help coordinate cares for patient.  Sharri Browning RN

## 2020-06-16 NOTE — PROGRESS NOTES
Clinic Care Coordination Contact  [facility]    Referral Source: Care Team    Clinical Data: Care Coordinator Outreaching as DIL wants to know about resources to help patient while wife is in the hospital. Spouse was main caregiver for patient who has Dementia.     Outreach attempted x 1 to Luli (Daughter in Law) as requested   Plan: Care Coordinator will try to outreach again tomorrow. Encouraged to call back     PAULINA Guzmán, [facility]  Clinic Care Coordinator  [email or web address]  [phone]

## 2020-06-18 ENCOUNTER — PATIENT OUTREACH (OUTPATIENT)
Dept: CARE COORDINATION | Facility: CLINIC | Age: 75
End: 2020-06-18

## 2020-06-18 DIAGNOSIS — F02.80 LATE ONSET ALZHEIMER'S DISEASE WITHOUT BEHAVIORAL DISTURBANCE (H): ICD-10-CM

## 2020-06-18 DIAGNOSIS — G30.1 LATE ONSET ALZHEIMER'S DISEASE WITHOUT BEHAVIORAL DISTURBANCE (H): ICD-10-CM

## 2020-06-18 DIAGNOSIS — J44.9 COPD (CHRONIC OBSTRUCTIVE PULMONARY DISEASE) (H): Primary | ICD-10-CM

## 2020-06-18 NOTE — PROGRESS NOTES
Clinic Care Coordination Contact    Referral Information:  Referral Source: Care Team    Patient's daughter in Law Luli called clinic with concerns about patient being at home without supervision/consistent help while his wife is in the hospital and may need TCU. She had a stroke. Patient has dementia and is forgetful. He is not a wander risk but needs help with bathing and medication management. Padmini/Daughter has been checking on patient daily and giving him his medications and making a meal but there is concern that they cannot get patient to bathe. He refuses. They want to look at initiating homecare and see how it goes. Patient's last clinic visit was 4/16/20 for hospital follow up for pnemonia. Patient has COPD and continues to smoke.    Primary Diagnosis: Neurological Disorders    Chief Complaint   Patient presents with     Clinic Care Coordination - Initial     Pt's wife in the hospital and may need TCU stay, patient has dementia and needs help- will need to initiate homecare         Universal Utilization:     Utilization    Last refreshed: 6/16/2020 11:28 PM:  Hospital Admissions 1           Last refreshed: 6/16/2020 11:28 PM:  ED Visits 1           Last refreshed: 6/16/2020 11:28 PM:  No Show Count (past year) 0              Current as of: 6/16/2020 11:28 PM            Clinical Concerns:  Current Medical Concerns: COPD - has albuterol. Padmini notes that she is not sure patient uses it correctly when she is away.      Current Behavioral Concerns: smokes cigarette's  Reportedly has talked with PCP before and does not want to do any interventions (nicotine patch etc). Padmini is hoping that pt's spouse being hospitalized will be a motivation to quit.    Medication Management:  Needs help to set up and administer    Functional Status:  Is able to walk without assistive device but only walks short distances. He sits in his chair at home most of the day.  Patient has been refusing to shower and needs help with bathing  this has been an ongoing issue. Sometimes he will accept a sponge bath.   Needs assistance with IADL's.    Living Situation:  Lives in a private home     Lifestyle & Psychosocial Needs:   Patient lives at home. His wife is currently in the hospital after a stroke and may need TCU stay. Discussed plan of care with daughter and DIL today via phone and agreeable to initiating homecare.     Socioeconomic History     Marital status:      Spouse name: Not on file     Number of children: Not on file     Years of education: Not on file     Highest education level: Not on file     Tobacco Use     Smoking status: Current Every Day Smoker     Packs/day: 1.00     Years: 60.00     Pack years: 60.00     Types: Cigarettes     Smokeless tobacco: Never Used   Substance and Sexual Activity     Alcohol use: No     Comment: 4-5 drinks per month     Drug use: No     Sexual activity: Yes       Advance Care Plan/Directive- on file     Referrals Placed: Home Care     Patient/Caregiver understanding: yes    Outreach Frequency: weekly      Plan: Routed update to PCP and asked to review and sign homecare order. Pt had clinic visit on 4/16/20 after hospitalization for pneumonia.  Care Coordinator will continue to follow and keep daughter/DIL updated and plan to touch base next week. [place] in case they decide to move patient and spouse to higher level of care    Sonia Bray, MSW, [facility]  Clinic Care Coordinator  [email or web address]  [phone]

## 2020-06-19 NOTE — TELEPHONE ENCOUNTER
06/19/20 9:45 AM Dr. Johnson reviewed CC SW note and fully agrees with plan. Home care order signed and sent to HC intake through [facility].   Sharri Browning RN

## 2020-06-21 DIAGNOSIS — J44.1 COPD EXACERBATION (H): ICD-10-CM

## 2020-06-21 RX ORDER — ALBUTEROL SULFATE 90 UG/1
AEROSOL, METERED RESPIRATORY (INHALATION)
Qty: 18 INHALER | Refills: 0 | Status: SHIPPED | OUTPATIENT
Start: 2020-06-21 | End: 2020-07-20

## 2020-06-23 ENCOUNTER — MEDICAL CORRESPONDENCE (OUTPATIENT)
Dept: FAMILY MEDICINE | Facility: CLINIC | Age: 75
End: 2020-06-23

## 2020-06-24 ENCOUNTER — APPOINTMENT (OUTPATIENT)
Dept: GENERAL RADIOLOGY | Facility: CLINIC | Age: 75
DRG: 190 | End: 2020-06-24
Attending: EMERGENCY MEDICINE
Payer: COMMERCIAL

## 2020-06-24 ENCOUNTER — TELEPHONE (OUTPATIENT)
Dept: FAMILY MEDICINE | Facility: CLINIC | Age: 75
End: 2020-06-24

## 2020-06-24 ENCOUNTER — HOSPITAL ENCOUNTER (INPATIENT)
Facility: CLINIC | Age: 75
LOS: 7 days | Discharge: HOME HEALTH | DRG: 190 | End: 2020-07-01
Attending: EMERGENCY MEDICINE | Admitting: INTERNAL MEDICINE
Payer: COMMERCIAL

## 2020-06-24 DIAGNOSIS — J44.1 COPD EXACERBATION (H): ICD-10-CM

## 2020-06-24 DIAGNOSIS — Z76.89 HEALTH CARE HOME: Primary | ICD-10-CM

## 2020-06-24 DIAGNOSIS — F17.200 SMOKER: ICD-10-CM

## 2020-06-24 LAB
ALBUMIN SERPL-MCNC: 3.8 G/DL (ref 3.4–5)
ALP SERPL-CCNC: 79 U/L (ref 40–150)
ALT SERPL W P-5'-P-CCNC: 16 U/L (ref 0–70)
ANION GAP SERPL CALCULATED.3IONS-SCNC: 4 MMOL/L (ref 3–14)
AST SERPL W P-5'-P-CCNC: 17 U/L (ref 0–45)
BASE EXCESS BLDV CALC-SCNC: 1.4 MMOL/L
BASOPHILS # BLD AUTO: 0 10E9/L (ref 0–0.2)
BASOPHILS # BLD AUTO: 0 10E9/L (ref 0–0.2)
BASOPHILS NFR BLD AUTO: 0.2 %
BASOPHILS NFR BLD AUTO: 0.3 %
BILIRUB SERPL-MCNC: 0.5 MG/DL (ref 0.2–1.3)
BUN SERPL-MCNC: 13 MG/DL (ref 7–30)
CALCIUM SERPL-MCNC: 8.7 MG/DL (ref 8.5–10.1)
CHLORIDE SERPL-SCNC: 105 MMOL/L (ref 94–109)
CO2 SERPL-SCNC: 28 MMOL/L (ref 20–32)
CREAT SERPL-MCNC: 0.78 MG/DL (ref 0.66–1.25)
DIFFERENTIAL METHOD BLD: ABNORMAL
DIFFERENTIAL METHOD BLD: ABNORMAL
EOSINOPHIL # BLD AUTO: 0 10E9/L (ref 0–0.7)
EOSINOPHIL # BLD AUTO: 1.2 10E9/L (ref 0–0.7)
EOSINOPHIL NFR BLD AUTO: 0.2 %
EOSINOPHIL NFR BLD AUTO: 9.8 %
ERYTHROCYTE [DISTWIDTH] IN BLOOD BY AUTOMATED COUNT: 14.4 % (ref 10–15)
ERYTHROCYTE [DISTWIDTH] IN BLOOD BY AUTOMATED COUNT: 14.4 % (ref 10–15)
FOLATE SERPL-MCNC: 60.3 NG/ML
GFR SERPL CREATININE-BSD FRML MDRD: 87 ML/MIN/{1.73_M2}
GLUCOSE SERPL-MCNC: 133 MG/DL (ref 70–99)
HCO3 BLDV-SCNC: 31 MMOL/L (ref 21–28)
HCT VFR BLD AUTO: 41.6 % (ref 40–53)
HCT VFR BLD AUTO: 42.1 % (ref 40–53)
HGB BLD-MCNC: 13.2 G/DL (ref 13.3–17.7)
HGB BLD-MCNC: 13.3 G/DL (ref 13.3–17.7)
IMM GRANULOCYTES # BLD: 0.1 10E9/L (ref 0–0.4)
IMM GRANULOCYTES # BLD: 0.2 10E9/L (ref 0–0.4)
IMM GRANULOCYTES NFR BLD: 1.5 %
IMM GRANULOCYTES NFR BLD: 1.6 %
INTERPRETATION ECG - MUSE: NORMAL
LYMPHOCYTES # BLD AUTO: 0.3 10E9/L (ref 0.8–5.3)
LYMPHOCYTES # BLD AUTO: 2.5 10E9/L (ref 0.8–5.3)
LYMPHOCYTES NFR BLD AUTO: 21.2 %
LYMPHOCYTES NFR BLD AUTO: 3.5 %
MCH RBC QN AUTO: 28.6 PG (ref 26.5–33)
MCH RBC QN AUTO: 29.2 PG (ref 26.5–33)
MCHC RBC AUTO-ENTMCNC: 31.4 G/DL (ref 31.5–36.5)
MCHC RBC AUTO-ENTMCNC: 32 G/DL (ref 31.5–36.5)
MCV RBC AUTO: 91 FL (ref 78–100)
MCV RBC AUTO: 91 FL (ref 78–100)
MONOCYTES # BLD AUTO: 0.1 10E9/L (ref 0–1.3)
MONOCYTES # BLD AUTO: 1.4 10E9/L (ref 0–1.3)
MONOCYTES NFR BLD AUTO: 1.1 %
MONOCYTES NFR BLD AUTO: 11.9 %
NEUTROPHILS # BLD AUTO: 6.6 10E9/L (ref 1.6–8.3)
NEUTROPHILS # BLD AUTO: 8.3 10E9/L (ref 1.6–8.3)
NEUTROPHILS NFR BLD AUTO: 55.2 %
NEUTROPHILS NFR BLD AUTO: 93.5 %
NRBC # BLD AUTO: 0 10*3/UL
NRBC # BLD AUTO: 0 10*3/UL
NRBC BLD AUTO-RTO: 0 /100
NRBC BLD AUTO-RTO: 0 /100
O2/TOTAL GAS SETTING VFR VENT: 50 %
PCO2 BLDV: 70 MM HG (ref 40–50)
PH BLDV: 7.25 PH (ref 7.32–7.43)
PLATELET # BLD AUTO: 83 10E9/L (ref 150–450)
PLATELET # BLD AUTO: 94 10E9/L (ref 150–450)
PO2 BLDV: 44 MM HG (ref 25–47)
POTASSIUM SERPL-SCNC: 3.8 MMOL/L (ref 3.4–5.3)
PROT SERPL-MCNC: 7.5 G/DL (ref 6.8–8.8)
RBC # BLD AUTO: 4.55 10E12/L (ref 4.4–5.9)
RBC # BLD AUTO: 4.62 10E12/L (ref 4.4–5.9)
RETICS # AUTO: 49 10E9/L (ref 25–95)
RETICS/RBC NFR AUTO: 1.1 % (ref 0.5–2)
SARS-COV-2 PCR COMMENT: NORMAL
SARS-COV-2 RNA SPEC QL NAA+PROBE: NEGATIVE
SARS-COV-2 RNA SPEC QL NAA+PROBE: NORMAL
SODIUM SERPL-SCNC: 137 MMOL/L (ref 133–144)
SPECIMEN SOURCE: NORMAL
SPECIMEN SOURCE: NORMAL
TROPONIN I SERPL-MCNC: 0.03 UG/L (ref 0–0.04)
WBC # BLD AUTO: 11.9 10E9/L (ref 4–11)
WBC # BLD AUTO: 8.8 10E9/L (ref 4–11)

## 2020-06-24 PROCEDURE — 82746 ASSAY OF FOLIC ACID SERUM: CPT | Performed by: INTERNAL MEDICINE

## 2020-06-24 PROCEDURE — 25000128 H RX IP 250 OP 636

## 2020-06-24 PROCEDURE — 94640 AIRWAY INHALATION TREATMENT: CPT | Mod: 76

## 2020-06-24 PROCEDURE — 40000847 ZZHCL STATISTIC MORPHOLOGY W/INTERP HISTOLOGY TC 85060: Performed by: INTERNAL MEDICINE

## 2020-06-24 PROCEDURE — 85045 AUTOMATED RETICULOCYTE COUNT: CPT | Performed by: INTERNAL MEDICINE

## 2020-06-24 PROCEDURE — 82803 BLOOD GASES ANY COMBINATION: CPT | Performed by: EMERGENCY MEDICINE

## 2020-06-24 PROCEDURE — 80053 COMPREHEN METABOLIC PANEL: CPT | Performed by: EMERGENCY MEDICINE

## 2020-06-24 PROCEDURE — 25000128 H RX IP 250 OP 636: Performed by: EMERGENCY MEDICINE

## 2020-06-24 PROCEDURE — U0003 INFECTIOUS AGENT DETECTION BY NUCLEIC ACID (DNA OR RNA); SEVERE ACUTE RESPIRATORY SYNDROME CORONAVIRUS 2 (SARS-COV-2) (CORONAVIRUS DISEASE [COVID-19]), AMPLIFIED PROBE TECHNIQUE, MAKING USE OF HIGH THROUGHPUT TECHNOLOGIES AS DESCRIBED BY CMS-2020-01-R: HCPCS | Performed by: EMERGENCY MEDICINE

## 2020-06-24 PROCEDURE — 93005 ELECTROCARDIOGRAM TRACING: CPT

## 2020-06-24 PROCEDURE — 96365 THER/PROPH/DIAG IV INF INIT: CPT

## 2020-06-24 PROCEDURE — 85025 COMPLETE CBC W/AUTO DIFF WBC: CPT | Performed by: INTERNAL MEDICINE

## 2020-06-24 PROCEDURE — 71045 X-RAY EXAM CHEST 1 VIEW: CPT

## 2020-06-24 PROCEDURE — 85025 COMPLETE CBC W/AUTO DIFF WBC: CPT | Performed by: EMERGENCY MEDICINE

## 2020-06-24 PROCEDURE — 27210339 ZZH CIRCUIT HUMIDITY W/CPAP BIP

## 2020-06-24 PROCEDURE — 99291 CRITICAL CARE FIRST HOUR: CPT

## 2020-06-24 PROCEDURE — 25000125 ZZHC RX 250

## 2020-06-24 PROCEDURE — 25000132 ZZH RX MED GY IP 250 OP 250 PS 637: Performed by: INTERNAL MEDICINE

## 2020-06-24 PROCEDURE — 96375 TX/PRO/DX INJ NEW DRUG ADDON: CPT

## 2020-06-24 PROCEDURE — 99223 1ST HOSP IP/OBS HIGH 75: CPT | Mod: AI | Performed by: INTERNAL MEDICINE

## 2020-06-24 PROCEDURE — 25000128 H RX IP 250 OP 636: Performed by: INTERNAL MEDICINE

## 2020-06-24 PROCEDURE — 94660 CPAP INITIATION&MGMT: CPT

## 2020-06-24 PROCEDURE — 85060 BLOOD SMEAR INTERPRETATION: CPT | Performed by: INTERNAL MEDICINE

## 2020-06-24 PROCEDURE — 36415 COLL VENOUS BLD VENIPUNCTURE: CPT | Performed by: INTERNAL MEDICINE

## 2020-06-24 PROCEDURE — 94640 AIRWAY INHALATION TREATMENT: CPT

## 2020-06-24 PROCEDURE — 25000125 ZZHC RX 250: Performed by: EMERGENCY MEDICINE

## 2020-06-24 PROCEDURE — 99292 CRITICAL CARE ADDL 30 MIN: CPT

## 2020-06-24 PROCEDURE — 25800030 ZZH RX IP 258 OP 636: Performed by: INTERNAL MEDICINE

## 2020-06-24 PROCEDURE — 40000275 ZZH STATISTIC RCP TIME EA 10 MIN

## 2020-06-24 PROCEDURE — 84484 ASSAY OF TROPONIN QUANT: CPT | Performed by: EMERGENCY MEDICINE

## 2020-06-24 PROCEDURE — 25000125 ZZHC RX 250: Performed by: INTERNAL MEDICINE

## 2020-06-24 PROCEDURE — 12000011 ZZH R&B MS OVERFLOW

## 2020-06-24 PROCEDURE — C9803 HOPD COVID-19 SPEC COLLECT: HCPCS

## 2020-06-24 RX ORDER — METHYLPREDNISOLONE SODIUM SUCCINATE 125 MG/2ML
125 INJECTION, POWDER, LYOPHILIZED, FOR SOLUTION INTRAMUSCULAR; INTRAVENOUS ONCE
Status: COMPLETED | OUTPATIENT
Start: 2020-06-24 | End: 2020-06-24

## 2020-06-24 RX ORDER — METHYLPREDNISOLONE SODIUM SUCCINATE 125 MG/2ML
INJECTION, POWDER, LYOPHILIZED, FOR SOLUTION INTRAMUSCULAR; INTRAVENOUS
Status: COMPLETED
Start: 2020-06-24 | End: 2020-06-24

## 2020-06-24 RX ORDER — IPRATROPIUM BROMIDE AND ALBUTEROL SULFATE 2.5; .5 MG/3ML; MG/3ML
3 SOLUTION RESPIRATORY (INHALATION) ONCE
Status: DISCONTINUED | OUTPATIENT
Start: 2020-06-24 | End: 2020-06-24

## 2020-06-24 RX ORDER — IPRATROPIUM BROMIDE AND ALBUTEROL SULFATE 2.5; .5 MG/3ML; MG/3ML
SOLUTION RESPIRATORY (INHALATION)
Status: COMPLETED
Start: 2020-06-24 | End: 2020-06-24

## 2020-06-24 RX ORDER — IPRATROPIUM BROMIDE AND ALBUTEROL SULFATE 2.5; .5 MG/3ML; MG/3ML
3 SOLUTION RESPIRATORY (INHALATION) ONCE
Status: COMPLETED | OUTPATIENT
Start: 2020-06-24 | End: 2020-06-24

## 2020-06-24 RX ORDER — MAGNESIUM SULFATE HEPTAHYDRATE 40 MG/ML
2 INJECTION, SOLUTION INTRAVENOUS DAILY PRN
Status: DISCONTINUED | OUTPATIENT
Start: 2020-06-24 | End: 2020-07-01 | Stop reason: HOSPADM

## 2020-06-24 RX ORDER — AZITHROMYCIN 500 MG/1
500 INJECTION, POWDER, LYOPHILIZED, FOR SOLUTION INTRAVENOUS EVERY 24 HOURS
Status: DISCONTINUED | OUTPATIENT
Start: 2020-06-24 | End: 2020-06-25

## 2020-06-24 RX ORDER — MAGNESIUM SULFATE HEPTAHYDRATE 40 MG/ML
4 INJECTION, SOLUTION INTRAVENOUS EVERY 4 HOURS PRN
Status: DISCONTINUED | OUTPATIENT
Start: 2020-06-24 | End: 2020-07-01 | Stop reason: HOSPADM

## 2020-06-24 RX ORDER — PROCHLORPERAZINE MALEATE 5 MG
5 TABLET ORAL EVERY 6 HOURS PRN
Status: DISCONTINUED | OUTPATIENT
Start: 2020-06-24 | End: 2020-07-01 | Stop reason: HOSPADM

## 2020-06-24 RX ORDER — ONDANSETRON 4 MG/1
4 TABLET, ORALLY DISINTEGRATING ORAL EVERY 6 HOURS PRN
Status: DISCONTINUED | OUTPATIENT
Start: 2020-06-24 | End: 2020-07-01 | Stop reason: HOSPADM

## 2020-06-24 RX ORDER — BUDESONIDE 0.5 MG/2ML
0.5 INHALANT ORAL 2 TIMES DAILY
Status: DISCONTINUED | OUTPATIENT
Start: 2020-06-24 | End: 2020-06-28

## 2020-06-24 RX ORDER — BISACODYL 10 MG
10 SUPPOSITORY, RECTAL RECTAL DAILY PRN
Status: DISCONTINUED | OUTPATIENT
Start: 2020-06-24 | End: 2020-07-01 | Stop reason: HOSPADM

## 2020-06-24 RX ORDER — POTASSIUM CHLORIDE 1.5 G/1.58G
20-40 POWDER, FOR SOLUTION ORAL
Status: DISCONTINUED | OUTPATIENT
Start: 2020-06-24 | End: 2020-07-01 | Stop reason: HOSPADM

## 2020-06-24 RX ORDER — ACETAMINOPHEN 325 MG/1
650 TABLET ORAL EVERY 4 HOURS PRN
Status: DISCONTINUED | OUTPATIENT
Start: 2020-06-24 | End: 2020-07-01 | Stop reason: HOSPADM

## 2020-06-24 RX ORDER — POTASSIUM CL/LIDO/0.9 % NACL 10MEQ/0.1L
10 INTRAVENOUS SOLUTION, PIGGYBACK (ML) INTRAVENOUS
Status: DISCONTINUED | OUTPATIENT
Start: 2020-06-24 | End: 2020-07-01 | Stop reason: HOSPADM

## 2020-06-24 RX ORDER — NITROGLYCERIN 0.4 MG/1
0.4 TABLET SUBLINGUAL EVERY 5 MIN PRN
Status: DISCONTINUED | OUTPATIENT
Start: 2020-06-24 | End: 2020-07-01 | Stop reason: HOSPADM

## 2020-06-24 RX ORDER — POLYETHYLENE GLYCOL 3350 17 G/17G
17 POWDER, FOR SOLUTION ORAL DAILY PRN
Status: DISCONTINUED | OUTPATIENT
Start: 2020-06-24 | End: 2020-07-01 | Stop reason: HOSPADM

## 2020-06-24 RX ORDER — POTASSIUM CHLORIDE 7.45 MG/ML
10 INJECTION INTRAVENOUS
Status: DISCONTINUED | OUTPATIENT
Start: 2020-06-24 | End: 2020-07-01 | Stop reason: HOSPADM

## 2020-06-24 RX ORDER — AMOXICILLIN 250 MG
1 CAPSULE ORAL 2 TIMES DAILY PRN
Status: DISCONTINUED | OUTPATIENT
Start: 2020-06-24 | End: 2020-07-01 | Stop reason: HOSPADM

## 2020-06-24 RX ORDER — POTASSIUM CHLORIDE 1500 MG/1
20-40 TABLET, EXTENDED RELEASE ORAL
Status: DISCONTINUED | OUTPATIENT
Start: 2020-06-24 | End: 2020-07-01 | Stop reason: HOSPADM

## 2020-06-24 RX ORDER — METHYLPREDNISOLONE SODIUM SUCCINATE 125 MG/2ML
60 INJECTION, POWDER, LYOPHILIZED, FOR SOLUTION INTRAMUSCULAR; INTRAVENOUS 2 TIMES DAILY
Status: DISCONTINUED | OUTPATIENT
Start: 2020-06-24 | End: 2020-06-25

## 2020-06-24 RX ORDER — PROCHLORPERAZINE 25 MG
12.5 SUPPOSITORY, RECTAL RECTAL EVERY 12 HOURS PRN
Status: DISCONTINUED | OUTPATIENT
Start: 2020-06-24 | End: 2020-07-01 | Stop reason: HOSPADM

## 2020-06-24 RX ORDER — POTASSIUM CHLORIDE 29.8 MG/ML
20 INJECTION INTRAVENOUS
Status: DISCONTINUED | OUTPATIENT
Start: 2020-06-24 | End: 2020-07-01 | Stop reason: HOSPADM

## 2020-06-24 RX ORDER — MAGNESIUM SULFATE HEPTAHYDRATE 40 MG/ML
2 INJECTION, SOLUTION INTRAVENOUS ONCE
Status: COMPLETED | OUTPATIENT
Start: 2020-06-24 | End: 2020-06-24

## 2020-06-24 RX ORDER — IPRATROPIUM BROMIDE AND ALBUTEROL SULFATE 2.5; .5 MG/3ML; MG/3ML
1 SOLUTION RESPIRATORY (INHALATION)
Status: DISCONTINUED | OUTPATIENT
Start: 2020-06-24 | End: 2020-06-26

## 2020-06-24 RX ORDER — ONDANSETRON 2 MG/ML
4 INJECTION INTRAMUSCULAR; INTRAVENOUS EVERY 6 HOURS PRN
Status: DISCONTINUED | OUTPATIENT
Start: 2020-06-24 | End: 2020-07-01 | Stop reason: HOSPADM

## 2020-06-24 RX ORDER — CARBOXYMETHYLCELLULOSE SODIUM 5 MG/ML
1 SOLUTION/ DROPS OPHTHALMIC
Status: DISCONTINUED | OUTPATIENT
Start: 2020-06-24 | End: 2020-07-01 | Stop reason: HOSPADM

## 2020-06-24 RX ORDER — NALOXONE HYDROCHLORIDE 0.4 MG/ML
.1-.4 INJECTION, SOLUTION INTRAMUSCULAR; INTRAVENOUS; SUBCUTANEOUS
Status: DISCONTINUED | OUTPATIENT
Start: 2020-06-24 | End: 2020-07-01 | Stop reason: HOSPADM

## 2020-06-24 RX ORDER — ALBUTEROL SULFATE 0.83 MG/ML
3 SOLUTION RESPIRATORY (INHALATION)
Status: DISCONTINUED | OUTPATIENT
Start: 2020-06-24 | End: 2020-07-01 | Stop reason: HOSPADM

## 2020-06-24 RX ORDER — SODIUM CHLORIDE, SODIUM LACTATE, POTASSIUM CHLORIDE, CALCIUM CHLORIDE 600; 310; 30; 20 MG/100ML; MG/100ML; MG/100ML; MG/100ML
INJECTION, SOLUTION INTRAVENOUS CONTINUOUS
Status: DISCONTINUED | OUTPATIENT
Start: 2020-06-24 | End: 2020-06-25

## 2020-06-24 RX ORDER — LIDOCAINE 40 MG/G
CREAM TOPICAL
Status: DISCONTINUED | OUTPATIENT
Start: 2020-06-24 | End: 2020-06-27

## 2020-06-24 RX ORDER — LIDOCAINE 40 MG/G
CREAM TOPICAL
Status: DISCONTINUED | OUTPATIENT
Start: 2020-06-24 | End: 2020-07-01 | Stop reason: HOSPADM

## 2020-06-24 RX ORDER — ACETAMINOPHEN 650 MG/1
650 SUPPOSITORY RECTAL EVERY 4 HOURS PRN
Status: DISCONTINUED | OUTPATIENT
Start: 2020-06-24 | End: 2020-07-01 | Stop reason: HOSPADM

## 2020-06-24 RX ORDER — ASPIRIN 81 MG/1
81 TABLET ORAL DAILY
Status: DISCONTINUED | OUTPATIENT
Start: 2020-06-25 | End: 2020-07-01 | Stop reason: HOSPADM

## 2020-06-24 RX ORDER — NICOTINE 21 MG/24HR
1 PATCH, TRANSDERMAL 24 HOURS TRANSDERMAL DAILY
Status: DISCONTINUED | OUTPATIENT
Start: 2020-06-24 | End: 2020-07-01 | Stop reason: HOSPADM

## 2020-06-24 RX ORDER — AMOXICILLIN 250 MG
2 CAPSULE ORAL 2 TIMES DAILY PRN
Status: DISCONTINUED | OUTPATIENT
Start: 2020-06-24 | End: 2020-07-01 | Stop reason: HOSPADM

## 2020-06-24 RX ADMIN — IPRATROPIUM BROMIDE AND ALBUTEROL SULFATE 3 ML: .5; 3 SOLUTION RESPIRATORY (INHALATION) at 15:44

## 2020-06-24 RX ADMIN — IPRATROPIUM BROMIDE AND ALBUTEROL SULFATE 3 ML: .5; 3 SOLUTION RESPIRATORY (INHALATION) at 05:42

## 2020-06-24 RX ADMIN — METHYLPREDNISOLONE SODIUM SUCCINATE 62.5 MG: 125 INJECTION, POWDER, FOR SOLUTION INTRAMUSCULAR; INTRAVENOUS at 20:41

## 2020-06-24 RX ADMIN — SODIUM CHLORIDE, POTASSIUM CHLORIDE, SODIUM LACTATE AND CALCIUM CHLORIDE: 600; 310; 30; 20 INJECTION, SOLUTION INTRAVENOUS at 13:33

## 2020-06-24 RX ADMIN — IPRATROPIUM BROMIDE AND ALBUTEROL SULFATE 3 ML: .5; 3 SOLUTION RESPIRATORY (INHALATION) at 11:24

## 2020-06-24 RX ADMIN — IPRATROPIUM BROMIDE AND ALBUTEROL SULFATE 3 ML: 2.5; .5 SOLUTION RESPIRATORY (INHALATION) at 05:42

## 2020-06-24 RX ADMIN — ENOXAPARIN SODIUM 40 MG: 40 INJECTION SUBCUTANEOUS at 12:02

## 2020-06-24 RX ADMIN — METHYLPREDNISOLONE SODIUM SUCCINATE 125 MG: 125 INJECTION INTRAMUSCULAR; INTRAVENOUS at 05:42

## 2020-06-24 RX ADMIN — MAGNESIUM SULFATE IN WATER 2 G: 40 INJECTION, SOLUTION INTRAVENOUS at 06:16

## 2020-06-24 RX ADMIN — AZITHROMYCIN MONOHYDRATE 500 MG: 500 INJECTION, POWDER, LYOPHILIZED, FOR SOLUTION INTRAVENOUS at 12:03

## 2020-06-24 RX ADMIN — SODIUM CHLORIDE, POTASSIUM CHLORIDE, SODIUM LACTATE AND CALCIUM CHLORIDE: 600; 310; 30; 20 INJECTION, SOLUTION INTRAVENOUS at 11:24

## 2020-06-24 RX ADMIN — IPRATROPIUM BROMIDE AND ALBUTEROL SULFATE 3 ML: .5; 3 SOLUTION RESPIRATORY (INHALATION) at 06:04

## 2020-06-24 RX ADMIN — BUDESONIDE 0.5 MG: 0.5 INHALANT RESPIRATORY (INHALATION) at 13:50

## 2020-06-24 RX ADMIN — IPRATROPIUM BROMIDE AND ALBUTEROL SULFATE 3 ML: 2.5; .5 SOLUTION RESPIRATORY (INHALATION) at 06:04

## 2020-06-24 RX ADMIN — NICOTINE 1 PATCH: 21 PATCH, EXTENDED RELEASE TRANSDERMAL at 11:22

## 2020-06-24 RX ADMIN — METHYLPREDNISOLONE SODIUM SUCCINATE 125 MG: 125 INJECTION, POWDER, FOR SOLUTION INTRAMUSCULAR; INTRAVENOUS at 05:42

## 2020-06-24 RX ADMIN — IPRATROPIUM BROMIDE AND ALBUTEROL SULFATE 3 ML: .5; 3 SOLUTION RESPIRATORY (INHALATION) at 05:59

## 2020-06-24 ASSESSMENT — ACTIVITIES OF DAILY LIVING (ADL)
ADLS_ACUITY_SCORE: 12
ADLS_ACUITY_SCORE: 13
ADLS_ACUITY_SCORE: 13

## 2020-06-24 ASSESSMENT — ENCOUNTER SYMPTOMS
VOMITING: 0
DIARRHEA: 0
SHORTNESS OF BREATH: 1
WHEEZING: 1
ABDOMINAL PAIN: 0
COUGH: 0
FEVER: 0

## 2020-06-24 NOTE — PHARMACY-ADMISSION MEDICATION HISTORY
Pharmacy Medication History  Admission medication history interview status for the 6/24/2020  admission is complete. See EPIC admission navigator for prior to admission medications     Medication history sources: Patient's family/friend (daughter)  Medication history source reliability: Good  Adherence assessment: Moderate    Significant changes made to the medication list:  none      Additional medication history information:   none    Medication reconciliation completed by provider prior to medication history? No    Time spent in this activity: 10 min      Prior to Admission medications    Medication Sig Last Dose Taking? Auth Provider   albuterol (PROAIR HFA/PROVENTIL HFA/VENTOLIN HFA) 108 (90 Base) MCG/ACT inhaler INHALE 2 PUFFS BY MOUTH EVERY 6 HOURS AS NEEDED FOR SHORTNESS OF BREATH OR WHEEZING.  Yes Eric Johnson MD   aspirin 81 MG tablet Take 1 tablet by mouth daily 6/22/2020 at Unknown time Yes Reported, Patient   budesonide (PULMICORT) 0.5 MG/2ML neb solution Take 2 mLs (0.5 mg) by nebulization 2 times daily  Yes Eric Johnson MD   colchicine (COLCYRS) 0.6 MG tablet Take 1 tablet (0.6 mg) by mouth 2 times daily as needed for moderate pain TAKE 1 TABLET BY MOUTH TWICE A DAY 6/23/2020 at Unknown time Yes Pj Piper MD   docusate sodium (DULCOLAX STOOL SOFTENER) 100 MG capsule Take 2 capsules by mouth daily as needed  6/22/2020 Yes Reported, Patient   folic acid (FOLVITE) 1 MG tablet Take 1 tablet (1 mg) by mouth daily 6/23/2020 Yes Eric Johnson MD   ipratropium - albuterol 0.5 mg/2.5 mg/3 mL (DUONEB) 0.5-2.5 (3) MG/3ML neb solution Take 1 vial (3 mLs) by nebulization 4 times daily 6/23/2020 at Unknown time Yes Pj Piper MD   vitamin D3 (CHOLECALCIFEROL) 2000 units (50 mcg) tablet Take 1 tablet by mouth daily 6/23/2020 at Unknown time Yes Reported, Patient

## 2020-06-24 NOTE — ED NOTES
[facility]  ED Nurse Handoff Report    ED Chief complaint: Shortness of Breath (pt biba from home - woke up 45 min ago unable to breathe - pt was on 5L o2 NC, pt's O2 sat 68%  - given neb and cpap which brought sat up to 98%. )      ED Diagnosis:   Final diagnoses:   None       Code Status: hospitalist to address    Allergies:   Allergies   Allergen Reactions     Penicillins Swelling     Sulfa Drugs Swelling     Chantix [Varenicline Tartrate]      Hallucinations         Patient Story: patient brought into ED via EMS with reports of waking up suddenly SOB  Focused Assessment:  Patient was 68% on 5L nasal cannula for EMS, pt given 1 duoneb and placed on cpap. Patient labored on arrival to ED. Pt placed on BiPap in ED, and given duonebs, magnesium and solu medrol. Patient not resting more comfortable. Heart rate down and BP down from arrival. Patient has productive cough X 2 weeks, aferile. Hx of COPD.     Treatments and/or interventions provided: see MAR, pt on BiPap at 30% FiO2  Patient's response to treatments and/or interventions: respirations less labored     To be done/followed up on inpatient unit:  none at this time    Does this patient have any cognitive concerns?: N/A    Activity level - Baseline/Home:  Independent  Activity Level - Current:   Stand with assist x2    Patient's Preferred language: English   Needed?: No    Isolation: None and Other: COVID rule out  Infection: COVID rule out    Bariatric?: No    Vital Signs:   Vitals:    06/24/20 0623 06/24/20 0630 06/24/20 0640 06/24/20 0645   BP:  139/74  130/69   Pulse:  105  104   Resp: 27  18 21   Temp:       TempSrc:       SpO2: 93% 95% 96% 95%   Weight:           Cardiac Rhythm:     Was the PSS-3 completed:   Yes  What interventions are required if any?               Family Comments: RN contacted pt's daughter Padmini- see previous note  OBS brochure/video discussed/provided to patient/family: N/A              Name of person given  brochure if not patient:               Relationship to patient:     For the majority of the shift this patient's behavior was Green.   Behavioral interventions performed were .    ED NURSE PHONE NUMBER: [phone]

## 2020-06-24 NOTE — ED NOTES
Bed: ST03  Expected date:   Expected time:   Means of arrival:   Comments:  451  75M COPD/CPAP  0582

## 2020-06-24 NOTE — ED PROVIDER NOTES
History   Chief Complaint:  Shortness of Breath    HPI   Jah Tate is a 75 year old male, with a history of COPD, emphysema, and dementia, who presents to the ED for evaluation of shortness of breath. EMS reports the patient went to bed last night feeling okay, but then suddenly woke up about 45 minutes okay feeling short of breath and having a difficult time breathing. When they arrived on scene the patient was on 5L (which is chronic), but satting at 68%. En route, they gave him a DuoNeb, an albuterol neb, and was placed on CPAP, which brought his oxygen saturation to 98%.     Allergies:  Penicillins  Sulfa drugs  Chantix    Medications:    Albuterol  Asprin  Pulmicort    Past Medical History:    COPD  Dementia  Emphysema  AAA  Idiopathic gout    Past Surgical History:    ORIF let ankle fracture    Family History:    History reviewed. No pertinent family history.     Social History:  Smoking status: Yes  Alcohol use: No  Drug use: No  PCP: Eric Johnson  Marital Status:   [2]    Review of Systems   Constitutional: Negative for fever.   Respiratory: Positive for shortness of breath and wheezing. Negative for cough.    Cardiovascular: Negative for chest pain.   Gastrointestinal: Negative for abdominal pain, diarrhea and vomiting.   All other systems reviewed and are negative.        Physical Exam     Patient Vitals for the past 24 hrs:   BP Temp Temp src Pulse Heart Rate Resp SpO2 Weight   06/24/20 0645 130/69 -- -- 104 102 21 95 % --   06/24/20 0640 -- -- -- -- 101 18 96 % --   06/24/20 0630 139/74 -- -- 105 109 -- 95 % --   06/24/20 0623 -- -- -- -- 112 27 93 % --   06/24/20 0620 (!) 158/75 -- -- -- 108 18 96 % --   06/24/20 0615 (!) 158/75 -- -- 109 112 (!) 32 95 % --   06/24/20 0600 (!) 173/96 -- -- 111 112 30 99 % --   06/24/20 0554 -- 98.4  F (36.9  C) Temporal -- -- -- -- --   06/24/20 0545 (!) 137/93 -- -- 117 116 26 98 % --   06/24/20 0540 (!) 196/99 -- -- 113 -- 28 96 % 78.5 kg (173  lb 1 oz)       Physical Exam  General: Appears well-developed and well-nourished.   Head: No signs of trauma.   CV: Mild tachycardia and regular rhythm.    Resp: Decreased air movement with wheezing bilaterally.  Speaking in short phrases.  Appears in moderate distress.  GI: Soft. There is no tenderness.  No rebound or guarding.  Normal bowel sounds.   MSK: Normal range of motion. no edema. No Calf tenderness.  Neuro: The patient is alert and oriented. Speech normal.  Skin: Skin is warm and dry. No rash noted.   Psych: normal mood and affect. behavior is normal.       Emergency Department Course   ECG (05:42:37):  Rate 118 bpm. NY interval 132. QRS duration 90. QT/QTc 336/470. P-R-T axes 77 96 74. Sinus tachycardia. Rightward axis. Nonspecific ST abnormality. Abnormal ECG. Interpreted at 0542 by Jaylon Cruz MD.    Imaging:  Radiology findings were communicated with the patient who voiced understanding of the findings.    XR Chest, portable, 1 view:  Normal heart size and pulmonary vascularity. No acute infiltrates or consolidation. Blunting of the costophrenic angles, left greater than right, suggestive of small pleural effusions. Aortic calcification. No significant bony abnormalities.   Remainder unremarkable.     Imaging independently reviewed and agree with radiologist interpretation.      Laboratory:  Laboratory findings were communicated with the patient who voiced understanding of the findings.    CBC: WBC 11.9 (H), PLT 94 (L), o/w WNL (HGB 13.3)    CMP:  (H), o/w WNL (Creatinine 0.78)    Troponin: 0.027    Blood gas venous: pH Venous 7.25 (L), PCO2 Venous 70 (H), PO2 Venous 44, Bicarbonate 31 (H), Base Excess 1.4      COVID-19 Virus PCR: Pending    Interventions:  0542: Solu-Medrol 125 mg IV  0542: Duoneb 3 mL nebulization  0559: Duoneb 3 mL nebulization   0604: Duoneb 3 mL nebulization   0616: Magnesium sulfate 2 g in NS IV Infusion       Emergency Department Course:  Past medical records,  nursing notes, and vitals reviewed.    0539 I performed an exam of the patient as documented above.     EKG obtained in the ED, see results above.   IV was inserted and blood was drawn for laboratory testing, results above.  The patient was sent for a chest x-ray while in the emergency department, results above.    0545 I rechecked the patient.    0550 I rechecked the patient. The patient will receive more DuoNeb's.    0558 I rechecked the patient. The patient's work of breathing has improved.     0630 I rechecked the patient and discussed the results of his workup thus far.     Findings and plan explained to the Patient who consents to admission. Discussed the patient with Dr. Resendez, who will admit the patient to a medical bed for further monitoring, evaluation, and treatment.    I personally reviewed the laboratory and imaging results with the Patient and answered all related questions prior to admission.    Impression & Plan   Covid-19  Jah Tate was evaluated during a global COVID-19 pandemic, which necessitated consideration that the patient might be at risk for infection with the SARS-CoV-2 virus that causes COVID-19.   Applicable protocols for evaluation were followed during the patient's care.   COVID-19 was considered as part of the patient's evaluation. The plan for testing is:  a test was obtained during this visit.    Medical Decision Making:  Jah Tate is a 75-year-old gentleman who presents with shortness of breath. He had been feeling well yesterday, but apparently woke up feeling quite short of breath and called EMS.  He was hypoxic despite his home oxygen but apparently improved with breathing treatment and CPAP in route.  On my initial evaluation he did have decreased air movement.  He was given multiple DuoNebs and was placed on BiPAP and given Solu-Medrol.  Over time his respiratory status did improve.  Patient does continue to smoke and his symptoms would fit with a COPD  exacerbation.  Chest x-ray did not show any signs of infiltrate and clinical have a lower suspicion for infection given what seems to be a fairly rapid onset of symptoms.  Patient will be admitted to the hospitalist service for continued monitoring and treatment.    Critical Care Time: was 30 minutes for this patient excluding procedures    Diagnosis:    ICD-10-CM    1. COPD exacerbation (H)  J44.1        Disposition:  Admitted to a medical bed.    Scribe Disclosure:  Chuck JONAS, am serving as a scribe at 5:39 AM on 6/24/2020 to document services personally performed by Jaylon Cruz MD based on my observations and the provider's statements to me.        Jaylon Cruz MD  06/24/20 1924

## 2020-06-24 NOTE — PROGRESS NOTES
Pt placed on BiPAP per order, sats in the high 90's.   CPAP/BiPAP Settings  IPAP: 12  EPAP: 5  Rate: 12  FiO2: 30%  Timed Inspiration (sec): .9    CPAP/BiPAP/AVAPS/AVAPS AE Alarms  High Pressure: 25  Low Pressure: 5  Low Pressure Delay:   High Rate (breaths/min): 45  Low Rate (breaths/min): 5  Alarm Volume Level: 10    CPAP/BiPAP/AVAPS/AVAPS AE Patient Assessment  Skin Assessment: skin intact   Barriers Applied: gelpad and mepilex in place     Plan:  RT continue to monitor.

## 2020-06-24 NOTE — ED NOTES
Updated daughter Padmini at this time - daughter was told to expect pt to be admitted into the hospital today - would like a further update once all the results are back and when pt has a bed. Daughter can be reached at [phone]

## 2020-06-24 NOTE — ED NOTES
Patient's work of breathing becoming more labored and increased. ER MD notified, order received for duo-neb X 2 through BiPap

## 2020-06-24 NOTE — H&P
[facility]    History and Physical - Hospitalist Service       Date of Admission:  6/24/2020    Assessment & Plan   Jah Tate is a 75 year-old male with history of oxygen dependent COPD, dementia, gout, chronic lower extremity edema who presents with shortness of breath. Admitted on 6/24/2020 for COPD exacerbation.     COPD exacerbation  Acute hypoxic and hypercarbic respiratory failure  Presents with acute onset of shortness of breath the morning of admission. Oxygen saturations reportedly in the 60s on 5L nasal cannula (baseline oxygen) on EMS arrival. CXR unremarkable, mild WBC elevation. Denies sick contacts, fevers/chills. Has had a cough productive of yellow sputum for the past few weeks. Prior to admission on budesonide nebulizer BID, scheduled Duo-Nebs four times daily. Exacerbation likely triggered by ongoing tobacco use. Denies any chest pain, symptoms currently improved with COPD directed therapy, lower suspicion for PE.  - Continue BiPAP, wean as tolerated  - Admit to IMC until stable off of BiPAP  - Continue IV methylprednisolone BID, transition to prednisone daily once off BiPAP   - Scheduled Duo-Nebs every 4 hours while awake and PRN albuterol   - RCAT  - Continue prior to admission budesonide  - Azithromycin IV    COVID19 status  Low suspicion; no known sick contacts, has been isolating in home, afebrile, no infiltrate on CXR, presentation consistent with COPD exacerbation (alternate explanation for symptoms).  - COVID19 PCR in process    Jah is a LOW SUSPICION PUI.  Follow these instructions:    If COVID test positive -> continue isolation precautions    If COVID test negative -> discontinue COVID-specific isolation precautions    Chronic thrombocytopenia  New over past 1 year. Platelet count stable from past admission. TSH, vitamin B12 normal last admission.   - Peripheral smear  - Check folate   - Repeat CBC in AM     Gout  Prior to admission on colchicine PRN. No  signs of flare at this time.     Chronic bilateral lower extremity edema  Dependent edema, consistent with venous stasis.   - Elevate legs    Dementia  Limited metabolic work-up negative last admission (TSH, vitamin B12). Currently alert and oriented except for year.   - Re-orient as needed  - Maintain normal day/night, sleep wake cycles  - Minimize sedating/altering medications as able  - Treat separate conditions as detailed above/below     Tobacco dependence  - Nicotine patch ordered     Diet: NPO while on BiPAP, regular diet otherwise  DVT Prophylaxis: Enoxaparin (Lovenox) SQ  Cope Catheter: not present  Code Status: Full code     Disposition Plan   Expected discharge:  Admit [place] [facility] [place]. Anticipate greater than or equal to 2 midnights prior to discharge.    Entered: Jalil Raza MD 06/24/2020, 7:53 AM     The patient's care was discussed with the Patient and Patient's daughter.    Jalil Raza MD  [facility]    ______________________________________________________________________    Chief Complaint   Shortness of breath for 1 day    History of Present Illness   Jah Tate is a 75 year old male who presents with the above chief complaint.    History is obtained from the patient and confirmed with patient's daughter.  Patient reports he went to bed in his usual state of health the night prior to admission.  He awoke around 3 in the morning feeling acutely short of breath.  He has a history of COPD and is chronically maintained on 5 L of oxygen.  He continues to smoke half pack per day.  He has had a cough productive of yellow sputum for the past few weeks.  He was hospitalized in early April 2020 for COPD exacerbation with concurrent pneumonia, treated with antibiotics and steroids.  His wife is currently hospitalized for stroke, has been hospitalized for about a week and during that time his daughter has been staying with him.  He has been isolating in his home  due to COVID19, other than his daughter has not had any outside contacts.  He denies any fevers or chills.  Denies any chest pain.  He has been mostly compliant with his home nebulizers, though misses an occasional dose when he forgets.    In the Emergency Department, the patient was seen by Dr. Jaylon Cruz, with whom I discussed the patient's presenting symptoms and emergency department course.  Initial vital signs were a temperature of 98.4F, , /99, RR 28, SpO2 96% on BiPAP. Work-up included CXR which was negative for infiltrate, VBG of 7.25/70/-/-. The patient received IV methylprednisolone and Duo-Nebs. Hospitalists were contacted for admission to the hospital.     Review of Systems    Complete 10 point review of systems assessed and negative except as noted in HPI.    Past Medical History    I have reviewed this patient's medical history and updated it with pertinent information if needed.   Past Medical History:   Diagnosis Date     Bilateral lower extremity edema      COPD (chronic obstructive pulmonary disease) (H)      Dementia (H)      Gout        Past Surgical History   I have reviewed this patient's surgical history and updated it with pertinent information if needed.  Past Surgical History:   Procedure Laterality Date     FRACTURE TX, ANKLE RT/LT      left- w/plate       Social History   Current 0.5 ppd smoker.  No illicit or IV drug use    Lives with his wife, who is currently hospitalized. Has a son and daughter, daughter has been living with him while wife is hospitalized.     Family History   Reviewed and non-contributory to chief complaint. Denies any family history of lung disease.     Prior to Admission Medications   Prior to Admission Medications   Prescriptions Last Dose Informant Patient Reported? Taking?   albuterol (PROAIR HFA/PROVENTIL HFA/VENTOLIN HFA) 108 (90 Base) MCG/ACT inhaler   No No   Sig: INHALE 2 PUFFS BY MOUTH EVERY 6 HOURS AS NEEDED FOR SHORTNESS OF BREATH OR  WHEEZING.   aspirin 81 MG tablet  Self Yes No   Sig: Take 1 tablet by mouth daily   budesonide (PULMICORT) 0.5 MG/2ML neb solution   No No   Sig: Take 2 mLs (0.5 mg) by nebulization 2 times daily   colchicine (COLCYRS) 0.6 MG tablet   No No   Sig: Take 1 tablet (0.6 mg) by mouth 2 times daily as needed for moderate pain TAKE 1 TABLET BY MOUTH TWICE A DAY   docusate sodium (DULCOLAX STOOL SOFTENER) 100 MG capsule  Self Yes No   Sig: Take 2 capsules by mouth daily as needed    folic acid (FOLVITE) 1 MG tablet   No No   Sig: Take 1 tablet (1 mg) by mouth daily   ipratropium - albuterol 0.5 mg/2.5 mg/3 mL (DUONEB) 0.5-2.5 (3) MG/3ML neb solution   No No   Sig: Take 1 vial (3 mLs) by nebulization 4 times daily   vitamin D3 (CHOLECALCIFEROL) 2000 units (50 mcg) tablet   Yes No   Sig: Take 1 tablet by mouth daily      Facility-Administered Medications: None     Allergies   Allergies   Allergen Reactions     Penicillins Swelling     Sulfa Drugs Swelling     Chantix [Varenicline Tartrate]      Hallucinations         Physical Exam   Vital Signs: Temp: 98.4  F (36.9  C) Temp src: Temporal BP: 120/70 Pulse: 100 Heart Rate: 100 Resp: 10 SpO2: 95 % O2 Device: BiPAP/CPAP    Weight: 173 lbs .98 oz    Constitutional: NAD  Eyes: PERRL, EOMI  HENT: BiPAP mask in place  Respiratory: Diminished air movement throughout without discrete crackles or wheezes, breathing comfortably on BiPAP, no distress.   Cardiovascular: Regular rhythm with slightly tachycardic rate. 1+ bilateral lower extremity edema.   GI: Soft, non-tender, non-distended. No rebound tenderness or guarding.    Skin: Warm, dry   Neurologic: Alert. Responding to questions appropriately. Following commands. Oriented to person, place and time with exception of year  Psychiatric: Normal affect, appropriate      Data   Data reviewed today: I reviewed all medications, new labs and imaging results over the last 24 hours. I personally reviewed the EKG tracing showing sinus  tachycardia .    Recent Labs   Lab 06/24/20  0543   WBC 11.9*   HGB 13.3   MCV 91   PLT 94*      POTASSIUM 3.8   CHLORIDE 105   CO2 28   BUN 13   CR 0.78   ANIONGAP 4   SHEEBA 8.7   *   ALBUMIN 3.8   PROTTOTAL 7.5   BILITOTAL 0.5   ALKPHOS 79   ALT 16   AST 17   TROPI 0.027       Recent Results (from the past 24 hour(s))   XR Chest Port 1 View    Narrative    EXAM: XR CHEST PORTABLE 1 VIEW  LOCATION: [facility]  DATE/TIME: 06/24/2020, 6:00 AM    INDICATION: Shortness of breath.  COMPARISON: 04/06/2020.      Impression    IMPRESSION: Normal heart size and pulmonary vascularity. No acute infiltrates or consolidation. Blunting of the costophrenic angles, left greater than right, suggestive of small pleural effusions. Aortic calcification. No significant bony abnormalities.   Remainder unremarkable.

## 2020-06-24 NOTE — TELEPHONE ENCOUNTER
Luli, daughter in law, calls reporting patient is in hospital today for COPD flare and possibly coming home tomorrow.   Padmini Rockwell (daughter) and Ross (son) are working to care for patient in home while wife in rehab s/p stroke. Luli reports patient smokes 1-1.5 ppd and ran out of cigarettes yesterday. On continuous oxygen in home.   Per Luli, they plan to not purchase more cigarettes for patient and interested in Nicotrol Inhaler to help him with smoking cessation. Would like rx sent to pharmacy if appropriate.

## 2020-06-24 NOTE — PROGRESS NOTES
Patient is A/O, with forgetfulness, patient has dementia hence short term memory loss, up to the bathroom with SBA and G/B, patient gets dyspneic on exertion, on oxygen at 3 L NC in mid 90s, usually at 5 L at home baseline, h/o COPD and emphysema, patient is negative for covid-19, voiding per urinal adequately, passing gas, no BM this shift.

## 2020-06-24 NOTE — PROGRESS NOTES
Update:     Patient improving, stable off of BiPAP. Okay to discontinue IMC status. COVID19 PCR negative, precautions discontinued.     Jalil Raza MD   Hospitalist  [phone]

## 2020-06-24 NOTE — ED TRIAGE NOTES
pt biba from home - woke up 45 min ago unable to breathe - pt was on 5L o2 NC, pt's O2 sat 68% - given neb and cpap which brought sat up to 98%.

## 2020-06-25 ENCOUNTER — MEDICAL CORRESPONDENCE (OUTPATIENT)
Dept: FAMILY MEDICINE | Facility: CLINIC | Age: 75
End: 2020-06-25

## 2020-06-25 ENCOUNTER — PATIENT OUTREACH (OUTPATIENT)
Dept: CARE COORDINATION | Facility: CLINIC | Age: 75
End: 2020-06-25

## 2020-06-25 LAB
ANION GAP SERPL CALCULATED.3IONS-SCNC: 4 MMOL/L (ref 3–14)
BUN SERPL-MCNC: 12 MG/DL (ref 7–30)
CALCIUM SERPL-MCNC: 8.7 MG/DL (ref 8.5–10.1)
CHLORIDE SERPL-SCNC: 104 MMOL/L (ref 94–109)
CO2 SERPL-SCNC: 31 MMOL/L (ref 20–32)
COPATH REPORT: NORMAL
CREAT SERPL-MCNC: 0.73 MG/DL (ref 0.66–1.25)
ERYTHROCYTE [DISTWIDTH] IN BLOOD BY AUTOMATED COUNT: 14.1 % (ref 10–15)
GFR SERPL CREATININE-BSD FRML MDRD: 89 ML/MIN/{1.73_M2}
GLUCOSE SERPL-MCNC: 147 MG/DL (ref 70–99)
GRAM STN SPEC: ABNORMAL
HCT VFR BLD AUTO: 36.5 % (ref 40–53)
HGB BLD-MCNC: 11.8 G/DL (ref 13.3–17.7)
MCH RBC QN AUTO: 29.1 PG (ref 26.5–33)
MCHC RBC AUTO-ENTMCNC: 32.3 G/DL (ref 31.5–36.5)
MCV RBC AUTO: 90 FL (ref 78–100)
PLATELET # BLD AUTO: 104 10E9/L (ref 150–450)
POTASSIUM SERPL-SCNC: 4 MMOL/L (ref 3.4–5.3)
RBC # BLD AUTO: 4.06 10E12/L (ref 4.4–5.9)
SODIUM SERPL-SCNC: 139 MMOL/L (ref 133–144)
SPECIMEN SOURCE: ABNORMAL
WBC # BLD AUTO: 8 10E9/L (ref 4–11)

## 2020-06-25 PROCEDURE — 25000128 H RX IP 250 OP 636: Performed by: INTERNAL MEDICINE

## 2020-06-25 PROCEDURE — 12000000 ZZH R&B MED SURG/OB

## 2020-06-25 PROCEDURE — 85027 COMPLETE CBC AUTOMATED: CPT | Performed by: INTERNAL MEDICINE

## 2020-06-25 PROCEDURE — 25000132 ZZH RX MED GY IP 250 OP 250 PS 637: Performed by: INTERNAL MEDICINE

## 2020-06-25 PROCEDURE — 25800030 ZZH RX IP 258 OP 636: Performed by: INTERNAL MEDICINE

## 2020-06-25 PROCEDURE — 94799 UNLISTED PULMONARY SVC/PX: CPT

## 2020-06-25 PROCEDURE — 94640 AIRWAY INHALATION TREATMENT: CPT

## 2020-06-25 PROCEDURE — 40000275 ZZH STATISTIC RCP TIME EA 10 MIN

## 2020-06-25 PROCEDURE — 99207 ZZC CDG-MDM COMPONENT: MEETS MODERATE - UP CODED: CPT | Performed by: INTERNAL MEDICINE

## 2020-06-25 PROCEDURE — 25000125 ZZHC RX 250: Performed by: INTERNAL MEDICINE

## 2020-06-25 PROCEDURE — 94640 AIRWAY INHALATION TREATMENT: CPT | Mod: 76

## 2020-06-25 PROCEDURE — 36415 COLL VENOUS BLD VENIPUNCTURE: CPT | Performed by: INTERNAL MEDICINE

## 2020-06-25 PROCEDURE — 80048 BASIC METABOLIC PNL TOTAL CA: CPT | Performed by: INTERNAL MEDICINE

## 2020-06-25 PROCEDURE — 87070 CULTURE OTHR SPECIMN AEROBIC: CPT | Performed by: INTERNAL MEDICINE

## 2020-06-25 PROCEDURE — 87205 SMEAR GRAM STAIN: CPT | Performed by: INTERNAL MEDICINE

## 2020-06-25 PROCEDURE — 99233 SBSQ HOSP IP/OBS HIGH 50: CPT | Performed by: INTERNAL MEDICINE

## 2020-06-25 RX ORDER — COLCHICINE 0.6 MG/1
0.6 TABLET ORAL 2 TIMES DAILY PRN
Status: DISCONTINUED | OUTPATIENT
Start: 2020-06-25 | End: 2020-07-01 | Stop reason: HOSPADM

## 2020-06-25 RX ORDER — TIOTROPIUM BROMIDE 18 UG/1
18 CAPSULE ORAL; RESPIRATORY (INHALATION) DAILY
Status: DISCONTINUED | OUTPATIENT
Start: 2020-06-25 | End: 2020-06-26

## 2020-06-25 RX ORDER — LEVOFLOXACIN 500 MG/1
500 TABLET, FILM COATED ORAL DAILY
Status: DISCONTINUED | OUTPATIENT
Start: 2020-06-25 | End: 2020-07-01 | Stop reason: HOSPADM

## 2020-06-25 RX ORDER — FOLIC ACID 1 MG/1
1 TABLET ORAL DAILY
Status: DISCONTINUED | OUTPATIENT
Start: 2020-06-25 | End: 2020-07-01 | Stop reason: HOSPADM

## 2020-06-25 RX ORDER — METHYLPREDNISOLONE SODIUM SUCCINATE 125 MG/2ML
60 INJECTION, POWDER, LYOPHILIZED, FOR SOLUTION INTRAMUSCULAR; INTRAVENOUS EVERY 8 HOURS
Status: DISCONTINUED | OUTPATIENT
Start: 2020-06-25 | End: 2020-06-26

## 2020-06-25 RX ADMIN — BUDESONIDE 0.5 MG: 0.5 INHALANT RESPIRATORY (INHALATION) at 19:21

## 2020-06-25 RX ADMIN — IPRATROPIUM BROMIDE AND ALBUTEROL SULFATE 3 ML: .5; 3 SOLUTION RESPIRATORY (INHALATION) at 15:15

## 2020-06-25 RX ADMIN — BUDESONIDE 0.5 MG: 0.5 INHALANT RESPIRATORY (INHALATION) at 07:01

## 2020-06-25 RX ADMIN — IPRATROPIUM BROMIDE AND ALBUTEROL SULFATE 3 ML: .5; 3 SOLUTION RESPIRATORY (INHALATION) at 07:01

## 2020-06-25 RX ADMIN — IPRATROPIUM BROMIDE AND ALBUTEROL SULFATE 3 ML: .5; 3 SOLUTION RESPIRATORY (INHALATION) at 11:28

## 2020-06-25 RX ADMIN — IPRATROPIUM BROMIDE AND ALBUTEROL SULFATE 3 ML: .5; 3 SOLUTION RESPIRATORY (INHALATION) at 22:55

## 2020-06-25 RX ADMIN — ENOXAPARIN SODIUM 40 MG: 40 INJECTION SUBCUTANEOUS at 11:30

## 2020-06-25 RX ADMIN — SODIUM CHLORIDE, POTASSIUM CHLORIDE, SODIUM LACTATE AND CALCIUM CHLORIDE: 600; 310; 30; 20 INJECTION, SOLUTION INTRAVENOUS at 02:21

## 2020-06-25 RX ADMIN — METHYLPREDNISOLONE SODIUM SUCCINATE 62.5 MG: 125 INJECTION, POWDER, FOR SOLUTION INTRAMUSCULAR; INTRAVENOUS at 16:35

## 2020-06-25 RX ADMIN — FOLIC ACID 1 MG: 1 TABLET ORAL at 11:57

## 2020-06-25 RX ADMIN — IPRATROPIUM BROMIDE AND ALBUTEROL SULFATE 3 ML: .5; 3 SOLUTION RESPIRATORY (INHALATION) at 19:21

## 2020-06-25 RX ADMIN — TIOTROPIUM BROMIDE 18 MCG: 18 CAPSULE ORAL; RESPIRATORY (INHALATION) at 13:51

## 2020-06-25 RX ADMIN — LEVOFLOXACIN 500 MG: 500 TABLET, FILM COATED ORAL at 11:57

## 2020-06-25 RX ADMIN — METHYLPREDNISOLONE SODIUM SUCCINATE 62.5 MG: 125 INJECTION, POWDER, FOR SOLUTION INTRAMUSCULAR; INTRAVENOUS at 07:47

## 2020-06-25 RX ADMIN — ASPIRIN 81 MG: 81 TABLET ORAL at 07:47

## 2020-06-25 RX ADMIN — ALBUTEROL SULFATE 2.5 MG: 2.5 SOLUTION RESPIRATORY (INHALATION) at 03:15

## 2020-06-25 RX ADMIN — NICOTINE 1 PATCH: 21 PATCH, EXTENDED RELEASE TRANSDERMAL at 07:48

## 2020-06-25 ASSESSMENT — ACTIVITIES OF DAILY LIVING (ADL)
ADLS_ACUITY_SCORE: 14

## 2020-06-25 NOTE — PLAN OF CARE
DATE & TIME: 6/25/2020 [id number]    Cognitive Concerns/ Orientation : A&Ox3, disoriented to time, forgetful   BEHAVIOR & AGGRESSION TOOL COLOR: Green, calm & cooperative  CIWA SCORE: n/a   ABNL VS/O2: VSS on 2L nc, sats mid 90s  MOBILITY: Up with SBA  PAIN MANAGMENT: Denies pain  DIET: Regular diet  BOWEL/BLADDER: Continent   ABNL LAB/BG: n/a  DRAIN/DEVICES: PIV SL  TELEMETRY RHYTHM: n/a  SKIN: Bruising, intact  TESTS/PROCEDURES: n/a  D/C DAY/GOALS/PLACE: Possible discharge tomorrow, plan for return to home  OTHER IMPORTANT INFO: LS diminished, ALONSO.

## 2020-06-25 NOTE — PROGRESS NOTES
SW: SILVIO received VM from Abby ([phone]) at [facility] informing that patient is open to HC services; RN/PT/OT/HHA.    NIURKA Ibrahim  Daytime (8:00am-4:30pm): [phone]  After-Hours SILVIO Pager (4:30pm-11:30pm): [phone]

## 2020-06-25 NOTE — PROGRESS NOTES
Clinic Care Coordination Contact    Follow Up Progress Note      Assessment: After homecare initiated patient went to ED for COPD exacerbation. Patient currently still admitted.   Spoke with Daughter/Padmini to check in. She noted that long term they think moving patient and spouse to assisted living would be beneficial and they are starting the process to have a realtor come and assess pt's home. Family is exploring facilities.   Homecare staff were able to get patient to shower. Pt's spouse is being admitted to [facility] acute rehab for 5-10 days. Discussed homecare since already in place would probably continue at discharge unless care team at hospital feels patient needs TCU.     Plan:   Patient currently admitted to the hospital for COPD exacerbation. Will continue to follow and plan to outreach on transition at discharge    Sonia Bray, MSALAN, [facility]  Clinic Care Coordinator  [email or web address]  [phone]

## 2020-06-25 NOTE — PLAN OF CARE
Dis to time. Forgetful. VSS on 4-5L. C/o SOB overnight, improved after neb. LS Diminished. Up with 1 assist. COVID negative.

## 2020-06-25 NOTE — PROGRESS NOTES
[facility]    Hospitalist Progress Note    Brief Summary:  Jah Tate is a 75 year-old male with history of oxygen dependent COPD, dementia, gout, chronic lower extremity edema who presents with shortness of breath. Admitted on 6/24/2020 for COPD exacerbation.     Assessment & Plan     Acute exacerbation of COPD  Acute on chronic hypoxic hypercapnic aspiratory failure    Presented with acute shortness of breath found to be hypoxic on his baseline oxygen through nasal cannula, mildly elevated white blood cell count, chest x-ray no obvious infiltrate.  He was [place] [facility] and [place] BiPAP.  He is off BiPAP at this time.  I will increase his Solu-Medrol to 60 mg every 8 hours, can continue with DuoNeb nebulization every 4 hours, Pulmicort nebulization 0.5 mg twice daily, add Spiriva.  I will stop azithromycin start him on levofloxacin.  He is now making quite a bit of yellow thick sputum, will send the sputum for Gram stain culture.  COVID-19 come back negative.  Started on aggressive pulmonary toilet with incentive spirometry and flutter.    COVID19 test negative     Chronic thrombocytopenia  Stable, slightly improved as compared to yesterday.  TSH, B12, folate acid check are all in normal ranges in the past.  Follow-up as outpatient.       Gout  Prior to admission on colchicine PRN. No signs of flare at this time.      Chronic bilateral lower extremity edema  Dependent edema, consistent with venous stasis.   - Elevate legs     Dementia  Limited metabolic work-up negative last admission (TSH, vitamin B12). Currently alert and oriented except for year.     - Maintain normal day/night, sleep wake cycles  - Minimize sedating/altering medications as able  - Treat separate conditions as detailed above/below      Tobacco dependence  - Nicotine patch ordered      Increase Solu-Medrol to 60 mg every 8 hours  Start him on Spiriva  Start him on incentive spirometry and flutter  Stop IV  fluids  Ambulate him 3 times a day.      DVT Prophylaxis: Enoxaparin (Lovenox) SQ  Code Status: Full Code    Disposition: Expected discharge in 1 to 2 days once his COPD improved.    Aidan Quintana MD  Text Page  (7am - 6pm)    Interval History   Patient complains of shortness of breath this morning coughing a lot and coughing up yellow thick sputum.  Denies any chest pain fever chills nausea vomiting at this time.    No other significant event overnight    -Data reviewed today: I reviewed all new labs and imaging results over the last 24 hours. I personally reviewed no images or EKG's today.    Physical Exam   Temp: 95.7  F (35.4  C) Temp src: Oral BP: 110/56 Pulse: 87 Heart Rate: 71 Resp: 18 SpO2: 94 % O2 Device: Nasal cannula Oxygen Delivery: 2 LPM  Vitals:    06/24/20 0540 06/25/20 0438   Weight: 78.5 kg (173 lb 1 oz) 78.9 kg (173 lb 15.1 oz)     Vital Signs with Ranges  Temp:  [95.7  F (35.4  C)-98.2  F (36.8  C)] 95.7  F (35.4  C)  Pulse:  [71-87] 87  Heart Rate:  [] 71  Resp:  [18-20] 18  BP: (110-143)/(56-84) 110/56  SpO2:  [94 %-100 %] 94 %  I/O last 3 completed shifts:  In: 783.75 [I.V.:783.75]  Out: 1825 [Urine:1825]    Constitutional: awake, alert, cooperative, no apparent distress, and appears stated age  Eyes: Lids and lashes normal, pupils equal, round and reactive to light, extra ocular muscles intact, sclera clear, conjunctiva normal  Respiratory: No increased work of breathing, diminished air entry bilaterally, no obvious crackles or wheezing  Cardiovascular: Normal apical impulse, regular rate and rhythm, normal S1 and S2, no S3 or S4, and no murmur noted  GI: No scars, normal bowel sounds, soft, non-distended, non-tender, no masses palpated, no hepatosplenomegally  Skin: no bruising or bleeding  Neurologic: No focal deficit    Medications     - MEDICATION INSTRUCTIONS -         aspirin  81 mg Oral Daily     budesonide  0.5 mg Nebulization BID     enoxaparin ANTICOAGULANT  40 mg Subcutaneous  Q24H     folic acid  1 mg Oral Daily     ipratropium - albuterol 0.5 mg/2.5 mg/3 mL  1 vial Nebulization Q4H While awake     levofloxacin  500 mg Oral Daily     methylPREDNISolone  62.5 mg Intravenous Q8H     nicotine  1 patch Transdermal Daily     nicotine   Transdermal Q8H     sodium chloride (PF)  3 mL Intracatheter Q8H     sodium chloride (PF)  3 mL Intracatheter Q8H     tiotropium  18 mcg Inhalation Daily       Data   Recent Labs   Lab 06/25/20  0725 06/24/20  1028 06/24/20  0543   WBC 8.0 8.8 11.9*   HGB 11.8* 13.2* 13.3   MCV 90 91 91   * 83* 94*     --  137   POTASSIUM 4.0  --  3.8   CHLORIDE 104  --  105   CO2 31  --  28   BUN 12  --  13   CR 0.73  --  0.78   ANIONGAP 4  --  4   SHEEBA 8.7  --  8.7   *  --  133*   ALBUMIN  --   --  3.8   PROTTOTAL  --   --  7.5   BILITOTAL  --   --  0.5   ALKPHOS  --   --  79   ALT  --   --  16   AST  --   --  17   TROPI  --   --  0.027       No results found for this or any previous visit (from the past 24 hour(s)).

## 2020-06-25 NOTE — PROGRESS NOTES
Pt does wear home cpap. Pt is eating and does not wish to wear hospital cpap tonight. Pt will call if he chooses to go on cpap. Prachi Ryder, RT

## 2020-06-25 NOTE — PROVIDER NOTIFICATION
MD Notification    Notified Person: MD    Notified Person Name: Dr. Quintana    Notification Date/Time: 6/25/20 @ 1215    Notification Interaction: Telephone    Purpose of Notification: Pt okay to tx given COVID negative status?    Orders Received: Okay to tx.    Comments:

## 2020-06-26 ENCOUNTER — APPOINTMENT (OUTPATIENT)
Dept: CT IMAGING | Facility: CLINIC | Age: 75
DRG: 190 | End: 2020-06-26
Attending: INTERNAL MEDICINE
Payer: COMMERCIAL

## 2020-06-26 ENCOUNTER — MEDICAL CORRESPONDENCE (OUTPATIENT)
Dept: FAMILY MEDICINE | Facility: CLINIC | Age: 75
End: 2020-06-26

## 2020-06-26 PROCEDURE — 25800030 ZZH RX IP 258 OP 636: Performed by: INTERNAL MEDICINE

## 2020-06-26 PROCEDURE — 25000128 H RX IP 250 OP 636: Performed by: INTERNAL MEDICINE

## 2020-06-26 PROCEDURE — 94640 AIRWAY INHALATION TREATMENT: CPT

## 2020-06-26 PROCEDURE — 94640 AIRWAY INHALATION TREATMENT: CPT | Mod: 76

## 2020-06-26 PROCEDURE — 25000132 ZZH RX MED GY IP 250 OP 250 PS 637: Performed by: INTERNAL MEDICINE

## 2020-06-26 PROCEDURE — 12000000 ZZH R&B MED SURG/OB

## 2020-06-26 PROCEDURE — 25000125 ZZHC RX 250: Performed by: INTERNAL MEDICINE

## 2020-06-26 PROCEDURE — 40000275 ZZH STATISTIC RCP TIME EA 10 MIN

## 2020-06-26 PROCEDURE — 99233 SBSQ HOSP IP/OBS HIGH 50: CPT | Performed by: INTERNAL MEDICINE

## 2020-06-26 PROCEDURE — 71275 CT ANGIOGRAPHY CHEST: CPT

## 2020-06-26 RX ORDER — METHYLPREDNISOLONE SODIUM SUCCINATE 125 MG/2ML
60 INJECTION, POWDER, LYOPHILIZED, FOR SOLUTION INTRAMUSCULAR; INTRAVENOUS EVERY 12 HOURS
Status: DISCONTINUED | OUTPATIENT
Start: 2020-06-26 | End: 2020-06-28

## 2020-06-26 RX ORDER — IPRATROPIUM BROMIDE AND ALBUTEROL SULFATE 2.5; .5 MG/3ML; MG/3ML
1 SOLUTION RESPIRATORY (INHALATION)
Status: DISCONTINUED | OUTPATIENT
Start: 2020-06-26 | End: 2020-06-28

## 2020-06-26 RX ORDER — SODIUM CHLORIDE 9 MG/ML
INJECTION, SOLUTION INTRAVENOUS CONTINUOUS
Status: ACTIVE | OUTPATIENT
Start: 2020-06-26 | End: 2020-06-26

## 2020-06-26 RX ORDER — CALCIUM CARBONATE 500 MG/1
1000 TABLET, CHEWABLE ORAL EVERY 4 HOURS PRN
Status: DISCONTINUED | OUTPATIENT
Start: 2020-06-26 | End: 2020-07-01 | Stop reason: HOSPADM

## 2020-06-26 RX ORDER — IOPAMIDOL 755 MG/ML
66 INJECTION, SOLUTION INTRAVASCULAR ONCE
Status: COMPLETED | OUTPATIENT
Start: 2020-06-26 | End: 2020-06-26

## 2020-06-26 RX ADMIN — LEVOFLOXACIN 500 MG: 500 TABLET, FILM COATED ORAL at 08:12

## 2020-06-26 RX ADMIN — IOPAMIDOL 66 ML: 755 INJECTION, SOLUTION INTRAVENOUS at 09:49

## 2020-06-26 RX ADMIN — FOLIC ACID 1 MG: 1 TABLET ORAL at 08:12

## 2020-06-26 RX ADMIN — IPRATROPIUM BROMIDE AND ALBUTEROL SULFATE 3 ML: .5; 3 SOLUTION RESPIRATORY (INHALATION) at 07:26

## 2020-06-26 RX ADMIN — METHYLPREDNISOLONE SODIUM SUCCINATE 62.5 MG: 125 INJECTION, POWDER, FOR SOLUTION INTRAMUSCULAR; INTRAVENOUS at 08:08

## 2020-06-26 RX ADMIN — SODIUM CHLORIDE: 9 INJECTION, SOLUTION INTRAVENOUS at 14:39

## 2020-06-26 RX ADMIN — METHYLPREDNISOLONE SODIUM SUCCINATE 62.5 MG: 125 INJECTION, POWDER, FOR SOLUTION INTRAMUSCULAR; INTRAVENOUS at 00:30

## 2020-06-26 RX ADMIN — SODIUM CHLORIDE 90 ML: 9 INJECTION, SOLUTION INTRAVENOUS at 09:49

## 2020-06-26 RX ADMIN — BUDESONIDE 0.5 MG: 0.5 INHALANT RESPIRATORY (INHALATION) at 19:45

## 2020-06-26 RX ADMIN — METHYLPREDNISOLONE SODIUM SUCCINATE 62.5 MG: 125 INJECTION, POWDER, FOR SOLUTION INTRAMUSCULAR; INTRAVENOUS at 20:12

## 2020-06-26 RX ADMIN — TIOTROPIUM BROMIDE 18 MCG: 18 CAPSULE ORAL; RESPIRATORY (INHALATION) at 08:16

## 2020-06-26 RX ADMIN — BUDESONIDE 0.5 MG: 0.5 INHALANT RESPIRATORY (INHALATION) at 07:26

## 2020-06-26 RX ADMIN — NICOTINE 1 PATCH: 21 PATCH, EXTENDED RELEASE TRANSDERMAL at 08:16

## 2020-06-26 RX ADMIN — ASPIRIN 81 MG: 81 TABLET ORAL at 08:12

## 2020-06-26 RX ADMIN — IPRATROPIUM BROMIDE AND ALBUTEROL SULFATE 3 ML: .5; 3 SOLUTION RESPIRATORY (INHALATION) at 19:45

## 2020-06-26 RX ADMIN — ENOXAPARIN SODIUM 40 MG: 40 INJECTION SUBCUTANEOUS at 12:24

## 2020-06-26 ASSESSMENT — ACTIVITIES OF DAILY LIVING (ADL)
ADLS_ACUITY_SCORE: 13
ADLS_ACUITY_SCORE: 13
ADLS_ACUITY_SCORE: 14
ADLS_ACUITY_SCORE: 13

## 2020-06-26 NOTE — PLAN OF CARE
DATE & TIME: 6/26/2020 2770-6149   Cognitive Concerns/ Orientation : A&Ox4, forgetful.   BEHAVIOR & AGGRESSION TOOL COLOR: Green  CIWA SCORE: NA   ABNL VS/O2: VSS on 2L via NC, sats mid 90s, at baseline.   MOBILITY: Up with SBA, moves in bed independently.    PAIN MANAGMENT: Denies  DIET: Regular  BOWEL/BLADDER: Continent, uses urinal. No BM this shift.   ABNL LAB/BG: NA   DRAIN/DEVICES: LUE PIV SL  TELEMETRY RHYTHM: NA   SKIN: Scattered bruising along arms, skin intact. Feet very dry.  TESTS/PROCEDURES: NA  D/C DAY/GOALS/PLACE: Pending discharge, plan for return to home.  OTHER IMPORTANT INFO: Has scheduled nebs, LS diminished, ALONSO. Encouraged IS and acapella. Infrequent productive cough, pt stated to have yellow sputum.    COMMIT TO SIT DONE AND SIGNED OFF Yes

## 2020-06-26 NOTE — PLAN OF CARE
DATE & TIME: 6/26/2020 9753-1669   Cognitive Concerns/ Orientation : A&Ox4, forgetful.   BEHAVIOR & AGGRESSION TOOL COLOR: Green  CIWA SCORE: NA   ABNL VS/O2: VSS on 2-5L via NC, sats mid 90s  MOBILITY: Up with SBA, very weak and deconditioned, moves in bed independently.    PAIN MANAGMENT: Denies  DIET: Regular  BOWEL/BLADDER: Continent, uses urinal. No BM this shift.   ABNL LAB/BG: NA   DRAIN/DEVICES: LUE PIV SL  TELEMETRY RHYTHM: NA   SKIN: pale. Scattered bruising along arms, skin intact. Feet very dry.  TESTS/PROCEDURES: chest CT done  D/C DAY/GOALS/PLACE: Pending discharge, PT ordered  OTHER IMPORTANT INFO: had episode of increased SOB after duo-neb, CT ordered to r/o PE and negative. Has scheduled nebs, IV steroids taper now, PO abx, LS diminished, ALONSO. Encouraged IS and acapella. Infrequent productive cough, pt stated to have yellow sputum. Nicotine patch on to L deltoid    COMMIT TO SIT DONE AND SIGNED OFF Yes

## 2020-06-26 NOTE — PROGRESS NOTES
[facility]    Hospitalist Progress Note    Brief Summary:  Jah Tate is a 75 year-old male with history of oxygen dependent COPD, dementia, gout, chronic lower extremity edema who presents with shortness of breath. Admitted on 6/24/2020 for COPD exacerbation.     Assessment & Plan     Acute exacerbation of COPD  Acute on chronic hypoxic hypercapnic aspiratory failure  COVID-19 negative    Presented with acute shortness of breath found to be hypoxic on his baseline oxygen through nasal cannula, mildly elevated white blood cell count, chest x-ray no obvious infiltrate.  He was admitted in [facility] and started on BiPAP.  He is off BiPAP at this time.  I did ncrease his Solu-Medrol to 60 mg every 8 hours on 6/25/2020, can continue with DuoNeb nebulization every 4 hours, Pulmicort nebulization 0.5 mg twice daily, add Spiriva continue on levofloxacin.    Started on aggressive pulmonary toilet with incentive spirometry and flutter.  Despite increased Solu-Medrol and Spiriva and aggressive nebulization with DuoNeb is still short of breath.  Decreased air entry bilaterally on examination but no obvious wheezing.  I will go ahead and do the CT scan of the chest PE protocol to rule out any pulmonary embolism or pneumonia.  I will stop the Spiriva as that may be causing him paradoxical bronchospasm, decrease the dose of Solu-Medrol to 60 twice daily at this time.      Chronic thrombocytopenia  Stable, slightly improved as compared to yesterday.  TSH, B12, folate acid check are all in normal ranges in the past.  Follow-up as outpatient.       Gout  Prior to admission on colchicine PRN. No signs of flare at this time.      Chronic bilateral lower extremity edema  Dependent edema, consistent with venous stasis.   - Elevate legs     Dementia  Limited metabolic work-up negative last admission (TSH, vitamin B12). Currently alert and oriented except for year.   - Maintain normal day/night, sleep wake cycles  -  Minimize sedating/altering medications as able  - Treat separate conditions as detailed above/below      Tobacco dependence  - Nicotine patch ordered.  Cessation counseling given.      CT scan chest PE protocol  Continue aggressive DuoNeb nebulization Pulmicort nebulization.  Discontinue Spiriva, decrease her Medrol to 60 twice daily.        DVT Prophylaxis: Enoxaparin (Lovenox) SQ  Code Status: Full Code    Disposition: Expected discharge in 1 to 2 days once his COPD improved.    Aidan Quintana MD  Text Page  (7am - 6pm)    Interval History   Feel more short of breath today, after the nebulization does not improve either.  Denies any chest pain fever chills nausea vomiting.  Coughing some phlegm as well.    No other significant event overnight    -Data reviewed today: I reviewed all new labs and imaging results over the last 24 hours. I personally reviewed no images or EKG's today.    Physical Exam   Temp: 97.5  F (36.4  C) Temp src: Oral BP: 131/66 Pulse: 86 Heart Rate: 91 Resp: 18 SpO2: 94 % O2 Device: Nasal cannula Oxygen Delivery: 5 LPM  Vitals:    06/24/20 0540 06/25/20 0438   Weight: 78.5 kg (173 lb 1 oz) 78.9 kg (173 lb 15.1 oz)     Vital Signs with Ranges  Temp:  [97.5  F (36.4  C)-98.8  F (37.1  C)] 97.5  F (36.4  C)  Pulse:  [86-92] 86  Heart Rate:  [64-95] 91  Resp:  [16-20] 18  BP: (112-131)/(59-71) 131/66  SpO2:  [92 %-98 %] 94 %  I/O last 3 completed shifts:  In: 1391.25 [P.O.:480; I.V.:911.25]  Out: 3125 [Urine:3125]    Constitutional: Looks mildly distressed and flushed.  Eyes: Lids and lashes normal, pupils equal, round and reactive to light, extra ocular muscles intact, sclera clear, conjunctiva normal  Respiratory: No increased work of breathing, diminished air entry bilaterally, no obvious crackles or wheezing  Cardiovascular: Normal apical impulse, regular rate and rhythm, normal S1 and S2, no S3 or S4, and no murmur noted  GI: No scars, normal bowel sounds, soft, non-distended, non-tender, no  masses palpated, no hepatosplenomegally  Skin: no bruising or bleeding  Neurologic: No focal deficit    Medications     - MEDICATION INSTRUCTIONS -         aspirin  81 mg Oral Daily     budesonide  0.5 mg Nebulization BID     enoxaparin ANTICOAGULANT  40 mg Subcutaneous Q24H     folic acid  1 mg Oral Daily     ipratropium - albuterol 0.5 mg/2.5 mg/3 mL  1 vial Nebulization Q4H While awake     levofloxacin  500 mg Oral Daily     methylPREDNISolone  62.5 mg Intravenous Q12H     nicotine  1 patch Transdermal Daily     nicotine   Transdermal Q8H     sodium chloride (PF)  3 mL Intracatheter Q8H     sodium chloride (PF)  3 mL Intracatheter Q8H       Data   Recent Labs   Lab 06/25/20  0725 06/24/20  1028 06/24/20  0543   WBC 8.0 8.8 11.9*   HGB 11.8* 13.2* 13.3   MCV 90 91 91   * 83* 94*     --  137   POTASSIUM 4.0  --  3.8   CHLORIDE 104  --  105   CO2 31  --  28   BUN 12  --  13   CR 0.73  --  0.78   ANIONGAP 4  --  4   SHEEBA 8.7  --  8.7   *  --  133*   ALBUMIN  --   --  3.8   PROTTOTAL  --   --  7.5   BILITOTAL  --   --  0.5   ALKPHOS  --   --  79   ALT  --   --  16   AST  --   --  17   TROPI  --   --  0.027       Recent Results (from the past 24 hour(s))   CT Chest Pulmonary Embolism w Contrast    Narrative    CT CHEST PULMONARY EMBOLISM WITH CONTRAST June 26, 2020 9:58 AM     HISTORY: Shortness of breath.    COMPARISON: None.    TECHNIQUE: Volumetric helical acquisition of CT images of the chest  from the lung apices to the kidneys were acquired after the  administration of 66 mL Isovue-370  IV contrast. Radiation dose for  this scan was reduced using automated exposure control, adjustment of  the mA and/or kV according to patient size, or iterative  reconstruction technique.    FINDINGS: There is no pulmonary embolism. Continued lobar volume loss  in the left lower lobe unchanged from previous. Stable moderate to  severe emphysema. Similar elevated left hemidiaphragm. Moderate  bronchial wall  thickening again evident. The heart is not enlarged.  Thoracic aorta is atherosclerotic without evidence of dissection or  aneurysm. There is no pleural or pericardial effusion.  There is no  pneumothorax. Adrenal glands are unremarkable where seen. Remainder of  the visualized upper abdomen is unremarkable.      Impression    IMPRESSION:   1. No pulmonary embolism demonstrated.  2. No thoracic aortic dissection or aneurysm.   3. Unchanged lobar probable atelectasis of the left lower lobe.

## 2020-06-26 NOTE — PLAN OF CARE
Cognitive Concerns/ Orientation : A&Ox3, disoriented to time, forgetful. Rounded on freq.   BEHAVIOR & AGGRESSION TOOL COLOR: Green  CIWA SCORE: NA   ABNL VS/O2: VSS on 2L via NC, sats mid 90s. Pt did have some difficulty with breathing this evening. Improved with scheduled nebs, coughing and clearing. Sputum sample collected and sent. Pt did ask for O2 to be increased to 4L this evening for comfort.   MOBILITY: Up with SBA, moves in bed independently. Refused ambulation d/t dyspnea with exertion.   PAIN MANAGMENT: Denies pain  DIET: Regular diet, good appetite and oral intake.   BOWEL/BLADDER: Continent, using urinal. No BM this shift.   ABNL LAB/BG: AM , Sputum collected and sent   DRAIN/DEVICES: LUE PIV SL  TELEMETRY RHYTHM: NA   SKIN: Scattered bruising along arms, skin intact.  TESTS/PROCEDURES: Sputum Gram Stain sent   D/C DAY/GOALS/PLACE: Possible discharge tomorrow, plan for return to home  OTHER IMPORTANT INFO: Pt does not always call appropriately, rounded on freq, scheduled nebs, LS diminished, ALONSO. Encouraged IS and breathing exercises.

## 2020-06-26 NOTE — CONSULTS
Care Transition Initial Assessment - RN        Met briefly with pt this AM and had conversed with his Daughter Stephanie over the phone  DATA   Active Problems:    COPD exacerbation (H)       Cognitive Status: awake, alert, oriented/forgetful        Contact information and PCP information verified: Yes  Lives With: spouse     Insurance concerns: has [employer], that can be problematic when needing a TCU    ASSESSMENT  Patient currently receives the following services: Home Health Care [facility]. RN/PT/OT/HHA       Identified issues/concerns regarding health management: Pt's spouse had a CVI two weeks ago and is currently at an acute rehab facility.  Pt's Daughter Stephanie had been caring for pt two weeks prior to hospitalization.  At first she would leave him alone for the night, but then she would find that he was not taking his meds or nebulizers.  So after a few days she realized she would have to stay overnights too.  Pt still smokes. Pt is on continuous oxygen and smokes while wearing the O2.  His O2 supplier is [employer].  In planning for disposition, Stephanie feels it is a little difficult for her to care for her father, as she has her own family and pt's son is not able to care for him.  Stephanie noted she had talked to her mother about this and she asked if her father could go to a short term facility until his spouse would be able to care for him.  He has [employer] insurance, so would need authorization.  PT was ordered today.  His son lives in [place] and Stephanie in [place], so they would want a TCU in that vicinity.  Pt is on nebs, which can also be a deterrent for TCU acceptance due to pandemic measures.    In discussion with Stephanie, asked if pt would be able to afford private pay at a care center, in the event PT does not recommend a TCU and or [employer] does not authorize this.  Stephanie will plan to take pt to her home, if he does not have a rehab need.  She is also planning to care for her mother when she first gets out of  rehab.  She will not allow pt to smoke in her home and is hoping his time away from his home that he will not crave this.  Unsure if he has a Nicotine patch on.  He appeared calm here.  Have updated SW on plans.    Stephanie has an out of town obligation, so will not be back until Sunday.  She can be reached on her cell phone.    Disposition to be determined.  If pt does discharge home with his daughter he will need home care resumed through [facility] they will need to know daughter's address.    A follow-up appointment has been scheduled with his PCP clinic on 7/2/20.        Care  (CTS) will continue to follow as needed.    Sally Urban RN  Inpatient Care Coordination  [phone]

## 2020-06-27 ENCOUNTER — APPOINTMENT (OUTPATIENT)
Dept: PHYSICAL THERAPY | Facility: CLINIC | Age: 75
DRG: 190 | End: 2020-06-27
Attending: INTERNAL MEDICINE
Payer: COMMERCIAL

## 2020-06-27 LAB
ALBUMIN SERPL-MCNC: 3.4 G/DL (ref 3.4–5)
ANION GAP SERPL CALCULATED.3IONS-SCNC: 3 MMOL/L (ref 3–14)
BACTERIA SPEC CULT: NORMAL
BASOPHILS # BLD AUTO: 0 10E9/L (ref 0–0.2)
BASOPHILS NFR BLD AUTO: 0.1 %
BUN SERPL-MCNC: 22 MG/DL (ref 7–30)
CALCIUM SERPL-MCNC: 8.5 MG/DL (ref 8.5–10.1)
CHLORIDE SERPL-SCNC: 99 MMOL/L (ref 94–109)
CO2 SERPL-SCNC: 33 MMOL/L (ref 20–32)
CREAT SERPL-MCNC: 0.86 MG/DL (ref 0.66–1.25)
DIFFERENTIAL METHOD BLD: ABNORMAL
EOSINOPHIL # BLD AUTO: 0 10E9/L (ref 0–0.7)
EOSINOPHIL NFR BLD AUTO: 0 %
ERYTHROCYTE [DISTWIDTH] IN BLOOD BY AUTOMATED COUNT: 14.1 % (ref 10–15)
GFR SERPL CREATININE-BSD FRML MDRD: 83 ML/MIN/{1.73_M2}
GLUCOSE SERPL-MCNC: 117 MG/DL (ref 70–99)
HCT VFR BLD AUTO: 39.3 % (ref 40–53)
HGB BLD-MCNC: 12.9 G/DL (ref 13.3–17.7)
IMM GRANULOCYTES # BLD: 0.2 10E9/L (ref 0–0.4)
IMM GRANULOCYTES NFR BLD: 1.5 %
LACTATE BLD-SCNC: 2.1 MMOL/L (ref 0.7–2)
LYMPHOCYTES # BLD AUTO: 0.8 10E9/L (ref 0.8–5.3)
LYMPHOCYTES NFR BLD AUTO: 5.8 %
MAGNESIUM SERPL-MCNC: 2.3 MG/DL (ref 1.6–2.3)
MCH RBC QN AUTO: 29.5 PG (ref 26.5–33)
MCHC RBC AUTO-ENTMCNC: 32.8 G/DL (ref 31.5–36.5)
MCV RBC AUTO: 90 FL (ref 78–100)
MONOCYTES # BLD AUTO: 1.3 10E9/L (ref 0–1.3)
MONOCYTES NFR BLD AUTO: 10.2 %
NEUTROPHILS # BLD AUTO: 10.7 10E9/L (ref 1.6–8.3)
NEUTROPHILS NFR BLD AUTO: 82.4 %
NRBC # BLD AUTO: 0 10*3/UL
NRBC BLD AUTO-RTO: 0 /100
PHOSPHATE SERPL-MCNC: 3.9 MG/DL (ref 2.5–4.5)
PLATELET # BLD AUTO: 181 10E9/L (ref 150–450)
POTASSIUM SERPL-SCNC: 3.9 MMOL/L (ref 3.4–5.3)
RBC # BLD AUTO: 4.37 10E12/L (ref 4.4–5.9)
SODIUM SERPL-SCNC: 135 MMOL/L (ref 133–144)
SPECIMEN SOURCE: NORMAL
WBC # BLD AUTO: 13 10E9/L (ref 4–11)

## 2020-06-27 PROCEDURE — 25000125 ZZHC RX 250: Performed by: INTERNAL MEDICINE

## 2020-06-27 PROCEDURE — 83735 ASSAY OF MAGNESIUM: CPT | Performed by: INTERNAL MEDICINE

## 2020-06-27 PROCEDURE — 97116 GAIT TRAINING THERAPY: CPT | Mod: GP

## 2020-06-27 PROCEDURE — 94640 AIRWAY INHALATION TREATMENT: CPT | Mod: 76

## 2020-06-27 PROCEDURE — 25000132 ZZH RX MED GY IP 250 OP 250 PS 637: Performed by: INTERNAL MEDICINE

## 2020-06-27 PROCEDURE — 25800030 ZZH RX IP 258 OP 636: Performed by: INTERNAL MEDICINE

## 2020-06-27 PROCEDURE — 40000275 ZZH STATISTIC RCP TIME EA 10 MIN

## 2020-06-27 PROCEDURE — 99232 SBSQ HOSP IP/OBS MODERATE 35: CPT | Performed by: INTERNAL MEDICINE

## 2020-06-27 PROCEDURE — 25000128 H RX IP 250 OP 636: Performed by: INTERNAL MEDICINE

## 2020-06-27 PROCEDURE — 36415 COLL VENOUS BLD VENIPUNCTURE: CPT | Performed by: INTERNAL MEDICINE

## 2020-06-27 PROCEDURE — 97530 THERAPEUTIC ACTIVITIES: CPT | Mod: GP

## 2020-06-27 PROCEDURE — 12000000 ZZH R&B MED SURG/OB

## 2020-06-27 PROCEDURE — 85025 COMPLETE CBC W/AUTO DIFF WBC: CPT | Performed by: INTERNAL MEDICINE

## 2020-06-27 PROCEDURE — 97161 PT EVAL LOW COMPLEX 20 MIN: CPT | Mod: GP

## 2020-06-27 PROCEDURE — 80069 RENAL FUNCTION PANEL: CPT | Performed by: INTERNAL MEDICINE

## 2020-06-27 PROCEDURE — 83605 ASSAY OF LACTIC ACID: CPT | Performed by: INTERNAL MEDICINE

## 2020-06-27 PROCEDURE — 94640 AIRWAY INHALATION TREATMENT: CPT

## 2020-06-27 RX ADMIN — BUDESONIDE 0.5 MG: 0.5 INHALANT RESPIRATORY (INHALATION) at 07:06

## 2020-06-27 RX ADMIN — IPRATROPIUM BROMIDE AND ALBUTEROL SULFATE 3 ML: .5; 3 SOLUTION RESPIRATORY (INHALATION) at 14:47

## 2020-06-27 RX ADMIN — BUDESONIDE 0.5 MG: 0.5 INHALANT RESPIRATORY (INHALATION) at 20:08

## 2020-06-27 RX ADMIN — NICOTINE 1 PATCH: 21 PATCH, EXTENDED RELEASE TRANSDERMAL at 08:00

## 2020-06-27 RX ADMIN — IPRATROPIUM BROMIDE AND ALBUTEROL SULFATE 3 ML: .5; 3 SOLUTION RESPIRATORY (INHALATION) at 07:06

## 2020-06-27 RX ADMIN — FOLIC ACID 1 MG: 1 TABLET ORAL at 07:54

## 2020-06-27 RX ADMIN — IPRATROPIUM BROMIDE AND ALBUTEROL SULFATE 3 ML: .5; 3 SOLUTION RESPIRATORY (INHALATION) at 11:47

## 2020-06-27 RX ADMIN — SODIUM CHLORIDE 500 ML: 9 INJECTION, SOLUTION INTRAVENOUS at 17:42

## 2020-06-27 RX ADMIN — ENOXAPARIN SODIUM 40 MG: 40 INJECTION SUBCUTANEOUS at 12:47

## 2020-06-27 RX ADMIN — METHYLPREDNISOLONE SODIUM SUCCINATE 62.5 MG: 125 INJECTION, POWDER, FOR SOLUTION INTRAMUSCULAR; INTRAVENOUS at 19:51

## 2020-06-27 RX ADMIN — METHYLPREDNISOLONE SODIUM SUCCINATE 62.5 MG: 125 INJECTION, POWDER, FOR SOLUTION INTRAMUSCULAR; INTRAVENOUS at 07:55

## 2020-06-27 RX ADMIN — ASPIRIN 81 MG: 81 TABLET ORAL at 07:54

## 2020-06-27 RX ADMIN — LEVOFLOXACIN 500 MG: 500 TABLET, FILM COATED ORAL at 07:54

## 2020-06-27 RX ADMIN — IPRATROPIUM BROMIDE AND ALBUTEROL SULFATE 3 ML: .5; 3 SOLUTION RESPIRATORY (INHALATION) at 20:08

## 2020-06-27 RX ADMIN — ALBUTEROL SULFATE 2.5 MG: 2.5 SOLUTION RESPIRATORY (INHALATION) at 04:41

## 2020-06-27 ASSESSMENT — ACTIVITIES OF DAILY LIVING (ADL)
ADLS_ACUITY_SCORE: 13
ADLS_ACUITY_SCORE: 12

## 2020-06-27 NOTE — PLAN OF CARE
PT orders received & acknowledged. Chart reviewed. Eval completed & treatment initiated.     Patient lives in a house with his wife (who is currently in rehab post stroke) and there are 2 streps to enter the home with no railing. Daughter lives in town and has been helping out. At baseline patient is IND with functional mobility & ADLs.    Discharge Planner PT   Patient plan for discharge: Agrees he's not safe to return to home alone  Current status: Greeted patient supine in bed and agreeable to session. VSS on 2L O2. Engaged patient in supine <> EOB at SBA. Engaged patient in sit <> stand with FWW at [facility]. Engaged patient in ambulation with FWW at [facility] for 125ft + 125ft at [facility] with a seated rest break due to fatigue, SOB and desating to 88% on 2L - requiring 3min of rest, cues for breathing technique and 3L of O2 to recover to >90%. Patient ambulates with a gait speed of 0.33m/s. Patient able to perform 3 reps for the 30 second chair stand test. Post ambulation, patient declines further activity due to fatigue & SOB. Discussed discharge recommendation. Concluded session with patient supine in bed, all need in reach and alarm engaged.   Barriers to return to prior living situation: SOB/ALONSO & desating with activity when on 2L O2, LE weakness, impaired balance, high fall risk, decreased safety awareness, stairs (not assessed yet)  Recommendations for discharge: TCU  Rationale for recommendations: Patient is below baseline for functional mobility & would benefit from continued physical therapy in the setting of a TCU for improving functional mobility, LE strength and tolerance for functional activities in order to return to baseline of functioning.          Entered by: Laurie Edwards 06/27/2020 2:47 PM

## 2020-06-27 NOTE — PROGRESS NOTES
30 second sit <> stand  - Assessing Leg Strength & Endurance    Number of full stands from a seated position in 30 seconds with arms  folded across chest. If patient requires use of arms or assistance, they receive a score of 0.     Patient Score: 3 - per patient limited by SOB onset    Normative Repetitions for 30 second Chair Stand Test for community residing individuals. (Emilia & Adrian, 2013)    Age in Years 60-64 65-69 70-74 75-79 80-84 85-89 90-94   Men 14-19 12-18 12-17 11-17 10-15 8-14 7-12   Female 12-17 11-16 10-15 10-15 9-14 8-13 4-11     Excellent test -retest reliability r=0.89 (95% Confidence interval 0.79-0.93)  Excellent interrater reliability r=0.95 (95% CI = 0.84-0.97)  Excellent criterion validity of the chair stand compared to weight adjusted leg press performance: r=0.77 (95% CI = 0.64-0.85)  Correlations with knee extensor force  -Range 0.55 to 0.64 with hands  -Range 0.65 to 0.71 without hands      Gait Speed Assessment    Gait speed is the single best predictor of decline in health & function. A gait speed of 1.20-1.40 meters/second is considered normal. A gait speed of <1.0 meter/second is predictive of increased risk of falls as well as functional limitations, hospitalization and death.      Patient gait speed: 0.33m/s    Household walker: <0.40 m/s  Limited community ambulator: 0.40-0.80 m/s  Community ambulator: 0.80-1.25 m/s  Normal ambulation/crossing streets: >1.25m/s    (Pieter & Radha, 2009) & (Yoly et al.,2005)

## 2020-06-27 NOTE — PROGRESS NOTES
[facility]    Hospitalist Progress Note    Brief Summary:  Jah Tate is a 75 year-old male with history of oxygen dependent COPD, dementia, gout, chronic lower extremity edema who presents with shortness of breath. Admitted on 6/24/2020 for COPD exacerbation.     Assessment & Plan     Acute exacerbation of COPD  Acute on chronic hypoxic hypercapnic aspiratory failure  COVID-19 negative    Presented with acute shortness of breath found to be hypoxic on his baseline oxygen through nasal cannula, mildly elevated white blood cell count, chest x-ray no obvious infiltrate.  He was admitted in [facility] and started on BiPAP.  He is off BiPAP at this time.  I did ncrease his Solu-Medrol to 60 mg every 8 hours on 6/25/2020, can continue with DuoNeb nebulization every 4 hours, Pulmicort nebulization 0.5 mg twice daily, add Spiriva continue on levofloxacin.    Started on aggressive pulmonary toilet with incentive spirometry and flutter.  Despite increased Solu-Medrol and Spiriva and aggressive nebulization with DuoNeb is still short of breath.  Decreased air entry bilaterally on examination but no obvious wheezing.   CT scan of the chest PE protocol, negative for PE, dissection infiltrate or pneumothorax.  Continue DuoNeb nebulization 4 times a day and IV Solu-Medrol 60 twice daily.  We will try to wean him down from tomorrow.    Chronic thrombocytopenia  Stable, slightly improved as compared to yesterday.  TSH, B12, folate acid check are all in normal ranges in the past.  Follow-up as outpatient.       Gout  Prior to admission on colchicine PRN. No signs of flare at this time.      Chronic bilateral lower extremity edema  Dependent edema, consistent with venous stasis.   - Elevate legs     Dementia  Limited metabolic work-up negative last admission (TSH, vitamin B12). Currently alert and oriented except for year.   - Maintain normal day/night, sleep wake cycles  - Minimize sedating/altering medications as  able  - Treat separate conditions as detailed above/below      Tobacco dependence  - Nicotine patch ordered.  Cessation counseling given.    Try to use of mouthpiece instead of mask for the nebulization.  Overall improved today.      DVT Prophylaxis: Enoxaparin (Lovenox) SQ  Code Status: Full Code    Disposition: Expected discharge in 1 to 2 days once his COPD improved.    Aidan Qiuntana MD  Text Page  (7am - 6pm)    Interval History   Feeling somewhat better today, shortness of breath is improved as compared to yesterday.  He felt claustrophobic when put the mask for the nebulization.  Denies any chest pain nausea vomiting headache dizziness or lightheadedness today.    No other significant event overnight    -Data reviewed today: I reviewed all new labs and imaging results over the last 24 hours. I personally reviewed no images or EKG's today.    Physical Exam   Temp: 98.5  F (36.9  C) Temp src: Oral BP: 126/70   Heart Rate: 90 Resp: 18 SpO2: 93 % O2 Device: Nasal cannula Oxygen Delivery: 3 LPM  Vitals:    06/24/20 0540 06/25/20 0438   Weight: 78.5 kg (173 lb 1 oz) 78.9 kg (173 lb 15.1 oz)     Vital Signs with Ranges  Temp:  [98.2  F (36.8  C)-98.5  F (36.9  C)] 98.5  F (36.9  C)  Heart Rate:  [70-90] 90  Resp:  [16-18] 18  BP: (126-139)/(67-72) 126/70  SpO2:  [85 %-97 %] 93 %  I/O last 3 completed shifts:  In: 480 [P.O.:480]  Out: 2925 [Urine:2925]    Constitutional: Awake alert and in no distress this morning.  Eyes: Lids and lashes normal, pupils equal, round and reactive to light, extra ocular muscles intact, sclera clear, conjunctiva normal  Respiratory: No increased work of breathing, diminished air entry bilaterally, no obvious crackles or wheezing  Cardiovascular: Normal apical impulse, regular rate and rhythm, normal S1 and S2, no S3 or S4, and no murmur noted  GI: No scars, normal bowel sounds, soft, non-distended, non-tender, no masses palpated, no hepatosplenomegally  Skin: no bruising or  bleeding  Neurologic: No focal deficit    Medications     - MEDICATION INSTRUCTIONS -         aspirin  81 mg Oral Daily     budesonide  0.5 mg Nebulization BID     enoxaparin ANTICOAGULANT  40 mg Subcutaneous Q24H     folic acid  1 mg Oral Daily     ipratropium - albuterol 0.5 mg/2.5 mg/3 mL  1 vial Nebulization 4x daily     levofloxacin  500 mg Oral Daily     methylPREDNISolone  62.5 mg Intravenous Q12H     nicotine  1 patch Transdermal Daily     nicotine   Transdermal Q8H     sodium chloride (PF)  3 mL Intracatheter Q8H     sodium chloride (PF)  3 mL Intracatheter Q8H       Data   Recent Labs   Lab 06/27/20  0748 06/25/20  0725 06/24/20  1028 06/24/20  0543   WBC 13.0* 8.0 8.8 11.9*   HGB 12.9* 11.8* 13.2* 13.3   MCV 90 90 91 91    104* 83* 94*    139  --  137   POTASSIUM 3.9 4.0  --  3.8   CHLORIDE 99 104  --  105   CO2 33* 31  --  28   BUN 22 12  --  13   CR 0.86 0.73  --  0.78   ANIONGAP 3 4  --  4   SHEEBA 8.5 8.7  --  8.7   * 147*  --  133*   ALBUMIN 3.4  --   --  3.8   PROTTOTAL  --   --   --  7.5   BILITOTAL  --   --   --  0.5   ALKPHOS  --   --   --  79   ALT  --   --   --  16   AST  --   --   --  17   TROPI  --   --   --  0.027       No results found for this or any previous visit (from the past 24 hour(s)).

## 2020-06-27 NOTE — PLAN OF CARE
DATE & TIME: 6/26/2020 1900-0730   Cognitive Concerns/ Orientation : A&Ox4, forgetful.   BEHAVIOR & AGGRESSION TOOL COLOR: Green  CIWA SCORE: NA   ABNL VS/O2: VSS on 5L via NC, SpO2 mid 90s  MOBILITY: Up with SBA, (not witnessed) moves in bed independently.    PAIN MANAGMENT: Denies  DIET: Regular  BOWEL/BLADDER: Continent, uses urinal. No BM this shift.   ABNL LAB/BG: NA   DRAIN/DEVICES: LUE PIV SL  TELEMETRY RHYTHM: NA   SKIN: pale. Scattered bruising along arms, skin intact. Feet very dry.  TESTS/PROCEDURES: chest CT done  D/C DAY/GOALS/PLACE: Pending discharge, PT ordered  OTHER IMPORTANT INFO: had episode of increased SOB, CT ordered to r/o PE and negative 6/26/20. Has scheduled nebs that MD decreased to QID vs Q4h, IV steroids, PO abx.. LS diminished, ALONSO. Encouraged IS and acapella. Frequent productive cough, pt stated to has thick, yellow sputum. Nicotine patch on to L deltoid

## 2020-06-27 NOTE — PROGRESS NOTES
Sepsis Evaluation Progress Note    I was called due to abnormal vital signs triggering the Sepsis SIRS screening alert. He is not known to have an infection.     Physical Exam   Vital Signs:  Temp: 98.2  F (36.8  C) Temp src: Oral BP: 135/75   Heart Rate: 101 Resp: 18 SpO2: 95 % O2 Device: Nasal cannula Oxygen Delivery: 2 LPM    Lab:  Lactic Acid   Date Value Ref Range Status   04/07/2020 0.6 (L) 0.7 - 2.0 mmol/L Final     Lactate for Sepsis Protocol   Date Value Ref Range Status   06/27/2020 2.1 (H) 0.7 - 2.0 mmol/L Final     Comment:     Significant value called to and read back by  KEYONNA SALEEM IN 66 AT 1709 AN         The patient is at baseline mental status.     The rest of their physical exam is significant for Diminished air entry bilaterally because of his COPD    Assessment & Plan   NO EVIDENCE OF SEPSIS at this time.  Vital sign, physical exam, and lab findings are likely due to COPD and duoneb nebulization. .   Lactic acid only 2.1, give 500 ml of NS bolus     Disposition: The patient will remain on the current unit. We will continue to monitor this patient closely..  Aidan Quintana MD    Sepsis Criteria   Sepsis: 2+ SIRS criteria due to infection  Severe Sepsis: Sepsis AND 1+ new sign of acute organ dysfunction (Note: lactate >2 is organ dysfunction)  Septic Shock: Sepsis AND hypotension despite volume resuscitation with 30 ml/kg crystalloid

## 2020-06-27 NOTE — PLAN OF CARE
DATE & TIME: 6/27/2020 8934-7985  Cognitive Concerns/ Orientation : A&Ox4, forgetful.   BEHAVIOR & AGGRESSION TOOL COLOR: Green  CIWA SCORE: NA   ABNL VS/O2: VSS on 2L via NC, SpO2 mid 90s, desats with ambulation to 88%  MOBILITY: Up with SBA, moves in bed independently.    PAIN MANAGMENT: Denies  DIET: Regular  BOWEL/BLADDER: Continent, uses urinal. No BM this shift.   ABNL LAB/BG: NA   DRAIN/DEVICES: LUE PIV SL  TELEMETRY RHYTHM: NA   SKIN: pale. Scattered bruising along arms, skin intact. Feet very dry.  TESTS/PROCEDURES: chest CT done  D/C DAY/GOALS/PLACE: Pending discharge poss tomorrow, PT ordered  OTHER IMPORTANT INFO: LS diminished, ALONSO. Encouraged IS and acapella. Frequent productive cough, pt stated to has thick, yellow sputum. Nicotine patch on to L deltoid    COMMIT TO SIT DONE AND SIGNED OFF Yes

## 2020-06-28 ENCOUNTER — APPOINTMENT (OUTPATIENT)
Dept: PHYSICAL THERAPY | Facility: CLINIC | Age: 75
DRG: 190 | End: 2020-06-28
Payer: COMMERCIAL

## 2020-06-28 ENCOUNTER — APPOINTMENT (OUTPATIENT)
Dept: GENERAL RADIOLOGY | Facility: CLINIC | Age: 75
DRG: 190 | End: 2020-06-28
Attending: INTERNAL MEDICINE
Payer: COMMERCIAL

## 2020-06-28 LAB
ANION GAP SERPL CALCULATED.3IONS-SCNC: 5 MMOL/L (ref 3–14)
BUN SERPL-MCNC: 25 MG/DL (ref 7–30)
CALCIUM SERPL-MCNC: 8.8 MG/DL (ref 8.5–10.1)
CHLORIDE SERPL-SCNC: 101 MMOL/L (ref 94–109)
CO2 SERPL-SCNC: 31 MMOL/L (ref 20–32)
CREAT SERPL-MCNC: 0.91 MG/DL (ref 0.66–1.25)
GFR SERPL CREATININE-BSD FRML MDRD: 80 ML/MIN/{1.73_M2}
GLUCOSE SERPL-MCNC: 115 MG/DL (ref 70–99)
LACTATE BLD-SCNC: 2.6 MMOL/L (ref 0.7–2)
NT-PROBNP SERPL-MCNC: 602 PG/ML (ref 0–1800)
POTASSIUM SERPL-SCNC: 4.3 MMOL/L (ref 3.4–5.3)
SODIUM SERPL-SCNC: 137 MMOL/L (ref 133–144)

## 2020-06-28 PROCEDURE — 40000275 ZZH STATISTIC RCP TIME EA 10 MIN

## 2020-06-28 PROCEDURE — 25000131 ZZH RX MED GY IP 250 OP 636 PS 637: Performed by: INTERNAL MEDICINE

## 2020-06-28 PROCEDURE — 25000125 ZZHC RX 250: Performed by: INTERNAL MEDICINE

## 2020-06-28 PROCEDURE — 94640 AIRWAY INHALATION TREATMENT: CPT

## 2020-06-28 PROCEDURE — 25000128 H RX IP 250 OP 636: Performed by: INTERNAL MEDICINE

## 2020-06-28 PROCEDURE — 80048 BASIC METABOLIC PNL TOTAL CA: CPT | Performed by: INTERNAL MEDICINE

## 2020-06-28 PROCEDURE — 83880 ASSAY OF NATRIURETIC PEPTIDE: CPT | Performed by: INTERNAL MEDICINE

## 2020-06-28 PROCEDURE — 71045 X-RAY EXAM CHEST 1 VIEW: CPT

## 2020-06-28 PROCEDURE — 25000132 ZZH RX MED GY IP 250 OP 250 PS 637: Performed by: INTERNAL MEDICINE

## 2020-06-28 PROCEDURE — 99233 SBSQ HOSP IP/OBS HIGH 50: CPT | Performed by: INTERNAL MEDICINE

## 2020-06-28 PROCEDURE — 97110 THERAPEUTIC EXERCISES: CPT | Mod: GP

## 2020-06-28 PROCEDURE — 12000000 ZZH R&B MED SURG/OB

## 2020-06-28 PROCEDURE — 97116 GAIT TRAINING THERAPY: CPT | Mod: GP

## 2020-06-28 PROCEDURE — 83605 ASSAY OF LACTIC ACID: CPT | Performed by: INTERNAL MEDICINE

## 2020-06-28 PROCEDURE — 36415 COLL VENOUS BLD VENIPUNCTURE: CPT | Performed by: INTERNAL MEDICINE

## 2020-06-28 RX ORDER — ROFLUMILAST 500 UG/1
500 TABLET ORAL DAILY
Status: DISCONTINUED | OUTPATIENT
Start: 2020-06-28 | End: 2020-06-28

## 2020-06-28 RX ORDER — ALBUTEROL SULFATE 90 UG/1
2 AEROSOL, METERED RESPIRATORY (INHALATION)
Status: DISCONTINUED | OUTPATIENT
Start: 2020-06-28 | End: 2020-07-01 | Stop reason: HOSPADM

## 2020-06-28 RX ORDER — PREDNISONE 20 MG/1
40 TABLET ORAL DAILY
Status: DISCONTINUED | OUTPATIENT
Start: 2020-06-28 | End: 2020-07-01

## 2020-06-28 RX ADMIN — BUDESONIDE 0.5 MG: 0.5 INHALANT RESPIRATORY (INHALATION) at 07:03

## 2020-06-28 RX ADMIN — IPRATROPIUM BROMIDE AND ALBUTEROL SULFATE 3 ML: .5; 3 SOLUTION RESPIRATORY (INHALATION) at 07:03

## 2020-06-28 RX ADMIN — ALBUTEROL SULFATE 2 PUFF: 90 AEROSOL, METERED RESPIRATORY (INHALATION) at 17:58

## 2020-06-28 RX ADMIN — ALBUTEROL SULFATE 2 PUFF: 90 AEROSOL, METERED RESPIRATORY (INHALATION) at 10:33

## 2020-06-28 RX ADMIN — UMECLIDINIUM 1 PUFF: 62.5 AEROSOL, POWDER ORAL at 10:33

## 2020-06-28 RX ADMIN — FOLIC ACID 1 MG: 1 TABLET ORAL at 09:02

## 2020-06-28 RX ADMIN — ALBUTEROL SULFATE 2 PUFF: 90 AEROSOL, METERED RESPIRATORY (INHALATION) at 21:59

## 2020-06-28 RX ADMIN — ENOXAPARIN SODIUM 40 MG: 40 INJECTION SUBCUTANEOUS at 12:29

## 2020-06-28 RX ADMIN — LEVOFLOXACIN 500 MG: 500 TABLET, FILM COATED ORAL at 09:02

## 2020-06-28 RX ADMIN — NICOTINE 1 PATCH: 21 PATCH, EXTENDED RELEASE TRANSDERMAL at 08:59

## 2020-06-28 RX ADMIN — PREDNISONE 40 MG: 20 TABLET ORAL at 14:36

## 2020-06-28 RX ADMIN — ALBUTEROL SULFATE 2 PUFF: 90 AEROSOL, METERED RESPIRATORY (INHALATION) at 14:36

## 2020-06-28 RX ADMIN — FLUTICASONE FUROATE AND VILANTEROL TRIFENATATE 1 PUFF: 200; 25 POWDER RESPIRATORY (INHALATION) at 10:33

## 2020-06-28 RX ADMIN — SENNOSIDES AND DOCUSATE SODIUM 2 TABLET: 8.6; 5 TABLET ORAL at 09:01

## 2020-06-28 RX ADMIN — ASPIRIN 81 MG: 81 TABLET ORAL at 09:02

## 2020-06-28 RX ADMIN — ROFLUMILAST 500 MCG: 500 TABLET ORAL at 12:29

## 2020-06-28 RX ADMIN — METHYLPREDNISOLONE SODIUM SUCCINATE 62.5 MG: 125 INJECTION, POWDER, FOR SOLUTION INTRAMUSCULAR; INTRAVENOUS at 09:01

## 2020-06-28 ASSESSMENT — ACTIVITIES OF DAILY LIVING (ADL)
ADLS_ACUITY_SCORE: 12

## 2020-06-28 NOTE — PLAN OF CARE
DATE & TIME: 6/27/2020; 1137-4743   Cognitive Concerns/ Orientation : A & O x 4   BEHAVIOR & AGGRESSION TOOL COLOR: Green   CIWA SCORE: N/A   ABNL VS/O2: VSS on 2 L NC (5 L baseline)   MOBILITY: SBA; ambulated in the halls  PAIN MANAGMENT: denies  DIET: Regular; tolerating well   BOWEL/BLADDER: BS active x 4; continent  ABNL LAB/BG: WBC=13.0; Hgb= 12.9; Lactic= 2.1 (MD notified; bolus given)   DRAIN/DEVICES: L. PIV/SL   TELEMETRY RHYTHM: N/A  SKIN: Scattered bruising; lashay LE bilaterally bilaterally; nicotine patch L. Arm   TESTS/PROCEDURES: N/A  D/C DAY/GOALS/PLACE: pending discharge  OTHER IMPORTANT INFO: Lungs diminished bilaterally; encouraged incentive spirometer and acepella use.       MD/RN ROUNDING SIGNED OFF D/E SHIFT: N/A  COMMIT TO SIT DONE AND SIGNED OFF: COMPLETE

## 2020-06-28 NOTE — PROGRESS NOTES
D: SW following for discharge planning. SILVIO reviewed chart. On Friday, RN STACI Zavala spoke with daughter Stephanie about TCU placement vs discharge to her house. PT is recommending TCU. Pt has [employer] insurance which requires a prior-auth.  I: SW attempted to call Stephanie. No answer and voicemail is full.   P: SILVIO will send referrals to [employer] facilities near Stephanie's home as discussed with KYLER SMALL Friday.     PAULINA Durham, LGSW  [phone]  [facility]

## 2020-06-28 NOTE — PLAN OF CARE
Discharge Planner PT   Patient plan for discharge: Agreeable to TCU, acknowledges would be hard to manage alone at home right now   Current status: Greeted patient supine in bed and agreeable to session. O2 sats 98% 4L O2 at rest. Patient IND with bed mobility. Engaged patient in sit <> stand without assistive device at A. Engaged patient in edge of bed leg exercises. Patient SOB and desats to 85% on 4L with 10 reps of sit <> stands, requiring seated rest, cued breathing and 2min to recover to >90% on 4L. Engaged patient in ambulation with FWW at [facility] for a total distance of 225ft with patient desating to 88% and SOB by return to room - seated rest required for recover to >90%. Concluded session with patient sitting up at edge of bed, all needs in reach.    Barriers to return to prior living situation: SOB/ALONSO & desating with activity when on 24 O2, LE weakness, impaired balance, high fall risk, decreased safety awareness, stairs (not assessed yet)  Recommendations for discharge: TCU  Rationale for recommendations: Patient is below baseline for functional mobility & would benefit from continued physical therapy in the setting of a TCU for improving functional mobility, LE strength and tolerance for functional activities in order to return to baseline of functioning.        Entered by: Laurie Edwards 06/28/2020 3:31 PM

## 2020-06-28 NOTE — CONSULTS
Pulmonary Medicine Consultation      Date of Admission: 6/24/2020  Primary Attending:  Jalil Raza MD  Consulting Physician: Rosario Adams MD    History:    Jha Tate is a 75 year old male who presents with the above chief complaint.  Patient reports he went to bed in his usual state of health the night prior to admission.  He awoke around 3 in the morning feeling acutely short of breath.  He has a history of COPD and is chronically maintained on 5 L of oxygen.  He continues to smoke half pack per day.  He has had a cough productive of yellow sputum for the past few weeks.  He was hospitalized in early April 2020 for COPD exacerbation with concurrent pneumonia, treated with antibiotics and steroids.  His wife is currently hospitalized for stroke, has been hospitalized for about a week and during that time his daughter has been staying with him.  He has been isolating in his home due to COVID19, other than his daughter has not had any outside contacts.  He denies any fevers or chills.  Denies any chest pain.  He has been mostly compliant with his home nebulizers, though misses an occasional dose when he forgets. Never seen pulmonary to manage his COPD. Has been on continuous oxygen for over 6 months now.       Review of Systems - A 10-system ROS is negative except for items mentioned above and in HPI.       Prior medical history:  Past Medical History:   Diagnosis Date     Bilateral lower extremity edema      COPD (chronic obstructive pulmonary disease) (H)      Dementia (H)      Gout        Past Surgical History:   Procedure Laterality Date     FRACTURE TX, ANKLE RT/LT      left- w/plate       Patient Active Problem List   Diagnosis     End-stage COPD on Home O2 5 LPM     Health Care Home     Influenza B     Chronic gout of multiple sites     Moderate Dementia      CAP (community acquired pneumonia)     Smoker     COPD exacerbation (H)       Social History     Social History     Socioeconomic  History     Marital status:      Spouse name: Not on file     Number of children: Not on file     Years of education: Not on file     Highest education level: Not on file   Occupational History     Not on file   Social Needs     Financial resource strain: Not on file     Food insecurity     Worry: Not on file     Inability: Not on file     Transportation needs     Medical: Not on file     Non-medical: Not on file   Tobacco Use     Smoking status: Current Every Day Smoker     Packs/day: 0.50     Years: 60.00     Pack years: 30.00     Types: Cigarettes     Smokeless tobacco: Never Used   Substance and Sexual Activity     Alcohol use: No     Comment: 4-5 drinks per month     Drug use: No     Sexual activity: Yes   Lifestyle     Physical activity     Days per week: Not on file     Minutes per session: Not on file     Stress: Not on file   Relationships     Social connections     Talks on phone: Not on file     Gets together: Not on file     Attends Hindu service: Not on file     Active member of club or organization: Not on file     Attends meetings of clubs or organizations: Not on file     Relationship status: Not on file     Intimate partner violence     Fear of current or ex partner: Not on file     Emotionally abused: Not on file     Physically abused: Not on file     Forced sexual activity: Not on file   Other Topics Concern     Not on file   Social History Narrative     Not on file         Family History  History reviewed. No pertinent family history.      Medications  No current outpatient medications on file.     Current Facility-Administered Medications Ordered in Epic   Medication Dose Route Frequency Last Rate Last Dose     acetaminophen (TYLENOL) Suppository 650 mg  650 mg Rectal Q4H PRN         acetaminophen (TYLENOL) tablet 650 mg  650 mg Oral Q4H PRN         albuterol (PROAIR HFA/PROVENTIL HFA/VENTOLIN HFA) 108 (90 Base) MCG/ACT inhaler 2 puff  2 puff Inhalation Q4H While awake   2 puff at  06/28/20 1033     albuterol (PROVENTIL) neb solution 2.5 mg  3 mL Nebulization Q2H PRN   2.5 mg at 06/27/20 0441     aspirin EC tablet 81 mg  81 mg Oral Daily   81 mg at 06/28/20 0902     bisacodyl (DULCOLAX) Suppository 10 mg  10 mg Rectal Daily PRN         calcium carbonate (TUMS) chewable tablet 1,000 mg  1,000 mg Oral Q4H PRN         carboxymethylcellulose PF (REFRESH PLUS) 0.5 % ophthalmic solution 1 drop  1 drop Both Eyes Q1H PRN         colchicine (COLCYRS) tablet 0.6 mg  0.6 mg Oral BID PRN         enoxaparin ANTICOAGULANT (LOVENOX) injection 40 mg  40 mg Subcutaneous Q24H   40 mg at 06/28/20 1229     fluticasone-vilanterol (BREO ELLIPTA) 200-25 MCG/INH inhaler 1 puff  1 puff Inhalation Daily   1 puff at 06/28/20 1033     folic acid (FOLVITE) tablet 1 mg  1 mg Oral Daily   1 mg at 06/28/20 0902     HOLD: All Oral Medications   Does not apply HOLD         levofloxacin (LEVAQUIN) tablet 500 mg  500 mg Oral Daily   500 mg at 06/28/20 0902     lidocaine (LMX4) cream   Topical Q1H PRN         lidocaine 1 % 0.1-1 mL  0.1-1 mL Other Q1H PRN         magnesium sulfate 2 g in water intermittent infusion  2 g Intravenous Daily PRN         magnesium sulfate 4 g in 100 mL sterile water (premade)  4 g Intravenous Q4H PRN         melatonin tablet 1 mg  1 mg Oral At Bedtime PRN         naloxone (NARCAN) injection 0.1-0.4 mg  0.1-0.4 mg Intravenous Q2 Min PRN         nicotine (NICODERM CQ) 21 MG/24HR 24 hr patch 1 patch  1 patch Transdermal Daily   1 patch at 06/28/20 0859     nicotine Patch in Place   Transdermal Q8H         nitroGLYcerin (NITROSTAT) sublingual tablet 0.4 mg  0.4 mg Sublingual Q5 Min PRN         No lozenges or gum should be given while patient on BIPAP/AVAPS/AVAPS AE   Does not apply Continuous PRN         ondansetron (ZOFRAN-ODT) ODT tab 4 mg  4 mg Oral Q6H PRN        Or     ondansetron (ZOFRAN) injection 4 mg  4 mg Intravenous Q6H PRN         polyethylene glycol (MIRALAX) Packet 17 g  17 g Oral Daily PRN          potassium chloride (KLOR-CON) Packet 20-40 mEq  20-40 mEq Oral or Feeding Tube Q2H PRN         potassium chloride 10 mEq in 100 mL intermittent infusion with 10 mg lidocaine  10 mEq Intravenous Q1H PRN         potassium chloride 10 mEq in 100 mL sterile water intermittent infusion (premix)  10 mEq Intravenous Q1H PRN         potassium chloride 20 mEq in 50 mL intermittent infusion  20 mEq Intravenous Q1H PRN         potassium chloride ER (KLOR-CON M) CR tablet 20-40 mEq  20-40 mEq Oral Q2H PRN         predniSONE (DELTASONE) tablet 40 mg  40 mg Oral Daily         prochlorperazine (COMPAZINE) injection 5 mg  5 mg Intravenous Q6H PRN        Or     prochlorperazine (COMPAZINE) tablet 5 mg  5 mg Oral Q6H PRN        Or     prochlorperazine (COMPAZINE) Suppository 12.5 mg  12.5 mg Rectal Q12H PRN         senna-docusate (SENOKOT-S/PERICOLACE) 8.6-50 MG per tablet 1 tablet  1 tablet Oral BID PRN        Or     senna-docusate (SENOKOT-S/PERICOLACE) 8.6-50 MG per tablet 2 tablet  2 tablet Oral BID PRN   2 tablet at 20 0901     sodium chloride (PF) 0.9% PF flush 3 mL  3 mL Intracatheter q1 min prn   3 mL at 20     sodium chloride (PF) 0.9% PF flush 3 mL  3 mL Intracatheter Q8H   3 mL at 20 1034     umeclidinium (INCRUSE ELLIPTA) 62.5 MCG/INH inhaler 1 puff  1 puff Inhalation Daily   1 puff at 20 1033     No current Epic-ordered outpatient medications on file.       Allergies   Allergen Reactions     Penicillins Swelling     Sulfa Drugs Swelling     Chantix [Varenicline Tartrate]      Hallucinations           Physical Examination:   Vitals:    20 0459 20 0703 20 0709 20 0719   BP:    (!) 141/70   BP Location:    Left arm   Pulse:       Resp:    16   Temp:    95.9  F (35.5  C)   TempSrc:    Oral   SpO2:  95% 97% 92%   Weight: 70.5 kg (155 lb 6.4 oz)        Body mass index is 21.67 kg/m .  Temp (24hrs), Av.5  F (36.4  C), Min:95.9  F (35.5  C), Max:98.3  F (36.8   C)        Constitutional:  Appears comfortable.  HENT:  mucous membranes moist.  Eyes: PERRLA, no icterus, no pallor.   Neck: No lymphadenopathy or thyromegaly, trachea midline, no carotid bruits.  Cardiovascular: Regular rate and rythym, no murmurs, rubs or gallops, no peripheral edema.  Respiratory/Chest: No use of accessory muscle, chest was clear to auscultation but diminished throughout.  Gastrointestinal: Abdomen was soft, non-tender, non-distended, no masses felt, no hepatosplenomegaly.  Musculoskeletal: No clubbing or cyanosis, full range of motion in all extremities.  Neurological: No focal motor or sensory deficits. DTR's are 2+ and symmetric.  Skin: No skin rash, hives, petechiae, or breakdown.      CMP  Recent Labs   Lab 06/28/20  0909 06/27/20  0748 06/25/20  0725 06/24/20  0543    135 139 137   POTASSIUM 4.3 3.9 4.0 3.8   CHLORIDE 101 99 104 105   CO2 31 33* 31 28   ANIONGAP 5 3 4 4   * 117* 147* 133*   BUN 25 22 12 13   CR 0.91 0.86 0.73 0.78   GFRESTIMATED 80 83 89 87   GFRESTBLACK >90 >90 >90 >90   SHEEBA 8.8 8.5 8.7 8.7   MAG  --  2.3  --   --    PHOS  --  3.9  --   --    PROTTOTAL  --   --   --  7.5   ALBUMIN  --  3.4  --  3.8   BILITOTAL  --   --   --  0.5   ALKPHOS  --   --   --  79   AST  --   --   --  17   ALT  --   --   --  16     CBC  Recent Labs   Lab 06/27/20  0748 06/25/20  0725 06/24/20  1028 06/24/20  0543   WBC 13.0* 8.0 8.8 11.9*   RBC 4.37* 4.06* 4.62 4.55   HGB 12.9* 11.8* 13.2* 13.3   HCT 39.3* 36.5* 42.1 41.6   MCV 90 90 91 91   MCH 29.5 29.1 28.6 29.2   MCHC 32.8 32.3 31.4* 32.0   RDW 14.1 14.1 14.4 14.4    104* 83* 94*     INRNo lab results found in last 7 days.  Arterial Blood Gas  Recent Labs   Lab 06/24/20  0545   O2PER 50     Recent Labs   Lab 06/25/20  2000   CULT Heavy growth  Normal oma         Diagnostic Studies:  Chest Radiology:   CT chest  IMPRESSION:   1. No pulmonary embolism demonstrated.  2. No thoracic aortic dissection or aneurysm.   3.  Unchanged lobar probable atelectasis of the left lower lobe.        Assessment/Plan:     Acute on chronic hypoxic/hypercapnic respiratory failure  AECOPD    Recommendations  -stop daliresp, will determine if indicated as an outpatient  -continue Breo/Incruse (unsure if insurance will cover as an outpatietn)  -continue duonebs qid  -wean O2 to maintain sats>90%  -will benefit from outpatient pulmonary rehab when able  -[facility] ([phone]) for management moving forward. Will need complete PFTs as well.   -stop IV steroids, prednisone 40mg daily, wean 10mg every 3 days until completed      Rosario Adams M.D.  Pulmonary, Critical Care and Sleep Medicine  [facility]   Pager: [phone]  Office:[phone]

## 2020-06-28 NOTE — PROGRESS NOTES
[facility]    Hospitalist Progress Note    Brief Summary:  Jah Tate is a 75 year-old male with history of oxygen dependent COPD, dementia, gout, chronic lower extremity edema who presents with shortness of breath. Admitted on 6/24/2020 for COPD exacerbation.     Assessment & Plan     Acute exacerbation of COPD  Acute on chronic hypoxic hypercapnic aspiratory failure  COVID-19 negative    Presented with acute shortness of breath found to be hypoxic on his baseline oxygen through nasal cannula, mildly elevated white blood cell count, chest x-ray no obvious infiltrate.  He was [place] in [facility] and started on BiPAP.  He is off BiPAP at this time.  I did ncrease his Solu-Medrol to 60 mg every 8 hours on 6/25/2020, can continue with DuoNeb nebulization every 4 hours, Pulmicort nebulization 0.5 mg twice daily, continue on levofloxacin.    Started on aggressive pulmonary toilet with incentive spirometry and flutter.  Despite increased Solu-Medrol and Spiriva and aggressive nebulization with DuoNeb is still short of breath.  CT scan of the chest PE protocol, negative for PE, dissection infiltrate or pneumothorax.  As patient shortness of breath get worse with nebulization I will stop all the nebulization.  Start him on albuterol inhaler every 4 hours, start him on Breo Ellipta and Incruse Ellipta.  I will start him on Daliresp 500 mg daily as well.  Continue with IV Solu-Medrol 60 mg twice daily.  Patient is on home O2 5 L all the time, he is on 2 to 3 L while in the hospital, and do get desaturated.  I will advised to keep his oxygen at his home dose of 5 L all the time.        Chronic thrombocytopenia  Stable, slightly improved as compared to yesterday.  TSH, B12, folate acid check are all in normal ranges in the past.  Follow-up as outpatient.       Gout  Prior to admission on colchicine PRN. No signs of flare at this time.      Chronic bilateral lower extremity edema  Dependent edema,  consistent with venous stasis.   - Elevate legs     Dementia  Limited metabolic work-up negative last admission (TSH, vitamin B12). Currently alert and oriented except for year.   - Maintain normal day/night, sleep wake cycles  - Minimize sedating/altering medications as able  - Treat separate conditions as detailed above/below      Tobacco dependence  - Nicotine patch ordered.  Cessation counseling given.      Shortness of breath worse after the nebulization does not like it.  Stop all nebulization DuoNeb and Pulmicort at this time.  Start him on Breo Ellipta and Incruse Ellipta  I will start him on Daliresp 500 mg daily  Mildly elevated lactic acid again 2.6 today.  This is secondary to nebulization and COPD rather than sepsis, as his blood pressure remained stable and perfusing well.  As he has some mild crackles will avoid any blood for boluses at this time.  Check x-ray to rule out any fluid overload and BNP.      DVT Prophylaxis: Enoxaparin (Lovenox) SQ  Code Status: Full Code    Disposition: Expected discharge in 1 to 2 days once his COPD improved.    Aidan Quintana MD  Text Page  (7am - 6pm)    Interval History   Patient shortness of breath get worse after using the nebulization, even after using the mouthpiece he feels that he is more congested and short of breath after that.  Does not like to be on nebulization anymore.  Has some mild cough.  No fever chills nausea vomiting at this time.  Diarrhea    Other significant event overnight    -Data reviewed today: I reviewed all new labs and imaging results over the last 24 hours. I personally reviewed no images or EKG's today.    Physical Exam   Temp: 95.9  F (35.5  C) Temp src: Oral BP: (!) 141/70   Heart Rate: 74 Resp: 16 SpO2: 92 % O2 Device: Nasal cannula Oxygen Delivery: 3 LPM  Vitals:    06/24/20 0540 06/25/20 0438 06/28/20 0459   Weight: 78.5 kg (173 lb 1 oz) 78.9 kg (173 lb 15.1 oz) 70.5 kg (155 lb 6.4 oz)     Vital Signs with Ranges  Temp:  [95.9  F  (35.5  C)-98.3  F (36.8  C)] 95.9  F (35.5  C)  Heart Rate:  [] 74  Resp:  [16-18] 16  BP: (125-141)/(70-75) 141/70  SpO2:  [92 %-97 %] 92 %  I/O last 3 completed shifts:  In: 120 [P.O.:120]  Out: 1000 [Urine:1000]    Constitutional: Awake alert and in no distress this morning.  Eyes: Lids and lashes normal, pupils equal, round and reactive to light, extra ocular muscles intact, sclera clear, conjunctiva normal  Respiratory: No increased work of breathing, improved air entry today, no obvious wheezing, but does have basal crackles today.  Cardiovascular: Normal apical impulse, regular rate and rhythm, normal S1 and S2, no S3 or S4, and no murmur noted  GI: No scars, normal bowel sounds, soft, non-distended, non-tender, no masses palpated, no hepatosplenomegally  Skin: no bruising or bleeding  Neurologic: No focal deficit    Medications     - MEDICATION INSTRUCTIONS -         albuterol  2 puff Inhalation Q4H While awake     aspirin  81 mg Oral Daily     enoxaparin ANTICOAGULANT  40 mg Subcutaneous Q24H     fluticasone-vilanterol  1 puff Inhalation Daily     folic acid  1 mg Oral Daily     levofloxacin  500 mg Oral Daily     methylPREDNISolone  62.5 mg Intravenous Q12H     nicotine  1 patch Transdermal Daily     nicotine   Transdermal Q8H     roflumilast  500 mcg Oral Daily     sodium chloride (PF)  3 mL Intracatheter Q8H     umeclidinium  1 puff Inhalation Daily       Data   Recent Labs   Lab 06/28/20  0909 06/27/20  0748 06/25/20  0725 06/24/20  1028 06/24/20  0543   WBC  --  13.0* 8.0 8.8 11.9*   HGB  --  12.9* 11.8* 13.2* 13.3   MCV  --  90 90 91 91   PLT  --  181 104* 83* 94*    135 139  --  137   POTASSIUM 4.3 3.9 4.0  --  3.8   CHLORIDE 101 99 104  --  105   CO2 31 33* 31  --  28   BUN 25 22 12  --  13   CR 0.91 0.86 0.73  --  0.78   ANIONGAP 5 3 4  --  4   SHEEBA 8.8 8.5 8.7  --  8.7   * 117* 147*  --  133*   ALBUMIN  --  3.4  --   --  3.8   PROTTOTAL  --   --   --   --  7.5   BILITOTAL  --    --   --   --  0.5   ALKPHOS  --   --   --   --  79   ALT  --   --   --   --  16   AST  --   --   --   --  17   TROPI  --   --   --   --  0.027       No results found for this or any previous visit (from the past 24 hour(s)).

## 2020-06-28 NOTE — PLAN OF CARE
DATE & TIME: 6/28/20@0630 Cognitive Concerns/ Orientation : A & O x 4   BEHAVIOR & AGGRESSION TOOL COLOR: Green   CIWA SCORE: N/A   ABNL VS/O2: VSS on 2 L NC (5 L baseline)   MOBILITY: SBA; ambulated in the halls  PAIN MANAGMENT: denies  DIET: Regular; tolerating well   BOWEL/BLADDER: BS active x 4; continent  ABNL LAB/BG: WBC=13.0; Hgb= 12.9; Lactic= 2.1 (MD notified; bolus given)   DRAIN/DEVICES: L. PIV/SL   TELEMETRY RHYTHM: N/A  SKIN: Scattered bruising; lashay LE bilaterally bilaterally; nicotine patch L. Arm   TESTS/PROCEDURES: N/A  D/C DAY/GOALS/PLACE: pending discharge  OTHER IMPORTANT INFO: SW/PT/CC following,respiratory consults also following, on scheduled nebs.

## 2020-06-28 NOTE — PLAN OF CARE
DATE & TIME: 6/28/20 2680-9485   Cognitive Concerns/ Orientation : A & O x 4   BEHAVIOR & AGGRESSION TOOL COLOR: Green   CIWA SCORE: N/A   ABNL VS/O2: VSS on 3-5L NC (5 L baseline), one episode SOB/desating to low 80s w/o obvious cause at am that resolved within 5 min (MD aware) pt overall feels his breathing improving   MOBILITY: SBA; ambulated in the hallsX2  PAIN MANAGMENT: denies  DIET: Regular; tolerating well   BOWEL/BLADDER: constipated, senna given; voiding, good UO  ABNL LAB/BG: Lactic= 2.3, MD aware CXR done  DRAIN/DEVICES: L. PIV/SL   TELEMETRY RHYTHM: N/A  SKIN: Scattered bruising; lashay LE bilaterally bilaterally; nicotine patch R Arm   TESTS/PROCEDURES: N/A  D/C DAY/GOALS/PLACE: improving but still pending respiratory status to TCU possible tomorrow  OTHER IMPORTANT INFO: seen by Pulmonologist and started on PO prednisone. SW/PT/CC following

## 2020-06-29 ENCOUNTER — MEDICAL CORRESPONDENCE (OUTPATIENT)
Dept: FAMILY MEDICINE | Facility: CLINIC | Age: 75
End: 2020-06-29

## 2020-06-29 ENCOUNTER — APPOINTMENT (OUTPATIENT)
Dept: PHYSICAL THERAPY | Facility: CLINIC | Age: 75
DRG: 190 | End: 2020-06-29
Payer: COMMERCIAL

## 2020-06-29 PROCEDURE — 12000000 ZZH R&B MED SURG/OB

## 2020-06-29 PROCEDURE — 97116 GAIT TRAINING THERAPY: CPT | Mod: GP | Performed by: PHYSICAL THERAPIST

## 2020-06-29 PROCEDURE — 25000128 H RX IP 250 OP 636: Performed by: INTERNAL MEDICINE

## 2020-06-29 PROCEDURE — 99232 SBSQ HOSP IP/OBS MODERATE 35: CPT | Performed by: INTERNAL MEDICINE

## 2020-06-29 PROCEDURE — 25000132 ZZH RX MED GY IP 250 OP 250 PS 637: Performed by: INTERNAL MEDICINE

## 2020-06-29 PROCEDURE — 25000131 ZZH RX MED GY IP 250 OP 636 PS 637: Performed by: INTERNAL MEDICINE

## 2020-06-29 RX ADMIN — ALBUTEROL SULFATE 2 PUFF: 90 AEROSOL, METERED RESPIRATORY (INHALATION) at 17:56

## 2020-06-29 RX ADMIN — LEVOFLOXACIN 500 MG: 500 TABLET, FILM COATED ORAL at 09:06

## 2020-06-29 RX ADMIN — FLUTICASONE FUROATE AND VILANTEROL TRIFENATATE 1 PUFF: 200; 25 POWDER RESPIRATORY (INHALATION) at 09:14

## 2020-06-29 RX ADMIN — PREDNISONE 40 MG: 20 TABLET ORAL at 09:06

## 2020-06-29 RX ADMIN — FOLIC ACID 1 MG: 1 TABLET ORAL at 09:06

## 2020-06-29 RX ADMIN — UMECLIDINIUM 1 PUFF: 62.5 AEROSOL, POWDER ORAL at 09:13

## 2020-06-29 RX ADMIN — NICOTINE 1 PATCH: 21 PATCH, EXTENDED RELEASE TRANSDERMAL at 09:09

## 2020-06-29 RX ADMIN — ENOXAPARIN SODIUM 40 MG: 40 INJECTION SUBCUTANEOUS at 11:50

## 2020-06-29 RX ADMIN — ASPIRIN 81 MG: 81 TABLET ORAL at 09:06

## 2020-06-29 RX ADMIN — ALBUTEROL SULFATE 2 PUFF: 90 AEROSOL, METERED RESPIRATORY (INHALATION) at 22:10

## 2020-06-29 RX ADMIN — ALBUTEROL SULFATE 2 PUFF: 90 AEROSOL, METERED RESPIRATORY (INHALATION) at 05:22

## 2020-06-29 RX ADMIN — ALBUTEROL SULFATE 2 PUFF: 90 AEROSOL, METERED RESPIRATORY (INHALATION) at 13:54

## 2020-06-29 RX ADMIN — ALBUTEROL SULFATE 2 PUFF: 90 AEROSOL, METERED RESPIRATORY (INHALATION) at 09:16

## 2020-06-29 ASSESSMENT — ACTIVITIES OF DAILY LIVING (ADL)
ADLS_ACUITY_SCORE: 14

## 2020-06-29 NOTE — PLAN OF CARE
Discharge Planner PT   Patient plan for discharge: None stated during session. Per chart, pt is agreeable to TCU.  Current status: Pt on 3 LPM throughout session. Pt completed bed mobility independently, transfers with FWW and SBA. Pt tolerated ambulation of 175 feet with FWW and CGA, frequent cues provided for upright posture. Noted SpO2 at 85% after ambulation with labored breathing, pt required 5 minute seated rest break for SpO2 on 3 LPM to improve to 88%.   Barriers to return to prior living situation: Decreased activity tolerance, SOB, Generalized weakness, Stairs  Recommendations for discharge: TCU  Rationale for recommendations: Patient will benefit from continued skilled PT intervention while IP and at TCU in order to progress independence and safety with functional mobility.       Entered by: Sonia Grove 06/29/2020 3:48 PM

## 2020-06-29 NOTE — PLAN OF CARE
DATE & TIME: 6/29/20 2593-4277   Cognitive Concerns/ Orientation : A&O x 4   BEHAVIOR & AGGRESSION TOOL COLOR: Green   CIWA SCORE: N/A   ABNL VS/O2: VSS on 2-5L NC (5 L baseline and needed when patient is performing activities, 2-3 LPM okay at rest). Weaning per pulmonologist. Currently O2 sats 95% at 3 LPM.   MOBILITY: x1 assist, GB/walker; encouraging ambulation.   PAIN MANAGMENT: denies  DIET: Regular; tolerating well   BOWEL/BLADDER: continent, uses bedside urinal.   ABNL LAB/BG: Lactic 2.6, MD aware. WBC 13.0. Hgb 12.9.   DRAIN/DEVICES: L. PIV SL   TELEMETRY RHYTHM: N/A  SKIN: Scattered bruising; lashay BLE; nicotine patch right shoulder.   TESTS/PROCEDURES: N/A  D/C DAY/GOALS/PLACE: pending TCU placement  OTHER IMPORTANT INFO: seen by Pulmonologist. SW/PT/CC following    MD/RN ROUNDING SIGNED OFF D/E SHIFT: No  COMMIT TO SIT DONE AND SIGNED OFF: yes

## 2020-06-29 NOTE — PROGRESS NOTES
"PULMONOLOGY PROGRESS NOTE    Date of Admission: 6/24/2020    CC/Reason for Hospital visit: copd, acute on chronic hypoxic resp failure  SUBJECTIVE      o2 weaning, gets short term benefit from rescue inhalers. Discussed o2 titration, use of long and short acting nebs and need for outpt pulm followup.      ROS: A Problem Pertinent review of systems was negative except for items noted in HPI.  Past Medical, Family, and Social/Substance History has been reviewed: No interval changes.   OBJECTIVE   Vital signs:  Temp: 98.1  F (36.7  C) Temp src: Oral BP: 135/76 Pulse: 76 Heart Rate: 78 Resp: 18 SpO2: 93 % O2 Device: Nasal cannula Oxygen Delivery: 4 LPM   Weight: 62.9 kg (138 lb 10.7 oz)  Estimated body mass index is 19.34 kg/m  as calculated from the following:    Height as of 4/6/20: 1.803 m (5' 11\").    Weight as of this encounter: 62.9 kg (138 lb 10.7 oz).        I/O last 3 completed shifts:  In: -   Out: 2345 [Urine:2345]      CONSTITUTIONAL/GENERAL APPEARANCE: Alert male. No Apparent Distress.  PSYCHIATRIC: Pleasant and appropriate mood and affect. Oriented x 3.  EARS, NOSE,THROAT,MOUTH: External ears and nose overall normal. Normal oral mucosa.   NECK: Neck appearance normal. No neck masses and the thyroid is not enlarged.   RESPIRATORY: Non-labored effort. Dec, no sig wheezing  CARDIOVASCULAR: S1, S2, regular rate and rhythm      LABORATORY ASSESSMENT    Arterial Blood Gas  Recent Labs   Lab 06/24/20  0545   O2PER 50     CBC  Recent Labs   Lab 06/27/20  0748 06/25/20  0725 06/24/20  1028 06/24/20  0543   WBC 13.0* 8.0 8.8 11.9*   RBC 4.37* 4.06* 4.62 4.55   HGB 12.9* 11.8* 13.2* 13.3   HCT 39.3* 36.5* 42.1 41.6   MCV 90 90 91 91   MCH 29.5 29.1 28.6 29.2   MCHC 32.8 32.3 31.4* 32.0   RDW 14.1 14.1 14.4 14.4    104* 83* 94*     BMP  Recent Labs   Lab 06/28/20  0909 06/27/20  0748 06/25/20  0725 06/24/20  0543    135 139 137   POTASSIUM 4.3 3.9 4.0 3.8   CHLORIDE 101 99 104 105   SHEEBA 8.8 8.5 8.7 8.7 "   CO2 31 33* 31 28   BUN 25 22 12 13   CR 0.91 0.86 0.73 0.78   * 117* 147* 133*     INRNo lab results found in last 7 days.   BNPNo lab results found in last 7 days.  VENOUS BLOOD GASES  Recent Labs   Lab 06/24/20  0545   PHV 7.25*   PCO2V 70*   PO2V 44   HCO3V 31*   JUANA 1.4         Additional labs and/or comments:     IMAGING        PFT & OTHER TESTING     7/2020 bedside spirometry: FEV1 ~ 41% predicted. Severe obstruction.    CTPE 6/26/2020:  FINDINGS: There is no pulmonary embolism. Continued lobar volume loss in the left lower lobe unchanged from previous. Stable moderate to severe emphysema. Similar elevated left hemidiaphragm. Moderate bronchial wall thickening again evident. The heart is not enlarged. Thoracic aorta is atherosclerotic without evidence of dissection or aneurysm. There is no pleural or pericardial effusion.  There is no pneumothorax. Adrenal glands are unremarkable where seen. Remainder of the visualized upper abdomen is unremarkable.                                                                      IMPRESSION:   1. No pulmonary embolism demonstrated.  2. No thoracic aortic dissection or aneurysm.   3. Unchanged lobar probable atelectasis of the left lower lobe.    ASSESSMENT / PLAN      Pulmonary Diagnoses:  Abnl CT/CXR R91.8  Abnl PFTs R94.2  COPD exacerb J44.1  Hypoxemia R09.02    ASSESSMENT : 75M, severe copd, o2 dep, still smoking. Seems to be slowly improving. Would benefit from outpt pulm followup, pulm rehab, and ongoing reinforcement and education about long vs short acting inhalers, goal o2 level ~ 88%, and titrating o2 based on activity.      PLAN:     no daliresp for now, will discuss as outpt and can slowly ramp up dose if needed    continue Breo/Incruse (unsure if insurance will cover as an outpt)    Agree with course of abx    continue duonebs qid    wean O2, goal saturation ~ 88%. Anticipate he will need a lower flow rate at rest and a higher flow rate on  exertion    outpatient pulmonary rehab when able    outpatient follow up with [facility] with Dr Adams ([phone]) for management moving forward. Will need complete PFTs as well.     prednisone 40mg daily starting 6/29, wean 10mg every 3 days until completed    Suggest echo    Smoking cessation strongly encouraged    Perhaps to tcu soon? Might be near his baseline.    Will follow      Varun Santana  [facility] / [facility]  Office: [phone]  Pager: [phone]

## 2020-06-29 NOTE — PROGRESS NOTES
SW:    I: SILVIO following for discharge planning. Pt was accepted at [facility] as long as would not require ongoing neb treatment at the facility. SILVIO confirmed with nursing that this is a need at baseline and pt WILL continue require this, and therefore [facility] cannot accept. SILVIO left messages with [facility]. Sent additional referral to [facility].    P: SW to follow. Pt to TCU once placement secured and [employer] auth obtained by accepting facility.    ADDENDUM: SW in contact with pt daughter, Padmini, this afternoon. SW updated Padmini on the above information and the barrier of requiring neb treatments at [facility] during the COVID pandemic. Padmini is considering having pt return to her home in [place] with home care services. SW reviewed PT note from 6/28 to give Padmini an idea of his current functional status and discuss that therapy is currently working with pt and can update again tomorrow with how he did today. Pt wife is currently at [facility] and Padmini plans to have her come stay at her place as well as discharge, anticipate her discharge date to be Friday 7/3. SILVIO inquired if this would feel overwhelming to have both of her parents at her home during recovery. Padmini believes that though is may be a bit stressful that she envisioned they would both stay with her anyway during pt spouse's recovery. Padmini shared that her mother has been recovering very well and will not have a great deal of need at discharge from [facility].    SW to connect with Padmini tomorrow ([phone]) to review progress in therapy and update on TCU referral status. If no TCU near her home that accept [employer] and neb treatments is able to accept, she will likely opt to have pt return to her home with home care services.    PAULINA Wesley, LICSW  [facility]  Daytime (8:00am-4:30pm): [phone]  After-Hours SILVIO Pager (4:30pm-11:30pm): [phone]

## 2020-06-29 NOTE — PLAN OF CARE
DATE & TIME: 6/28 from 1900 to 0730    Cognitive Concerns/ Orientation : A&O x 4   BEHAVIOR & AGGRESSION TOOL COLOR:Green  CIWA SCORE: N/A   ABNL VS/O2: VSS on 4 LNC saturating low 90's. At baseline ( 5LNC) at home. Wean oxygen to maintain > 90% per pulmonologist.   MOBILITY: Up x 1 assist with walker/GB  PAIN MANAGMENT: Denied  DIET: Regular  BOWEL/BLADDER: Continent, using bedside urinal   ABNL LAB/BG: Lactic 2.6, MD is aware, WBC 13, Hgb 12.9,   DRAIN/DEVICES: PIV saline lock  TELEMETRY RHYTHM: N/A  SKIN: scattered bruises. BLE red/lashay, dry.    TESTS/PROCEDURES: Chest X-ray done 6/28  D/C DAY/GOALS/PLACE: Anticipated discharge to TCU 1-2 days pending in progress for respiratory status improvement.   OTHER IMPORTANT INFO: Lung sound diminished throughout, ALONSO with minimal activities. Pulmonology consulted. Started Oral prednisone. PT/OT and SW following   MD/RN ROUNDING SIGNED OFF D/E SHIFT: N/A  COMMIT TO SIT DONE AND SIGNED OFF Yes

## 2020-06-29 NOTE — PROGRESS NOTES
[facility]    Hospitalist Progress Note    Brief Summary:  Jah Tate is a 75 year-old male with history of oxygen dependent COPD, dementia, gout, chronic lower extremity edema who presents with shortness of breath. Admitted on 6/24/2020 for COPD exacerbation.     Assessment & Plan     Acute exacerbation of COPD  Acute on chronic hypoxic hypercapnic aspiratory failure  COVID-19 negative    Presented with acute shortness of breath found to be hypoxic on his baseline oxygen through nasal cannula, mildly elevated white blood cell count, chest x-ray no obvious infiltrate.  He was admitted in [facility] and started on BiPAP.  He is off BiPAP at this time.  I did ncrease his Solu-Medrol to 60 mg every 8 hours on 6/25/2020, can continue with DuoNeb nebulization every 4 hours, Pulmicort nebulization 0.5 mg twice daily, continue on levofloxacin.    Started on aggressive pulmonary toilet with incentive spirometry and flutter.  Despite increased Solu-Medrol and Spiriva and aggressive nebulization with DuoNeb is still short of breath.  CT scan of the chest PE protocol, negative for PE, dissection infiltrate or pneumothorax.  As patient shortness of breath get worse with nebulization I will stop all the nebulization.  Start him on albuterol inhaler every 4 hours, start him on Breo Ellipta and Incruse Ellipta.    Daliresp was started on yesterday was discontinued by pulmonary, Solu-Medrol switch to oral prednisone 40 mg daily.  He uses 5 L oxygen at home, maintaining saturation above 92 on 3 L at rest, recommend to keep keep 2-3 at rest and bumped to 4-5 on exertion keep saturation 90 or above.      Chronic thrombocytopenia  Stable, slightly improved as compared to yesterday.  TSH, B12, folate acid check are all in normal ranges in the past.  Follow-up as outpatient.       Gout  Prior to admission on colchicine PRN. No signs of flare at this time.      Chronic bilateral lower extremity edema  Dependent edema,  consistent with venous stasis.   - Elevate legs     Dementia  Limited metabolic work-up negative last admission (TSH, vitamin B12). Currently alert and oriented except for year.   - Maintain normal day/night, sleep wake cycles  - Minimize sedating/altering medications as able  - Treat separate conditions as detailed above/below      Tobacco dependence  - Nicotine patch ordered.  Cessation counseling given.      Better today  Continue with Brio Ellipta Incruse Ellipta and oral prednisone  Albuterol inhaler 4 times a day  If remains stable possible discharge home in the morning.        DVT Prophylaxis: Enoxaparin (Lovenox) SQ  Code Status: Full Code    Disposition: Expected discharge in 1 to 2 days once his COPD improved.    Aidan Quintana MD  Text Page  (7am - 6pm)    Interval History   Patient somewhat better today as compared to yesterday.  Still has some shortness of breath.  No fever chills headache dizziness lightheadedness at this time.    Other significant event overnight    -Data reviewed today: I reviewed all new labs and imaging results over the last 24 hours. I personally reviewed no images or EKG's today.    Physical Exam   Temp: 98.8  F (37.1  C) Temp src: Oral BP: 129/66 Pulse: 76 Heart Rate: 71 Resp: 18 SpO2: 95 % O2 Device: Nasal cannula Oxygen Delivery: 4 LPM  Vitals:    06/25/20 0438 06/28/20 0459 06/29/20 0702   Weight: 78.9 kg (173 lb 15.1 oz) 70.5 kg (155 lb 6.4 oz) 62.9 kg (138 lb 10.7 oz)     Vital Signs with Ranges  Temp:  [97.5  F (36.4  C)-98.8  F (37.1  C)] 98.8  F (37.1  C)  Pulse:  [76] 76  Heart Rate:  [71-89] 71  Resp:  [18-20] 18  BP: (129-136)/(66-76) 129/66  SpO2:  [93 %-96 %] 95 %  I/O last 3 completed shifts:  In: -   Out: 2345 [Urine:2345]    Constitutional: Awake alert and in no distress this morning.  Eyes: Lids and lashes normal, pupils equal, round and reactive to light, extra ocular muscles intact, sclera clear, conjunctiva normal  Respiratory: No increased work of breathing,  improved air entry today, no obvious wheezing, but does have basal crackles today.  Cardiovascular: Normal apical impulse, regular rate and rhythm, normal S1 and S2, no S3 or S4, and no murmur noted  GI: No scars, normal bowel sounds, soft, non-distended, non-tender, no masses palpated, no hepatosplenomegally  Skin: no bruising or bleeding  Neurologic: No focal deficit    Medications     - MEDICATION INSTRUCTIONS -         albuterol  2 puff Inhalation Q4H While awake     aspirin  81 mg Oral Daily     enoxaparin ANTICOAGULANT  40 mg Subcutaneous Q24H     fluticasone-vilanterol  1 puff Inhalation Daily     folic acid  1 mg Oral Daily     levofloxacin  500 mg Oral Daily     nicotine  1 patch Transdermal Daily     nicotine   Transdermal Q8H     predniSONE  40 mg Oral Daily     sodium chloride (PF)  3 mL Intracatheter Q8H     umeclidinium  1 puff Inhalation Daily       Data   Recent Labs   Lab 06/28/20  0909 06/27/20  0748 06/25/20  0725 06/24/20  1028 06/24/20  0543   WBC  --  13.0* 8.0 8.8 11.9*   HGB  --  12.9* 11.8* 13.2* 13.3   MCV  --  90 90 91 91   PLT  --  181 104* 83* 94*    135 139  --  137   POTASSIUM 4.3 3.9 4.0  --  3.8   CHLORIDE 101 99 104  --  105   CO2 31 33* 31  --  28   BUN 25 22 12  --  13   CR 0.91 0.86 0.73  --  0.78   ANIONGAP 5 3 4  --  4   SHEEBA 8.8 8.5 8.7  --  8.7   * 117* 147*  --  133*   ALBUMIN  --  3.4  --   --  3.8   PROTTOTAL  --   --   --   --  7.5   BILITOTAL  --   --   --   --  0.5   ALKPHOS  --   --   --   --  79   ALT  --   --   --   --  16   AST  --   --   --   --  17   TROPI  --   --   --   --  0.027       No results found for this or any previous visit (from the past 24 hour(s)).

## 2020-06-30 ENCOUNTER — APPOINTMENT (OUTPATIENT)
Dept: CARDIOLOGY | Facility: CLINIC | Age: 75
DRG: 190 | End: 2020-06-30
Attending: INTERNAL MEDICINE
Payer: COMMERCIAL

## 2020-06-30 LAB
CREAT SERPL-MCNC: 0.83 MG/DL (ref 0.66–1.25)
GFR SERPL CREATININE-BSD FRML MDRD: 85 ML/MIN/{1.73_M2}
PLATELET # BLD AUTO: 145 10E9/L (ref 150–450)

## 2020-06-30 PROCEDURE — 25000132 ZZH RX MED GY IP 250 OP 250 PS 637: Performed by: INTERNAL MEDICINE

## 2020-06-30 PROCEDURE — 93306 TTE W/DOPPLER COMPLETE: CPT | Mod: 26 | Performed by: INTERNAL MEDICINE

## 2020-06-30 PROCEDURE — 82565 ASSAY OF CREATININE: CPT | Performed by: INTERNAL MEDICINE

## 2020-06-30 PROCEDURE — 85049 AUTOMATED PLATELET COUNT: CPT | Performed by: INTERNAL MEDICINE

## 2020-06-30 PROCEDURE — 99232 SBSQ HOSP IP/OBS MODERATE 35: CPT | Performed by: INTERNAL MEDICINE

## 2020-06-30 PROCEDURE — 93306 TTE W/DOPPLER COMPLETE: CPT

## 2020-06-30 PROCEDURE — 36415 COLL VENOUS BLD VENIPUNCTURE: CPT | Performed by: INTERNAL MEDICINE

## 2020-06-30 PROCEDURE — 25500064 ZZH RX 255 OP 636: Performed by: INTERNAL MEDICINE

## 2020-06-30 PROCEDURE — 25000131 ZZH RX MED GY IP 250 OP 636 PS 637: Performed by: INTERNAL MEDICINE

## 2020-06-30 PROCEDURE — 25000128 H RX IP 250 OP 636: Performed by: INTERNAL MEDICINE

## 2020-06-30 PROCEDURE — 12000000 ZZH R&B MED SURG/OB

## 2020-06-30 RX ADMIN — ALBUTEROL SULFATE 2 PUFF: 90 AEROSOL, METERED RESPIRATORY (INHALATION) at 06:07

## 2020-06-30 RX ADMIN — ASPIRIN 81 MG: 81 TABLET ORAL at 08:01

## 2020-06-30 RX ADMIN — UMECLIDINIUM 1 PUFF: 62.5 AEROSOL, POWDER ORAL at 07:49

## 2020-06-30 RX ADMIN — PREDNISONE 40 MG: 20 TABLET ORAL at 08:01

## 2020-06-30 RX ADMIN — ALBUTEROL SULFATE 2 PUFF: 90 AEROSOL, METERED RESPIRATORY (INHALATION) at 21:59

## 2020-06-30 RX ADMIN — ENOXAPARIN SODIUM 40 MG: 40 INJECTION SUBCUTANEOUS at 12:46

## 2020-06-30 RX ADMIN — NICOTINE 1 PATCH: 21 PATCH, EXTENDED RELEASE TRANSDERMAL at 08:04

## 2020-06-30 RX ADMIN — ALBUTEROL SULFATE 2 PUFF: 90 AEROSOL, METERED RESPIRATORY (INHALATION) at 18:07

## 2020-06-30 RX ADMIN — SENNOSIDES AND DOCUSATE SODIUM 2 TABLET: 8.6; 5 TABLET ORAL at 08:18

## 2020-06-30 RX ADMIN — LEVOFLOXACIN 500 MG: 500 TABLET, FILM COATED ORAL at 08:01

## 2020-06-30 RX ADMIN — ALBUTEROL SULFATE 2 PUFF: 90 AEROSOL, METERED RESPIRATORY (INHALATION) at 10:20

## 2020-06-30 RX ADMIN — HUMAN ALBUMIN MICROSPHERES AND PERFLUTREN 9 ML: 10; .22 INJECTION, SOLUTION INTRAVENOUS at 15:00

## 2020-06-30 RX ADMIN — ALBUTEROL SULFATE 2 PUFF: 90 AEROSOL, METERED RESPIRATORY (INHALATION) at 13:31

## 2020-06-30 RX ADMIN — FLUTICASONE FUROATE AND VILANTEROL TRIFENATATE 1 PUFF: 200; 25 POWDER RESPIRATORY (INHALATION) at 07:50

## 2020-06-30 RX ADMIN — FOLIC ACID 1 MG: 1 TABLET ORAL at 08:01

## 2020-06-30 ASSESSMENT — ACTIVITIES OF DAILY LIVING (ADL)
ADLS_ACUITY_SCORE: 14
ADLS_ACUITY_SCORE: 15
ADLS_ACUITY_SCORE: 14
ADLS_ACUITY_SCORE: 14
ADLS_ACUITY_SCORE: 15
ADLS_ACUITY_SCORE: 15

## 2020-06-30 NOTE — PROGRESS NOTES
"PULMONOLOGY PROGRESS NOTE    Date of Admission: 6/24/2020    CC/Reason for Hospital visit: copd, acute on chronic hypoxic resp failure  SUBJECTIVE      o2 weaning, gets short term benefit from rescue inhalers. Moving around some, discharge tentatively planned for tomorrow.      ROS: A Problem Pertinent review of systems was negative except for items noted in HPI.  Past Medical, Family, and Social/Substance History has been reviewed: No interval changes.   OBJECTIVE   Vital signs:  Temp: 97.4  F (36.3  C) Temp src: Oral BP: (!) 142/66 Pulse: 79 Heart Rate: 72 Resp: 24 SpO2: 92 % O2 Device: Nasal cannula Oxygen Delivery: 3 LPM   Weight: 61.7 kg (136 lb 0.4 oz)  Estimated body mass index is 18.97 kg/m  as calculated from the following:    Height as of 4/6/20: 1.803 m (5' 11\").    Weight as of this encounter: 61.7 kg (136 lb 0.4 oz).        I/O last 3 completed shifts:  In: 960 [P.O.:960]  Out: 1500 [Urine:1500]      CONSTITUTIONAL/GENERAL APPEARANCE: Alert male. No Apparent Distress.  PSYCHIATRIC: Pleasant and appropriate mood and affect. Oriented x 3.  EARS, NOSE,THROAT,MOUTH: External ears and nose overall normal. Normal oral mucosa.   NECK: Neck appearance normal. No neck masses and the thyroid is not enlarged.   RESPIRATORY: Non-labored effort. Dec, no sig wheezing  CARDIOVASCULAR: S1, S2, regular rate and rhythm      LABORATORY ASSESSMENT    Arterial Blood Gas  Recent Labs   Lab 06/24/20  0545   O2PER 50     CBC  Recent Labs   Lab 06/30/20  0810 06/27/20  0748 06/25/20  0725 06/24/20  1028 06/24/20  0543   WBC  --  13.0* 8.0 8.8 11.9*   RBC  --  4.37* 4.06* 4.62 4.55   HGB  --  12.9* 11.8* 13.2* 13.3   HCT  --  39.3* 36.5* 42.1 41.6   MCV  --  90 90 91 91   MCH  --  29.5 29.1 28.6 29.2   MCHC  --  32.8 32.3 31.4* 32.0   RDW  --  14.1 14.1 14.4 14.4   * 181 104* 83* 94*     BMP  Recent Labs   Lab 06/30/20  0810 06/28/20  0909 06/27/20  0748 06/25/20  0725 06/24/20  0543   NA  --  137 135 139 137   POTASSIUM  " --  4.3 3.9 4.0 3.8   CHLORIDE  --  101 99 104 105   SHEEBA  --  8.8 8.5 8.7 8.7   CO2  --  31 33* 31 28   BUN  --  25 22 12 13   CR 0.83 0.91 0.86 0.73 0.78   GLC  --  115* 117* 147* 133*     INRNo lab results found in last 7 days.   BNPNo lab results found in last 7 days.  VENOUS BLOOD GASES  Recent Labs   Lab 06/24/20  0545   PHV 7.25*   PCO2V 70*   PO2V 44   HCO3V 31*   JUANA 1.4         Additional labs and/or comments:     IMAGING        PFT & OTHER TESTING     7/2020 bedside spirometry: FEV1 ~ 41% predicted. Severe obstruction.    CTPE 6/26/2020:  FINDINGS: There is no pulmonary embolism. Continued lobar volume loss in the left lower lobe unchanged from previous. Stable moderate to severe emphysema. Similar elevated left hemidiaphragm. Moderate bronchial wall thickening again evident. The heart is not enlarged. Thoracic aorta is atherosclerotic without evidence of dissection or aneurysm. There is no pleural or pericardial effusion.  There is no pneumothorax. Adrenal glands are unremarkable where seen. Remainder of the visualized upper abdomen is unremarkable.                                                                      IMPRESSION:   1. No pulmonary embolism demonstrated.  2. No thoracic aortic dissection or aneurysm.   3. Unchanged lobar probable atelectasis of the left lower lobe.    ASSESSMENT / PLAN      Pulmonary Diagnoses:  Abnl CT/CXR R91.8  Abnl PFTs R94.2  COPD exacerb J44.1  Hypoxemia R09.02    ASSESSMENT : 75M, severe copd, o2 dep, still smoking. Seems to be slowly improving. Would benefit from outpt pulm followup, pulm rehab, and ongoing reinforcement and education about long vs short acting inhalers, goal o2 level ~ 88%, and titrating o2 based on activity.      PLAN:     no daliresp for now, will discuss as outpt and can slowly ramp up dose if needed    continue Breo/Incruse (unsure if insurance will cover as an outpt) on discharge    Agree with course of abx    continue duonebs qid prn    wean  O2, goal saturation ~ 88%. Anticipate he will need a lower flow rate at rest and a higher flow rate on exertion    outpatient pulmonary rehab when able    outpatient follow up with [facility], with Dr Adams ([phone]) for management moving forward. Will need complete PFTs as well.     prednisone 40mg daily starting 6/29, wean 10mg every 3 days until completed    Echo pending for tomorrow    Smoking cessation strongly encouraged    Agree with plans for discharge tomorrow. No further suggestions. pls call if needed.      Varun Santana  [facility] / [facility]  Office: [phone]  Pager: [phone]

## 2020-06-30 NOTE — PLAN OF CARE
DATE & TIME: 6/30/2020 3004-2438    Cognitive Concerns/ Orientation : A & O x 4, forgetful at times   BEHAVIOR & AGGRESSION TOOL COLOR: Green  CIWA SCORE: N/a   ABNL VS/O2: VSS on 3L NC  MOBILITY: A x 1 W/GB  PAIN MANAGMENT: Denies  DIET: Regular  BOWEL/BLADDER: Continent, urinal at bedside  ABNL LAB/BG: N/a  DRAIN/DEVICES: PIV SL  TELEMETRY RHYTHM: N/a  SKIN: Scattered bruising, lashay LE ROSALIA, nicotine patch R shoulder  TESTS/PROCEDURES: N/a  D/C DAY/GOALS/PLACE: TBD  OTHER IMPORTANT INFO: SW/PT following. Lung sounds diminished with crackles ROSALIA in lower bases.   MD/RN ROUNDING SIGNED OFF D/E SHIFT: N/a  COMMIT TO SIT DONE AND SIGNED OFF Yes

## 2020-06-30 NOTE — PLAN OF CARE
Cognitive Concerns/ Orientation : A&Ox4, forgetful at time. Cooperative with cares. Anxious about breathing.   BEHAVIOR & AGGRESSION TOOL COLOR: Green   CIWA SCORE: N/A   ABNL VS/O2: VSS, on 2-5L NC (5 L baseline and needed when patient is performing activities, 2-3 LPM okay at rest). Weaning per pulmonologist. Currently O2 sats 95% at 3 LPM. Scheduled Albuterol inhaler Q4.   MOBILITY: 1PA, GB/walker; encouraging ambulation. Pt walked with PT this evening. SaO2 in the mid 80's when ambulating. Pt needed time to recover after walk sitting at edge of bed.   PAIN MANAGMENT: Denies  DIET: Regular, god appetite and oral intake.   BOWEL/BLADDER: Continent, uses bedside urinal. No BM this evening. Pt reports every 2-3 days normal.  ABNL LAB/BG: No new labs this AM.   DRAIN/DEVICES: LUE PIV SL, WDL   TELEMETRY RHYTHM: N/A  SKIN: Scattered bruising; lashay LE bilaterally bilaterally; nicotine patch right shoulder.   TESTS/PROCEDURES: N/A  D/C DAY/GOALS/PLACE: Improving but still pending respiratory status to TCU  OTHER IMPORTANT INFO: Seen by Pulmonologist. SW/PT/CC following. Family updated on days. Rounded on freq.

## 2020-06-30 NOTE — PLAN OF CARE
DATE & TIME: 6/30/2020 7-3 pm   Cognitive Concerns/ Orientation : A & O x 4, forgetful. Labile mood. Anxious.   BEHAVIOR & AGGRESSION TOOL COLOR: Green  CIWA SCORE: N/A  ABNL VS/O2: VSS on 2-3L NC @ rest and 4-5 w/ activity.   MOBILITY: SBA w/ gait belt and walker.   PAIN MANAGMENT: Denies.  DIET: Regular, poor appetite.   BOWEL/BLADDER: Continent, urinal at bedside. C/O of constiaption and no BM for 3 days. PRN Senokot given pt has had 3 BMs.   ABNL LAB/BG: Platelet 145.  DRAIN/DEVICES: PIV SL.  TELEMETRY RHYTHM: N/A  SKIN: Flushed face/dusky skin. Scattered bruising, BLE lashay, nicotine patch L shoulder.   TESTS/PROCEDURES: Echo completed.   D/C DAY/GOALS/PLACE: Tomorrow.   OTHER IMPORTANT INFO: LS diminished. Dyspnea noted w/ any activity. Scheduled albuterol inhaler. Refuses nebulizer.   MD/RN ROUNDING SIGNED OFF D/E SHIFT: Yes  COMMIT TO SIT DONE AND SIGNED OFF Yes

## 2020-06-30 NOTE — PROGRESS NOTES
[facility]    Hospitalist Progress Note    Brief Summary:  Jah Tate is a 75 year-old male with history of oxygen dependent COPD, dementia, gout, chronic lower extremity edema who presents with shortness of breath. Admitted on 6/24/2020 for COPD exacerbation.     Assessment & Plan     Acute exacerbation of COPD  Acute on chronic hypoxic hypercapnic aspiratory failure  COVID-19 negative    Presented with acute shortness of breath found to be hypoxic on his baseline oxygen through nasal cannula, mildly elevated white blood cell count, chest x-ray no obvious infiltrate.  [place] [facility] [place].  He is off BiPAP at this time.  I did ncrease his Solu-Medrol to 60 mg every 8 hours on 6/25/2020, can continue with DuoNeb nebulization every 4 hours, Pulmicort nebulization 0.5 mg twice daily, continue on levofloxacin.    Started on aggressive pulmonary toilet with incentive spirometry and flutter.  Despite increased Solu-Medrol and Spiriva and aggressive nebulization with DuoNeb is still short of breath.  CT scan of the chest PE protocol, negative for PE, dissection infiltrate or pneumothorax.  As patient shortness of breath get worse with nebulization I  stop all the nebulization.  Start him on albuterol inhaler every 4 hours, start him on Breo Ellipta and Incruse Ellipta.    Daliresp was started BUT  was discontinued by pulmonary, Solu-Medrol switch to oral prednisone 40 mg daily.  He uses 5 L oxygen at home, maintaining saturation above 92 on 3 L at rest, recommend to keep keep 2-3 at rest and bumped to 4-5 on exertion keep saturation 90 or above.    Still short of breath but no obvious wheezing.  I think that may be his new baseline, or may be at his baseline.  Pulmonary may need to follow him up and adjust his medication if needed.  He is not feeling comfortable to go home at this time.      Chronic thrombocytopenia  Stable,   TSH, B12, folate acid check are all in normal  ranges in the past.  Follow-up as outpatient.       Gout  Prior to admission on colchicine PRN. No signs of flare at this time.      Chronic bilateral lower extremity edema  Dependent edema, consistent with venous stasis.   - Elevate legs     Dementia  Limited metabolic work-up negative last admission (TSH, vitamin B12). Currently alert and oriented except for year.   - Maintain normal day/night, sleep wake cycles  - Minimize sedating/altering medications as able  - Treat separate conditions as detailed above/below      Tobacco dependence  - Nicotine patch ordered.  Cessation counseling given.        Continue with Brio Ellipta Incruse Ellipta and oral prednisone  Albuterol inhaler 4 times a day  Pulmonary need to follow to adjust his medication if needed.        DVT Prophylaxis: Enoxaparin (Lovenox) SQ  Code Status: Full Code    Disposition: Expected discharge tomorrow.    Aidan Quintana MD  Text Page  (7am - 6pm)    Interval History   And again short of breath this morning, he said he was unable to breathe this morning when you wake up.  Even after the inhaler his breathing does not get better.  He does not like to be on nebulization as is making his breathing worse.  Coughing up some phlegm.  No fever chills nausea vomiting at this time.    No other significant event overnight    -Data reviewed today: I reviewed all new labs and imaging results over the last 24 hours. I personally reviewed no images or EKG's today.    Physical Exam   Temp: 97.4  F (36.3  C) Temp src: Oral BP: (!) 142/66 Pulse: 79 Heart Rate: 72 Resp: 24 SpO2: 92 % O2 Device: Nasal cannula Oxygen Delivery: 3 LPM  Vitals:    06/28/20 0459 06/29/20 0702 06/30/20 0635   Weight: 70.5 kg (155 lb 6.4 oz) 62.9 kg (138 lb 10.7 oz) 61.7 kg (136 lb 0.4 oz)     Vital Signs with Ranges  Temp:  [97.4  F (36.3  C)-98.5  F (36.9  C)] 97.4  F (36.3  C)  Pulse:  [79] 79  Heart Rate:  [72-73] 72  Resp:  [18-24] 24  BP: (122-142)/(57-66) 142/66  SpO2:  [89 %-97 %] 92  %  I/O last 3 completed shifts:  In: 960 [P.O.:960]  Out: 1500 [Urine:1500]    Constitutional: Awake alert and in no distress this morning.  Eyes: Lids and lashes normal, pupils equal, round and reactive to light, extra ocular muscles intact, sclera clear, conjunctiva normal  Respiratory: No increased work of breathing, improved air entry today, no obvious wheezing, but does have basal crackles today.  Cardiovascular: Normal apical impulse, regular rate and rhythm, normal S1 and S2, no S3 or S4, and no murmur noted  GI: No scars, normal bowel sounds, soft, non-distended, non-tender, no masses palpated, no hepatosplenomegally  Skin: no bruising or bleeding  Neurologic: No focal deficit    Medications     - MEDICATION INSTRUCTIONS -         albuterol  2 puff Inhalation Q4H While awake     aspirin  81 mg Oral Daily     enoxaparin ANTICOAGULANT  40 mg Subcutaneous Q24H     fluticasone-vilanterol  1 puff Inhalation Daily     folic acid  1 mg Oral Daily     levofloxacin  500 mg Oral Daily     nicotine  1 patch Transdermal Daily     nicotine   Transdermal Q8H     predniSONE  40 mg Oral Daily     sodium chloride (PF)  3 mL Intracatheter Q8H     umeclidinium  1 puff Inhalation Daily       Data   Recent Labs   Lab 06/30/20  0810 06/28/20  0909 06/27/20  0748 06/25/20  0725 06/24/20  1028 06/24/20  0543   WBC  --   --  13.0* 8.0 8.8 11.9*   HGB  --   --  12.9* 11.8* 13.2* 13.3   MCV  --   --  90 90 91 91   *  --  181 104* 83* 94*   NA  --  137 135 139  --  137   POTASSIUM  --  4.3 3.9 4.0  --  3.8   CHLORIDE  --  101 99 104  --  105   CO2  --  31 33* 31  --  28   BUN  --  25 22 12  --  13   CR 0.83 0.91 0.86 0.73  --  0.78   ANIONGAP  --  5 3 4  --  4   SHEEBA  --  8.8 8.5 8.7  --  8.7   GLC  --  115* 117* 147*  --  133*   ALBUMIN  --   --  3.4  --   --  3.8   PROTTOTAL  --   --   --   --   --  7.5   BILITOTAL  --   --   --   --   --  0.5   ALKPHOS  --   --   --   --   --  79   ALT  --   --   --   --   --  16   AST  --    --   --   --   --  17   TROPI  --   --   --   --   --  0.027       No results found for this or any previous visit (from the past 24 hour(s)).

## 2020-07-01 VITALS
RESPIRATION RATE: 18 BRPM | SYSTOLIC BLOOD PRESSURE: 113 MMHG | OXYGEN SATURATION: 95 % | BODY MASS INDEX: 18.36 KG/M2 | TEMPERATURE: 98.3 F | WEIGHT: 131.61 LBS | DIASTOLIC BLOOD PRESSURE: 68 MMHG | HEART RATE: 79 BPM

## 2020-07-01 PROCEDURE — 25000131 ZZH RX MED GY IP 250 OP 636 PS 637: Performed by: HOSPITALIST

## 2020-07-01 PROCEDURE — 99239 HOSP IP/OBS DSCHRG MGMT >30: CPT | Performed by: HOSPITALIST

## 2020-07-01 PROCEDURE — 25000132 ZZH RX MED GY IP 250 OP 250 PS 637: Performed by: INTERNAL MEDICINE

## 2020-07-01 RX ORDER — PREDNISONE 10 MG/1
TABLET ORAL
Qty: 15 TABLET | Refills: 0 | Status: SHIPPED | OUTPATIENT
Start: 2020-07-02 | End: 2020-08-11

## 2020-07-01 RX ORDER — IPRATROPIUM BROMIDE AND ALBUTEROL SULFATE 2.5; .5 MG/3ML; MG/3ML
1 SOLUTION RESPIRATORY (INHALATION) EVERY 6 HOURS PRN
Qty: 40 VIAL | Refills: 3
Start: 2020-07-01 | End: 2020-07-07

## 2020-07-01 RX ORDER — NICOTINE 21 MG/24HR
1 PATCH, TRANSDERMAL 24 HOURS TRANSDERMAL DAILY
Qty: 14 PATCH | Refills: 0 | Status: SHIPPED | OUTPATIENT
Start: 2020-07-01 | End: 2020-08-11 | Stop reason: DRUGHIGH

## 2020-07-01 RX ADMIN — NICOTINE 1 PATCH: 21 PATCH, EXTENDED RELEASE TRANSDERMAL at 09:02

## 2020-07-01 RX ADMIN — FOLIC ACID 1 MG: 1 TABLET ORAL at 09:00

## 2020-07-01 RX ADMIN — ALBUTEROL SULFATE 2 PUFF: 90 AEROSOL, METERED RESPIRATORY (INHALATION) at 09:58

## 2020-07-01 RX ADMIN — ALBUTEROL SULFATE 2 PUFF: 90 AEROSOL, METERED RESPIRATORY (INHALATION) at 05:58

## 2020-07-01 RX ADMIN — FLUTICASONE FUROATE AND VILANTEROL TRIFENATATE 1 PUFF: 200; 25 POWDER RESPIRATORY (INHALATION) at 09:01

## 2020-07-01 RX ADMIN — UMECLIDINIUM 1 PUFF: 62.5 AEROSOL, POWDER ORAL at 09:01

## 2020-07-01 RX ADMIN — PREDNISONE 30 MG: 10 TABLET ORAL at 09:00

## 2020-07-01 RX ADMIN — LEVOFLOXACIN 500 MG: 500 TABLET, FILM COATED ORAL at 09:00

## 2020-07-01 RX ADMIN — ASPIRIN 81 MG: 81 TABLET ORAL at 09:00

## 2020-07-01 ASSESSMENT — ACTIVITIES OF DAILY LIVING (ADL)
ADLS_ACUITY_SCORE: 14

## 2020-07-01 NOTE — PLAN OF CARE
Physical Therapy Discharge Summary    Reason for therapy discharge:    Discharged to home with home therapy.    Progress towards therapy goal(s). See goals on Care Plan in [facility] electronic health record for goal details.  Goals not met.  Barriers to achieving goals:   discharge from facility.    Therapy recommendation(s):    Continued therapy is recommended.  Rationale/Recommendations:  TCU was recommended; as pt discharging home, recommend SBA for mobility with walker and home PT.

## 2020-07-01 NOTE — PLAN OF CARE
Alert and oriented x 4. Denies pain. 2-3 L oxygen at rest. 3-4 L oxygen with activity. ALONSO. LS diminished.  Plan to discharge home tomorrow with daughter at 1030. Nicotine patch on LUE

## 2020-07-01 NOTE — PROGRESS NOTES
Please see CTS RN consult note from 6/26/20 for more information.  Pt is discharging home today.  Both PCP and Pulmonary appointments have been made.  SW is finalizing discharge plan and pt will be leaving at 10:30 this AM.

## 2020-07-01 NOTE — PLAN OF CARE
DATE & TIME: 1900-0730 6/30/2020    Cognitive Concerns/ Orientation : A/Ox4; forgetful hx dementia    BEHAVIOR & AGGRESSION TOOL COLOR: green   CIWA SCORE:    ABNL VS/O2: VAA. 2L NC @ 93%   MOBILITY: Ax1  PAIN MANAGMENT: denies at this time   DIET: regular   BOWEL/BLADDER: urinal at bedside   ABNL LAB/BG:   DRAIN/DEVICES: saline locked   TELEMETRY RHYTHM:   SKIN: lashay; dry; flaky   TESTS/PROCEDURES:   D/C DAY/GOALS/PLACE: Discharge tomorrow about 1030 am; daughter supposedly coming to here discharge instructions   OTHER IMPORTANT INFO: LS diminished.   MD/RN ROUNDING SIGNED OFF D/E SHIFT: NA   COMMIT TO SIT DONE AND SIGNED OFF YES

## 2020-07-01 NOTE — DISCHARGE SUMMARY
[facility]  Hospitalist Discharge Summary      Date of Admission:  6/24/2020  Date of Discharge:  7/1/2020 10:56 AM  Discharging Provider: Anselmo Carrero MD      Discharge Diagnoses   1. Acute exacerbation of COPD  2. Acute on chronic hypoxic hypercapnic aspiratory failure  3. COVID-19 negative  4. Chronic thrombocytopenia  5. Gout  6. Chronic bilateral lower extremity edema  7. Dementia  8. Tobacco dependence  9. Moderate aortic stenosis    Follow-ups Needed After Discharge   Follow-up Appointments     Follow-up and recommended labs and tests       Follow up with primary care provider, Dr Eric Johnson, as scheduled   for you on Tuesday 7/7/20 at 3:45PM, hospital follow- up.    Please arrive at 3:30PM.  Call clinic ([phone]) from the parking lot   to let them know you have arrived.  Please wear a mask.         Follow-up and recommended labs and tests       Pulmonology, Dr Adams for you on Thursday 8/13/20 at 10:45AM. You will   have PFTs done.  Their address is:  [facility]  Address: [address] [place] [address]  Phone: [phone]    You will be sent an informational packet.           Unresulted Labs Ordered in the Past 30 Days of this Admission     No orders found from 5/25/2020 to 6/25/2020.      These results will be followed up by NA    Discharge Disposition   Discharged to home  Condition at discharge: Stable      Hospital Course   Acute exacerbation of COPD  Acute on chronic hypoxic hypercapnic aspiratory failure  COVID-19 negative     Presented with acute shortness of breath found to be hypoxic on his baseline oxygen through nasal cannula, mildly elevated white blood cell count, chest x-ray no obvious infiltrate.  [place] [facility] [place]AP.  He is off BiPAP at this time.  I did ncrease his Solu-Medrol to 60 mg every 8 hours on 6/25/2020, can continue with DuoNeb nebulization every 4 hours, Pulmicort nebulization 0.5 mg twice  daily, continue on levofloxacin.    Started on aggressive pulmonary toilet with incentive spirometry and flutter.  Despite increased Solu-Medrol and Spiriva and aggressive nebulization with DuoNeb is still short of breath.  CT scan of the chest PE protocol, negative for PE, dissection infiltrate or pneumothorax.  As patient shortness of breath felt to be worsened with nebs, they were stopped.  Albuterol inhalers started in addition to Breo/Incruse.  Daliresp was started BUT  was discontinued by pulmonary, Solu-Medrol switch to oral prednisone 40 mg daily.  He uses 5 L oxygen at home, maintaining saturation above 92 on 3 L at rest, recommend to keep keep 2-3 at rest and bumped to 4-5 on exertion keep saturation 90 or above.  - on day of discharge, 2-3L NC, pulmonary med and hospitalist strongly recommended goal O2 of 88%. Prednisone taper as below. He received a week of levofloxacin in hospital. Duoneb at home now prn.  Breo/Incruse inh continued at discharge. Pulm follow up with PFTs upon discharge.      Moderate aortic stenosis  EF normal, details as below. PCP to follow up and repeat as clinically indicated.   - of note mildly dilated ascending aorta on echo, however was listed as difficult study and CT chest did not endorse aortic aneurysm    Chronic thrombocytopenia  Stable,   TSH, B12, folate acid check are all in normal ranges in the past.  Follow-up as outpatient.     Gout  Prior to admission on colchicine PRN. No signs of flare at this time.      Chronic bilateral lower extremity edema  Dependent edema, consistent with venous stasis.   - Elevate legs     Dementia  Limited metabolic work-up negative last admission (TSH, vitamin B12). Currently alert and oriented except for year.   - Maintain normal day/night, sleep wake cycles  - Minimize sedating/altering medications as able  - Treat separate conditions as detailed above/below   - Stable at discharge.     Tobacco dependence  - Nicotine patch  ordered.  Cessation counseling given.       Consultations This Hospital Stay   CARE COORDINATOR IP CONSULT  RESPIRATORY CARE IP CONSULT  PHYSICAL THERAPY ADULT IP CONSULT  PULMONARY IP CONSULT    Code Status   Full Code    Time Spent on this Encounter   I, Anselmo Carrero MD, personally saw the patient today and spent greater than 30 minutes discharging this patient.       Anselmo Carrero MD  [facility]  ______________________________________________________________________    Physical Exam   Vital Signs: Temp: 98.3  F (36.8  C) Temp src: Oral BP: 113/68   Heart Rate: 75 Resp: 18 SpO2: 95 % O2 Device: Nasal cannula Oxygen Delivery: 2 LPM  Weight: 131 lbs 9.83 oz    Gen: NAD, pleasant  HEENT: Normocephalic, EOMI, MMM  Resp: diminished in general, no focal crackles,  no wheezes, no increased work of resp  CV: S1S2 heard, reg rhythm, reg rate, no pedal edema  Abdo: soft, nontender, nondistended, bowel sounds present  Ext: calves nontender, well perfused  Neuro: AAOx3, CN grossly intact, no facial asymmetry         Primary Care Physician   Eric Johnson    Discharge Orders      Home care nursing referral      Home Care PT Referral for Hospital Discharge      Home Care OT Referral for Hospital Discharge      Follow-up and recommended labs and tests     Follow up with primary care provider, Dr Eric Johnson, as scheduled for you on Tuesday 7/7/20 at 3:45PM, hospital follow- up.    Please arrive at 3:30PM.  Call clinic ([phone]) from the parking lot to let them know you have arrived.  Please wear a mask.     Reason for your hospital stay    COPD exacerbation     Follow-up and recommended labs and tests     Pulmonology, Dr Adams for you on Thursday 8/13/20 at 10:45AM. You will have PFTs done.  Their address is:  [facility]  Address: [address] [address]  Phone: [phone]    You will be sent an informational packet.     Activity    Your activity  upon discharge: activity as tolerated     Discharge Instructions    Continue steroid taper with prednisone  Continue your breathing treatments and follow up with Pulmonology - you will complete pulmonary function testing in the near future  Continue to abstain from smoking  Contact your regular doctor for hospital follow up     Full Code     Diet    Follow this diet upon discharge: Orders Placed This Encounter      Regular Diet Adult       Significant Results and Procedures   Most Recent 3 CBC's:  Recent Labs   Lab Test 06/30/20  0810 06/27/20  0748 06/25/20  0725 06/24/20  1028   WBC  --  13.0* 8.0 8.8   HGB  --  12.9* 11.8* 13.2*   MCV  --  90 90 91   * 181 104* 83*     Most Recent 3 BMP's:  Recent Labs   Lab Test 06/30/20  0810 06/28/20  0909 06/27/20  0748 06/25/20  0725   NA  --  137 135 139   POTASSIUM  --  4.3 3.9 4.0   CHLORIDE  --  101 99 104   CO2  --  31 33* 31   BUN  --  25 22 12   CR 0.83 0.91 0.86 0.73   ANIONGAP  --  5 3 4   SHEEBA  --  8.8 8.5 8.7   GLC  --  115* 117* 147*     Most Recent 3 Troponin's:  Recent Labs   Lab Test 06/24/20  0543 04/06/20  1608   TROPI 0.027 0.016     Most Recent 3 BNP's:  Recent Labs   Lab Test 06/28/20  0909 04/06/20  1608   NTBNPI [id number]     Most Recent 6 Bacteria Isolates From Any Culture (See EPIC Reports for Culture Details):  Recent Labs   Lab Test 06/25/20 2000 04/06/20  1748   CULT Heavy growth  Normal oma   No growth  No growth     Most Recent Urinalysis:  Recent Labs   Lab Test 04/06/20  1609   COLOR Light Yellow   APPEARANCE Clear   URINEGLC Negative   URINEBILI Negative   URINEKETONE Negative   SG 1.006   UBLD Negative   URINEPH 5.0   PROTEIN Negative   NITRITE Negative   LEUKEST Negative   ,   Results for orders placed or performed during the hospital encounter of 06/24/20   XR Chest Port 1 View    Narrative    EXAM: XR CHEST PORTABLE 1 VIEW  LOCATION: [facility]  DATE/TIME: 06/24/2020, 6:00 AM    INDICATION: Shortness of  breath.  COMPARISON: 04/06/2020.      Impression    IMPRESSION: Normal heart size and pulmonary vascularity. No acute infiltrates or consolidation. Blunting of the costophrenic angles, left greater than right, suggestive of small pleural effusions. Aortic calcification. No significant bony abnormalities.   Remainder unremarkable.     CT Chest Pulmonary Embolism w Contrast    Narrative    CT CHEST PULMONARY EMBOLISM WITH CONTRAST June 26, 2020 9:58 AM     HISTORY: Shortness of breath.    COMPARISON: None.    TECHNIQUE: Volumetric helical acquisition of CT images of the chest  from the lung apices to the kidneys were acquired after the  administration of 66 mL Isovue-370  IV contrast. Radiation dose for  this scan was reduced using automated exposure control, adjustment of  the mA and/or kV according to patient size, or iterative  reconstruction technique.    FINDINGS: There is no pulmonary embolism. Continued lobar volume loss  in the left lower lobe unchanged from previous. Stable moderate to  severe emphysema. Similar elevated left hemidiaphragm. Moderate  bronchial wall thickening again evident. The heart is not enlarged.  Thoracic aorta is atherosclerotic without evidence of dissection or  aneurysm. There is no pleural or pericardial effusion.  There is no  pneumothorax. Adrenal glands are unremarkable where seen. Remainder of  the visualized upper abdomen is unremarkable.      Impression    IMPRESSION:   1. No pulmonary embolism demonstrated.  2. No thoracic aortic dissection or aneurysm.   3. Unchanged lobar probable atelectasis of the left lower lobe.    BALTAZAR REESE MD   XR Chest Port 1 View    Narrative    CHEST PORTABLE ONE VIEW   6/28/2020 10:58 AM     HISTORY: Shortness of breath.    COMPARISON: 4/6/2020.      Impression    IMPRESSION: Consolidation of the left lung base medially is stable. No  new airspace disease. Hyperinflated lungs may be seen with COPD.  Stable cardiac silhouette.    DIANE MUNIZ MD    Echocardiogram Complete    Narrative    [id number]  [id number]  [id number]  [id number]^ARASELI^MARQUEZ^RAMÓN           [facility]  Echocardiography Laboratory  [address] [place] [address]        Name: ARTURO FORD  MRN: [id number]  : 1945  Study Date: 2020 02:08 PM  Age: 75 yrs  Gender: Male  Patient [facility]  Reason For Study: SOB  Ordering Physician: MARQUEZ MARX  Referring Physician: MARQUEZ MARX  Performed By: FAHEEM Larios     BSA: 1.8 m2  Height: 71 in  Weight: 136 lb  HR: 110  BP: 142/56 mmHg  _____________________________________________________________________________  __        Procedure  Complete Portable Echo Adult. Optison (NDC #1387-3197) given intravenously.  _____________________________________________________________________________  __        Interpretation Summary     Technically difficult imaging  Moderate valvular aortic stenosis.  Hyperdynamic left ventricular function  The visual ejection fraction is estimated at >70%.  The left ventricle is normal in size.  The ascending aorta is Mildly dilated.     Imaging is technically challenging and best views are obtained from subcostal  windows. The Aortic valve is calcified and shows restricted motion with at  least moderate AS ( HOOD 1.2cm2 MSG 18 mmHg) . There are no old studies  available for comparison.  _____________________________________________________________________________  __        Left Ventricle  The left ventricle is normal in size. Hyperdynamic left ventricular function.  The visual ejection fraction is estimated at >70%. Grade I or early diastolic  dysfunction. No regional wall motion abnormalities noted. There is no thrombus  seen in the left ventricle.     Right Ventricle  The right ventricle is normal in structure, function and size. There is no  mass or thrombus in the right ventricle.     Atria  Normal left atrial size. Right atrial size is normal. There is no atrial shunt  seen.      Mitral Valve  There is moderate mitral annular calcification. There is no mitral  regurgitation noted. There is no mitral valve stenosis.        Tricuspid Valve  Normal tricuspid valve. There is mild (1+) tricuspid regurgitation. Right  ventricular systolic pressure could not be approximated due to inadequate  tricuspid regurgitation. There is no tricuspid stenosis.     Aortic Valve  There is severe trileaflet aortic sclerosis. Moderate valvular aortic  stenosis. The mean AoV pressure gradient is 18.6 mmHg.     Pulmonic Valve  The pulmonic valve is not well visualized.     Vessels  The aortic root is normal size. The ascending aorta is Mildly dilated. The  inferior vena cava is normal. The pulmonary artery is normal size.     Pericardium  The pericardium appears normal. There is no pleural effusion.        Rhythm  The rhythm was supraventricular tachycardia.  _____________________________________________________________________________  __  MMode/2D Measurements & Calculations  IVSd: 1.6 cm     LVIDd: 4.0 cm  LVIDs: 2.8 cm  LVPWd: 1.6 cm  FS: 28.7 %  LV mass(C)d: 244.2 grams  LV mass(C)dI: 136.4 grams/m2  Ao root diam: 3.8 cm  LA dimension: 3.4 cm  asc Aorta Diam: 3.2 cm  LA/Ao: 0.89  LVOT diam: 1.8 cm  LVOT area: 2.7 cm2  RWT: 0.79        Doppler Measurements & Calculations  MV E max constantino: 70.6 cm/sec  MV A max constantino: 125.3 cm/sec  MV E/A: 0.56  MV dec time: 0.13 sec  Ao V2 max: 279.9 cm/sec  Ao max P.3 mmHg  Ao V2 mean: 201.2 cm/sec  Ao mean P.6 mmHg  Ao V2 VTI: 39.0 cm  HOOD(I,D): 1.2 cm2  HOOD(V,D): 1.2 cm2  LV V1 max P.8 mmHg  LV V1 max: 130.6 cm/sec  LV V1 VTI: 17.5 cm  SV(LVOT): 46.7 ml  SI(LVOT): 26.1 ml/m2  PA acc time: 0.09 sec  AV Constantino Ratio (DI): 0.47     HOOD Index (cm2/m2): 0.67  E/E' av.5  Lateral E/e': 8.2  Medial E/e': 14.7           _____________________________________________________________________________  __           Report approved by: Dr. Nicko Wilder 2020 04:13  PM            Discharge Medications   Discharge Medication List as of 7/1/2020 10:37 AM      START taking these medications    Details   fluticasone-vilanterol (BREO ELLIPTA) 200-25 MCG/INH inhaler Inhale 1 puff into the lungs daily, Disp-28 each,R-0, E-Prescribe      nicotine (NICODERM CQ) 21 MG/24HR 24 hr patch Place 1 patch onto the skin daily, Disp-14 patch,R-0, E-Prescribe      predniSONE (DELTASONE) 10 MG tablet Take 3 tablets (30 mg) by mouth daily for 2 days, THEN 2 tablets (20 mg) daily for 3 days, THEN 1 tablet (10 mg) daily for 3 days., Disp-15 tablet,R-0, E-Prescribe      umeclidinium (INCRUSE ELLIPTA) 62.5 MCG/INH inhaler Inhale 1 puff into the lungs daily, Disp-30 each,R-0, E-Prescribe         CONTINUE these medications which have CHANGED    Details   ipratropium - albuterol 0.5 mg/2.5 mg/3 mL (DUONEB) 0.5-2.5 (3) MG/3ML neb solution Take 1 vial (3 mLs) by nebulization every 6 hours as needed for shortness of breath / dyspnea or wheezing, Disp-40 vial,R-3, No Print Out         CONTINUE these medications which have NOT CHANGED    Details   albuterol (PROAIR HFA/PROVENTIL HFA/VENTOLIN HFA) 108 (90 Base) MCG/ACT inhaler INHALE 2 PUFFS BY MOUTH EVERY 6 HOURS AS NEEDED FOR SHORTNESS OF BREATH OR WHEEZING., Disp-18 Inhaler,R-0, E-Prescribe      aspirin 81 MG tablet Take 1 tablet by mouth daily, Historical      colchicine (COLCYRS) 0.6 MG tablet Take 1 tablet (0.6 mg) by mouth 2 times daily as needed for moderate pain TAKE 1 TABLET BY MOUTH TWICE A DAY, Disp-60 tablet,R-0, No Print Out      docusate sodium (DULCOLAX STOOL SOFTENER) 100 MG capsule Take 2 capsules by mouth daily as needed , Historical      folic acid (FOLVITE) 1 MG tablet Take 1 tablet (1 mg) by mouth daily, Disp-90 tablet, R-3, E-Prescribe      vitamin D3 (CHOLECALCIFEROL) 2000 units (50 mcg) tablet Take 1 tablet by mouth daily, Historical         STOP taking these medications       budesonide (PULMICORT) 0.5 MG/2ML neb solution Comments:    Reason for Stopping:             Allergies   Allergies   Allergen Reactions     Penicillins Swelling     Sulfa Drugs Swelling     Chantix [Varenicline Tartrate]      Hallucinations

## 2020-07-01 NOTE — PROGRESS NOTES
SW:  D:  Yesterday, 6/30, patient begin telling staff he planned to return to his daughter's home.  Nursing indicated he was up in his room with SBA however does get short of breath with exertion.   TCU was still recommended by PT due to SOB with exertion.    I: Writer spoke with patient's daughter Padmini last night at [phone].  She had spoken with patient and was aware of patient's plan to come home and she is comfortable with this.   Patient's oxygen provider is Jon and she has a unit at her home.    We discussed the resumption of home care for RN,PT.OT and HHA which she is in agreement with.  She stated family has call out to PCP for a nicotine product for patient to help with cessation of smoking.      P: Daughter will be here at 1030 to review discharge instructions and transport patient home.   Patient is discharging with the patch  Orders are being sent to [email or web address] at [phone] and Abby was updated at [phone].

## 2020-07-01 NOTE — PLAN OF CARE
Discharge    Patient discharged to home via daughter with home RN/PT/OT  Care plan note: A&O x4. VSS on 2 LPM NC, with activity needs 3-4. ALONSO, SOB. Up w/1 GB. Denies pain/nausea. Prednisone taper. Discharge AVS reviewed with patient and daughter, educated on new meds, COPD and when to seek medial attention. Patient verbalized understanding and had no further questions. Meds sent with patient. IV removed. All patient belongings accounted for and sent home with patient. Follow up appointments with PCP and pulm made.     Listed belongings gathered and returned to patient. Yes  Care Plan and Patient education resolved: Yes  Prescriptions if needed, hard copies sent with patient  Yes  Home and hospital acquired medications returned to patient: Yes  Medication Bin checked and emptied on discharge Yes  Follow up appointment made for patient: Yes

## 2020-07-07 ENCOUNTER — MEDICAL CORRESPONDENCE (OUTPATIENT)
Dept: FAMILY MEDICINE | Facility: CLINIC | Age: 75
End: 2020-07-07

## 2020-07-07 ENCOUNTER — OFFICE VISIT (OUTPATIENT)
Dept: FAMILY MEDICINE | Facility: CLINIC | Age: 75
End: 2020-07-07

## 2020-07-07 DIAGNOSIS — J44.1 COPD EXACERBATION (H): ICD-10-CM

## 2020-07-07 DIAGNOSIS — J42 CHRONIC BRONCHITIS, UNSPECIFIED CHRONIC BRONCHITIS TYPE (H): Primary | ICD-10-CM

## 2020-07-07 DIAGNOSIS — F02.80 LATE ONSET ALZHEIMER'S DISEASE WITHOUT BEHAVIORAL DISTURBANCE (H): ICD-10-CM

## 2020-07-07 DIAGNOSIS — M62.81 GENERALIZED MUSCLE WEAKNESS: ICD-10-CM

## 2020-07-07 DIAGNOSIS — G30.1 LATE ONSET ALZHEIMER'S DISEASE WITHOUT BEHAVIORAL DISTURBANCE (H): ICD-10-CM

## 2020-07-07 PROCEDURE — 99496 TRANSJ CARE MGMT HIGH F2F 7D: CPT | Mod: 25 | Performed by: FAMILY MEDICINE

## 2020-07-07 RX ORDER — IPRATROPIUM BROMIDE AND ALBUTEROL SULFATE 2.5; .5 MG/3ML; MG/3ML
1 SOLUTION RESPIRATORY (INHALATION) EVERY 6 HOURS PRN
Qty: 120 VIAL | Refills: 11 | Status: SHIPPED | OUTPATIENT
Start: 2020-07-07 | End: 2021-01-25

## 2020-07-07 NOTE — PROGRESS NOTES
Problem(s) Oriented visit        SUBJECTIVE:                                                    Jah Tate is a 75 year old male who presents to clinic today for the following health issues :      Hospital Follow-up Visit:    Hospital/Nursing Home/IP Rehab Facility: [facility]  Date of Admission:   Date of Discharge:   Reason(s) for Admission: copd exacerbation      Was your hospitalization related to COVID-19? No   Problems taking medications regularly:  None  Medication changes since discharge: None  Problems adhering to non-medication therapy:  None    Summary of hospitalization:  [facility] discharge summary reviewed  Diagnostic Tests/Treatments reviewed.  Follow up needed: none  Other Healthcare Providers Involved in Patient s Care:         None  Update since discharge: stable. Post Discharge Medication Reconciliation: discharge medications reconciled, continue medications without change.  Plan of care communicated with patient and familyt            1. COPD exacerbation (H)  See discontinue Summary from the last hospitalization.  Now on 3-4 liters of O2  Here with daughters that he is now living with.         Problem list, Medication list, Allergies, and Medical/Social/Surgical histories reviewed in EPIC and updated as appropriate.   Additional history: as documented    ROS:  General and Resp. 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        OBJECTIVE:                                                    There were no vitals taken for this visit.  There is no height or weight on file to calculate BMI.   APPEARANCE: = Relaxed and in no distress  Conj/Eyelids = noninjected and lids and lashes are without inflammation  PERRLA/Irises = Pupils Round Reactive to Light and Irisis without inflammation  Neck = No anterior or posterior adenopathy appreciated.  Thyroid = Not enlarged and no masses felt  Resp effort =  Mild Distress  Breath Sounds =  distant breath sounds  Heart Rate, Rhythm, & sounds (no Murm)  = Regular rate and rhythm with no S3, S4, or murmur appreciated.  Carotid Art's = Pulses full and equal and no bruits appreciated  Abdomen = Soft, nontender, no masses, & bowel sounds in all quadrants  Liver/Spleen = Normal span and no splenomegaly noted  Digits and Nails = FROM in all finger joints, no nail dystrophy  Ext (edema) = No pretibial edema noted or elsewhere  Musculsktl =  Strength and ROM of major joints are within normal limits  SKIN = absent significant rashes or lesions   Recent/Remote Memory = Alert and Oriented x 3  Mood/Affect = Cooperative and interested     ASSESSMENT/PLAN:                                                        Diagnoses and all orders for this visit:    Chronic bronchitis, unspecified chronic bronchitis type (H)    COPD exacerbation (H)  -     ipratropium - albuterol 0.5 mg/2.5 mg/3 mL (DUONEB) 0.5-2.5 (3) MG/3ML neb solution; Take 1 vial (3 mLs) by nebulization every 6 hours as needed for shortness of breath / dyspnea or wheezing    Generalized muscle weakness  -     Lift Chair W Seat Lift Mechanism Order for DME - ONLY FOR DME    Moderate Dementia         Regular exercise  There are no Patient Instructions on file for this visit.    The following health maintenance items are reviewed in  Epic and correct as of today:  Health Maintenance   Topic Date Due     ZOSTER IMMUNIZATION (1 of 2) 01/08/1993     MEDICARE ANNUAL WELLNESS VISIT  01/08/2008     LIPID  07/17/2017     PHQ-2  01/01/2020     INFLUENZA VACCINE (1) 09/01/2020     FALL RISK ASSESSMENT  06/18/2021     ADVANCE CARE PLANNING  03/01/2023     DTAP/TDAP/TD IMMUNIZATION (2 - Td) 06/11/2026     SPIROMETRY  Completed     COPD ACTION PLAN  Completed     PNEUMOCOCCAL IMMUNIZATION 65+ LOW/MEDIUM RISK  Completed     IPV IMMUNIZATION  Aged Out     MENINGITIS IMMUNIZATION  Aged Out       Eric Johnson MD  [facility]  Family Practice  [facility]  [phone]    For any issues my office # is [phone]

## 2020-07-08 ENCOUNTER — MEDICAL CORRESPONDENCE (OUTPATIENT)
Dept: FAMILY MEDICINE | Facility: CLINIC | Age: 75
End: 2020-07-08

## 2020-07-13 ENCOUNTER — MEDICAL CORRESPONDENCE (OUTPATIENT)
Dept: FAMILY MEDICINE | Facility: CLINIC | Age: 75
End: 2020-07-13

## 2020-07-14 ENCOUNTER — MEDICAL CORRESPONDENCE (OUTPATIENT)
Dept: FAMILY MEDICINE | Facility: CLINIC | Age: 75
End: 2020-07-14

## 2020-07-15 ENCOUNTER — PATIENT OUTREACH (OUTPATIENT)
Dept: CARE COORDINATION | Facility: CLINIC | Age: 75
End: 2020-07-15

## 2020-07-15 ASSESSMENT — ACTIVITIES OF DAILY LIVING (ADL)
DEPENDENT_IADLS:: TRANSPORTATION;CLEANING;COOKING;LAUNDRY;SHOPPING;MEAL PREPARATION;MEDICATION MANAGEMENT;MONEY MANAGEMENT

## 2020-07-15 NOTE — PROGRESS NOTES
"Clinic Care Coordination Contact    Follow Up Progress Note      Assessment: Patient was discharged to marie/Padmini's home with homecare services (PT, OT, HHA, RN). TCU placement was explored but it proved difficult to find placement to accept and so Padmini offered to have patient to come to her home with homecare. Spouse was dishcharged to her home as well with homecare.    Outreached and Padmini's phone VM was full. Outreached to GUILIA Rockwell and she said \"things aren't going very well\" and she feels that patient needs 24/7 care. They are trying to get pt and spouse house on the market for sell so that they can have the funds to pay for AL/memory care. They were directed to meet with an elder care  which they intend to do. Talked through waiver guidelines. Also mentioned if TCU needs to be looked into if patient declines further to go back to hospital. Luli indicated understanding.    Patient and spouse want to stay together if they move. They may consider both going to AL first and patient can transfer to memory care if he needs. No further questions today    Intervention/Education provided during outreach: meeting with elder care  to work through assets etc as they work towards moving patient and spouse to assisted living care.   Patient has homecare services in place      Plan:   Continue homecare  Work with elder care  on financials and moving to assisted living process  SW Care Coordinator will schedule no further outreach but can be available as needed and/or if a new referral is placed.    Sonia Bray, MSW, [facility]  Clinic Care Coordinator  [email or web address]  [phone]  "

## 2020-07-20 ENCOUNTER — MEDICAL CORRESPONDENCE (OUTPATIENT)
Dept: FAMILY MEDICINE | Facility: CLINIC | Age: 75
End: 2020-07-20

## 2020-07-20 DIAGNOSIS — J44.1 COPD EXACERBATION (H): ICD-10-CM

## 2020-07-20 RX ORDER — ALBUTEROL SULFATE 90 UG/1
AEROSOL, METERED RESPIRATORY (INHALATION)
Qty: 18 G | Refills: 0 | Status: SHIPPED | OUTPATIENT
Start: 2020-07-20 | End: 2020-08-10

## 2020-07-21 ENCOUNTER — MEDICAL CORRESPONDENCE (OUTPATIENT)
Dept: FAMILY MEDICINE | Facility: CLINIC | Age: 75
End: 2020-07-21

## 2020-07-24 ENCOUNTER — MEDICAL CORRESPONDENCE (OUTPATIENT)
Dept: FAMILY MEDICINE | Facility: CLINIC | Age: 75
End: 2020-07-24

## 2020-07-27 ENCOUNTER — MEDICAL CORRESPONDENCE (OUTPATIENT)
Dept: FAMILY MEDICINE | Facility: CLINIC | Age: 75
End: 2020-07-27

## 2020-08-03 ENCOUNTER — MEDICAL CORRESPONDENCE (OUTPATIENT)
Dept: FAMILY MEDICINE | Facility: CLINIC | Age: 75
End: 2020-08-03

## 2020-08-03 ENCOUNTER — TRANSFERRED RECORDS (OUTPATIENT)
Dept: FAMILY MEDICINE | Facility: CLINIC | Age: 75
End: 2020-08-03

## 2020-08-10 ENCOUNTER — MEDICAL CORRESPONDENCE (OUTPATIENT)
Dept: FAMILY MEDICINE | Facility: CLINIC | Age: 75
End: 2020-08-10

## 2020-08-10 DIAGNOSIS — J44.1 COPD EXACERBATION (H): ICD-10-CM

## 2020-08-10 RX ORDER — ALBUTEROL SULFATE 90 UG/1
AEROSOL, METERED RESPIRATORY (INHALATION)
Qty: 18 INHALER | Refills: 0 | Status: SHIPPED | OUTPATIENT
Start: 2020-08-10 | End: 2020-08-31

## 2020-08-11 ENCOUNTER — TELEPHONE (OUTPATIENT)
Dept: FAMILY MEDICINE | Facility: CLINIC | Age: 75
End: 2020-08-11

## 2020-08-11 DIAGNOSIS — F17.200 SMOKER: ICD-10-CM

## 2020-08-11 DIAGNOSIS — H10.9 CONJUNCTIVITIS: Primary | ICD-10-CM

## 2020-08-11 DIAGNOSIS — J44.1 COPD EXACERBATION (H): ICD-10-CM

## 2020-08-11 RX ORDER — NICOTINE 21 MG/24HR
1 PATCH, TRANSDERMAL 24 HOURS TRANSDERMAL EVERY 24 HOURS
Qty: 30 PATCH | Refills: 0 | Status: SHIPPED | OUTPATIENT
Start: 2020-08-11 | End: 2020-08-14

## 2020-08-11 RX ORDER — GENTAMICIN SULFATE 3 MG/ML
1-2 SOLUTION/ DROPS OPHTHALMIC 4 TIMES DAILY
Qty: 3 ML | Refills: 0 | Status: SHIPPED | OUTPATIENT
Start: 2020-08-11 | End: 2020-08-18

## 2020-08-11 NOTE — TELEPHONE ENCOUNTER
Daughter/primary caregiver, Padmini, calls requesting refills on:   -Incruse  -Breo  Plan: per Dr. Johnson refills sent to pharmacy.    Padmini also requests refill on Nicoderm patches - was discharged from hospital 7/1/20 with rx for Nicoderm 21mcg #14. They have been using the patches intermittently and he has not smoked since hospital and doing well with this. He is living with Padmini in her home currently.   She asks if should have next dose of nicoderm. Per Dr. Johnson and Radha Bejarano Pharm.D - rx for nicoderm 14mcg #30 sig change QD and 7mcg #30 change QD sent to pharmacy.    Padmini also mentions believes patient may have pinkeye and would like rx for gtts if possible. Reports right eye is crusty, mattery and irritated past 3 days. Patient denies pain, sensitivity or headache but does report itching and irritation.  Plan: per Dr. Johnson rx for gentamycin gtts sent to pharmacy. RTC if symptoms persist.  Sharri Browning RN

## 2020-08-14 ENCOUNTER — TRANSFERRED RECORDS (OUTPATIENT)
Dept: FAMILY MEDICINE | Facility: CLINIC | Age: 75
End: 2020-08-14

## 2020-08-14 NOTE — TELEPHONE ENCOUNTER
Received fax from [facility] pharmacy stating Nicotine patches need PA.   Called patient to see if he had tried anything else to quit smoking. Spoke with son in law who says at this time patient is doing good and they do not need the patches at this time.  Called and informed pharmacy.

## 2020-08-31 DIAGNOSIS — J44.1 COPD EXACERBATION (H): ICD-10-CM

## 2020-08-31 RX ORDER — ALBUTEROL SULFATE 90 UG/1
AEROSOL, METERED RESPIRATORY (INHALATION)
Qty: 18 INHALER | Refills: 0 | Status: SHIPPED | OUTPATIENT
Start: 2020-08-31

## 2020-09-01 ENCOUNTER — VIRTUAL VISIT (OUTPATIENT)
Dept: FAMILY MEDICINE | Facility: CLINIC | Age: 75
End: 2020-09-01

## 2020-09-01 DIAGNOSIS — E53.8 FOLATE DEFICIENCY: ICD-10-CM

## 2020-09-01 DIAGNOSIS — Z71.89 ACP (ADVANCE CARE PLANNING): Primary | ICD-10-CM

## 2020-09-01 PROCEDURE — 99497 ADVNCD CARE PLAN 30 MIN: CPT | Mod: 95 | Performed by: NURSE PRACTITIONER

## 2020-09-01 PROCEDURE — 99212 OFFICE O/P EST SF 10 MIN: CPT | Mod: 25 | Performed by: NURSE PRACTITIONER

## 2020-09-01 RX ORDER — PREDNISONE 2.5 MG/1
2.5 TABLET ORAL DAILY
COMMUNITY
Start: 2020-08-14 | End: 2021-01-25

## 2020-09-01 RX ORDER — FOLIC ACID 1 MG/1
1 TABLET ORAL DAILY
Qty: 90 TABLET | Refills: 3 | Status: ON HOLD | OUTPATIENT
Start: 2020-09-01 | End: 2021-02-05

## 2020-09-01 NOTE — PROGRESS NOTES
Problem(s) Oriented visit      Video-Visit Details    Type of service:  Video Visit    Video Start Time (time video started): 1403    Video End Time (time video stopped): 1420    Originating Location (pt. Location): Home    Distant Location (provider location):  [facility]     Mode of Communication:  Video Conference via Facetime    Physician has received verbal consent for a Video Visit from the patient? Yes      JULIA Covington CNP        SUBJECTIVE:                                                    Jah Tate is a 75 year old male who presents to clinic today for the following health issues :    Jah and his wife Archana are moving into an assisted living facility tomorrow and need help filling out POLST forms. Jah would like CPR and life sustaining treatment, artificial nutrition, and IV antibiotics. He does not want futile care and would not like life support measures if his prognosis is poor. He is open to dialysis if it is a short term solution only.      Problem list, Medication list, Allergies, and Medical/Social/Surgical histories reviewed in [facility] and updated as appropriate.   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      Current Outpatient Medications   Medication Sig Dispense Refill     albuterol (PROAIR  HFA/PROVENTIL HFA/VENTOLIN HFA) 108 (90 Base) MCG/ACT inhaler INHALE 2 PUFFS BY MOUTH EVERY 6 HOURS AS NEEDED FOR SHORTNESS OF BREATH OR WHEEZING. 18 Inhaler 0     aspirin 81 MG tablet Take 1 tablet by mouth daily       docusate sodium (DULCOLAX STOOL SOFTENER) 100 MG capsule Take 2 capsules by mouth daily as needed        fluticasone-vilanterol (BREO ELLIPTA) 200-25 MCG/INH inhaler Inhale 1 puff into the lungs daily 28 each 3     folic acid (FOLVITE) 1 MG tablet Take 1 tablet (1 mg) by mouth daily 90 tablet 3     ipratropium - albuterol 0.5 mg/2.5 mg/3 mL (DUONEB) 0.5-2.5 (3) MG/3ML neb solution Take 1 vial (3 mLs) by nebulization every 6 hours as needed for shortness of breath / dyspnea or wheezing 120 vial 11     predniSONE (DELTASONE) 2.5 MG tablet Take 2.5 mg by mouth daily       umeclidinium (INCRUSE ELLIPTA) 62.5 MCG/INH inhaler Inhale 1 puff into the lungs daily 30 each 3     vitamin D3 (CHOLECALCIFEROL) 2000 units (50 mcg) tablet Take 1 tablet by mouth daily       colchicine (COLCYRS) 0.6 MG tablet Take 1 tablet (0.6 mg) by mouth 2 times daily as needed for moderate pain TAKE 1 TABLET BY MOUTH TWICE A DAY (Patient not taking: Reported on 9/1/2020) 60 tablet 0       OBJECTIVE:                                                    No vitals taken- virtual visit  Constitutional: Alert and oriented non-toxic appearing male in no acute distress, chronically ill appearing  Resp: Respirations unlabored  Psych: Pleasant and interactive, affect euthymic, makes appropriate eye contact, answers questions appropriately, thought content logical         ASSESSMENT/PLAN:                                                        Jah was seen today for forms.    Diagnoses and all orders for this visit:    ACP (advance care planning)  -     C ADVANCE CARE PLANNING FIRST 30 MINS    POLST completed    Folate deficiency  -     folic acid (FOLVITE) 1 MG tablet; Take 1 tablet (1 mg) by mouth daily            The following health  maintenance items are reviewed in Epic and correct as of today:  Health Maintenance   Topic Date Due     ZOSTER IMMUNIZATION (1 of 2) 01/08/1993     MEDICARE ANNUAL WELLNESS VISIT  01/08/2008     LIPID  07/17/2017     PHQ-2  01/01/2020     INFLUENZA VACCINE (1) 09/01/2020     FALL RISK ASSESSMENT  07/15/2021     ADVANCE CARE PLANNING  03/01/2023     DTAP/TDAP/TD IMMUNIZATION (2 - Td) 06/11/2026     SPIROMETRY  Completed     COPD ACTION PLAN  Completed     PNEUMOCOCCAL IMMUNIZATION 65+ LOW/MEDIUM RISK  Completed     IPV IMMUNIZATION  Aged Out     MENINGITIS IMMUNIZATION  Aged Out     HEPATITIS B IMMUNIZATION  Aged Out     This was a virtual video-visit conducted during COVID-19 outbreak in regulation with social distancing and quarantine recommendations of the CDC and [place] department of health and human services. A two way audio/video connection was used in real time with patient's consent.    JULIA Covington CNP  [facility]  Family Practice  [facility]  [phone]    For any issues my office # is [phone]

## 2020-09-03 ENCOUNTER — MEDICAL CORRESPONDENCE (OUTPATIENT)
Dept: FAMILY MEDICINE | Facility: CLINIC | Age: 75
End: 2020-09-03

## 2020-09-11 ENCOUNTER — TELEPHONE (OUTPATIENT)
Dept: FAMILY MEDICINE | Facility: CLINIC | Age: 75
End: 2020-09-11

## 2020-09-11 DIAGNOSIS — F02.80 LATE ONSET ALZHEIMER'S DISEASE WITHOUT BEHAVIORAL DISTURBANCE (H): ICD-10-CM

## 2020-09-11 DIAGNOSIS — G30.1 LATE ONSET ALZHEIMER'S DISEASE WITHOUT BEHAVIORAL DISTURBANCE (H): ICD-10-CM

## 2020-09-11 DIAGNOSIS — Z91.81 AT RISK FOR FALLS: ICD-10-CM

## 2020-09-11 DIAGNOSIS — J44.9 END STAGE CHRONIC OBSTRUCTIVE PULMONARY DISEASE (H): Primary | ICD-10-CM

## 2020-09-11 NOTE — TELEPHONE ENCOUNTER
Lacey - nurse at [facility] -- calls requesting order for wheelchair be sent to them so patient can have his own chair rather than using facility's transfer chair. Lacey will send order to company they use that will come out and measure/fit patient for chair.   Plan: ok per Dr. Johnson and wheelchair order faxed to Lacey at [phone].   Sharri Browning RN

## 2020-09-14 ENCOUNTER — MEDICAL CORRESPONDENCE (OUTPATIENT)
Dept: FAMILY MEDICINE | Facility: CLINIC | Age: 75
End: 2020-09-14

## 2020-09-17 ENCOUNTER — TRANSFERRED RECORDS (OUTPATIENT)
Dept: FAMILY MEDICINE | Facility: CLINIC | Age: 75
End: 2020-09-17

## 2020-09-24 ENCOUNTER — MEDICAL CORRESPONDENCE (OUTPATIENT)
Dept: FAMILY MEDICINE | Facility: CLINIC | Age: 75
End: 2020-09-24

## 2020-10-01 ENCOUNTER — MEDICAL CORRESPONDENCE (OUTPATIENT)
Dept: FAMILY MEDICINE | Facility: CLINIC | Age: 75
End: 2020-10-01

## 2020-10-02 ENCOUNTER — DOCUMENTATION ONLY (OUTPATIENT)
Dept: OTHER | Facility: CLINIC | Age: 75
End: 2020-10-02

## 2020-10-05 ENCOUNTER — TRANSFERRED RECORDS (OUTPATIENT)
Dept: FAMILY MEDICINE | Facility: CLINIC | Age: 75
End: 2020-10-05

## 2020-11-05 ENCOUNTER — VIRTUAL VISIT (OUTPATIENT)
Dept: FAMILY MEDICINE | Facility: CLINIC | Age: 75
End: 2020-11-05

## 2020-11-05 DIAGNOSIS — J44.9 END STAGE CHRONIC OBSTRUCTIVE PULMONARY DISEASE (H): Primary | ICD-10-CM

## 2020-11-05 DIAGNOSIS — Z91.81 AT RISK FOR FALLS: ICD-10-CM

## 2020-11-05 PROCEDURE — 99213 OFFICE O/P EST LOW 20 MIN: CPT | Mod: 95 | Performed by: NURSE PRACTITIONER

## 2020-11-05 RX ORDER — PREDNISONE 5 MG/1
5 TABLET ORAL DAILY
Status: ON HOLD | COMMUNITY
Start: 2020-09-17 | End: 2021-02-05

## 2020-11-05 NOTE — PROGRESS NOTES
"Problem(s) Oriented visit      Video-Visit Details    Type of service:  Video Visit    Video Start Time (time video started): 1600    Video End Time (time video stopped): 1615    Originating Location (pt. Location): Home    Distant Location (provider location):  [facility]     Mode of Communication:  Video Conference via Freebase    Physician has received verbal consent for a Video Visit from the patient? Yes      JULIA Covington CNP        SUBJECTIVE:                                                    Jah Tate is a 75 year old male who presents to clinic today for the following health issues :    CC: Wheelchair request    Jah is accompanied by his daughter Padmini. He is requesting a letter of medical necessity for a motorized wheelchair. Jah has a history of severe, oxygen dependent COPD which limits his mobility and causes difficulty breathing with physical exertion, worsening over time. On 4-5 LPM of O2 throughout the day and night. He is on daily prednisone in addition to multiple inhalers for COPD, following with [facility] every three months. Most recent PFTs from notes per [facility] demonstrate severe restrictive impairment and \"guarded prognosis.\" Sits in a chair most of the day, able to do a lateral transfer to and from a power operated wheelchair. He and his family think he will be able to operate the steering system, and maintain postural stability and position while operating a power wheelchair. [place] himself in a standard wheelchair causes significant dyspnea and fatigue. They report his home would provide adequate access between rooms, maneuvering space, and surfaces for operating power wheelchair. Power wheelchair will significantly improve his ability to participate in mobility related ADLs and he will use this.    Wheelchair Documentation  1. The patient has mobility limitations that impairs their ability to participate in one or more mobility related activities: " Toileting.  2. The patient's mobility limitations cannot be safely resolved by using a cane/walker:Yes  3. The patients home has adequate access to use a manual and power operated wheelchair:Yes  4. The use of a manual wheelchair or power operated wheelchair on a regular basis will improve the patients ability to participate in mobility related ADL's at home:Yes  5. The patient is willing to use a power operated wheelchair at home:Yes  6. The patient has adequate upper body strength and the mental capability to safely use a manual wheelchair and/or has a caregiver that is able to assist: No- strength is limited and he is deconditioned due to his chronic issues with COPD. Therefore, he is requesting a power operated wheelchair and it is my opinion he may benefit from this.  7. Does the patient have a lower extremity injury or edema?No    Reason for Type of Wheelchair  Patient weight: 59.7kg  Power operated vehicle/scooter- would improve his quality of life and mobility to use a power wheelchair. Weak and deconditioned due to COPD, difficulty operating manual wheelchair independently due to this.    Use of a power wheelchair will significantly improve the patient's ability to participate in MRADLs and the patient will use it on a regular basis in the home. The patient has not expressed an unwillingness to use the power wheelchair that is provided in the home.      Problem list, Medication list, Allergies, and Medical/Social/Surgical histories reviewed in EPIC and updated as appropriate.   Additional history: as documented    ROS:  Gen, CV, resp, MSK, neuro, psych negative except as listed per HPI    Histories:   Patient Active Problem List   Diagnosis     End-stage COPD on Home O2 5 LPM     Health Care Home     Influenza B     Chronic gout of multiple sites     Moderate Dementia      CAP (community acquired pneumonia)     Smoker     COPD exacerbation (H)     Past Surgical History:   Procedure Laterality Date      FRACTURE TX, ANKLE RT/LT      left- w/plate       Social History     Tobacco Use     Smoking status: Current Every Day Smoker     Packs/day: 0.50     Years: 60.00     Pack years: 30.00     Types: Cigarettes     Smokeless tobacco: Never Used   Substance Use Topics     Alcohol use: No     Comment: 4-5 drinks per month     No family history on file.      Current Outpatient Medications   Medication Sig Dispense Refill     albuterol (PROAIR HFA/PROVENTIL HFA/VENTOLIN HFA) 108 (90 Base) MCG/ACT inhaler INHALE 2 PUFFS BY MOUTH EVERY 6 HOURS AS NEEDED FOR SHORTNESS OF BREATH OR WHEEZING. 18 Inhaler 0     aspirin 81 MG tablet Take 1 tablet by mouth daily       docusate sodium (DULCOLAX STOOL SOFTENER) 100 MG capsule Take 2 capsules by mouth daily as needed        fluticasone-vilanterol (BREO ELLIPTA) 200-25 MCG/INH inhaler Inhale 1 puff into the lungs daily 28 each 3     folic acid (FOLVITE) 1 MG tablet Take 1 tablet (1 mg) by mouth daily 90 tablet 3     ipratropium - albuterol 0.5 mg/2.5 mg/3 mL (DUONEB) 0.5-2.5 (3) MG/3ML neb solution Take 1 vial (3 mLs) by nebulization every 6 hours as needed for shortness of breath / dyspnea or wheezing 120 vial 11     predniSONE (DELTASONE) 5 MG tablet Take 5 mg by mouth daily       umeclidinium (INCRUSE ELLIPTA) 62.5 MCG/INH inhaler Inhale 1 puff into the lungs daily 30 each 3     vitamin D3 (CHOLECALCIFEROL) 2000 units (50 mcg) tablet Take 1 tablet by mouth daily       colchicine (COLCYRS) 0.6 MG tablet Take 1 tablet (0.6 mg) by mouth 2 times daily as needed for moderate pain TAKE 1 TABLET BY MOUTH TWICE A DAY (Patient not taking: Reported on 9/1/2020) 60 tablet 0     predniSONE (DELTASONE) 2.5 MG tablet Take 2.5 mg by mouth daily         OBJECTIVE:                                                    No vitals taken- virtual visit  Constitutional: Alert and oriented non-toxic but chronically ill appearing elderly male in no acute distress  HEENT: No periorbital edema or perioral  cyanosis  Resp: Respirations slightly labored, speaking in full sentences with oxygen via nasal cannula in place  Psych: Pleasant and interactive, affect euthymic, makes appropriate eye contact, answers questions appropriately, thought content logical       ASSESSMENT/PLAN:                                                        Jah was seen today for consult.    Diagnoses and all orders for this visit:    End stage chronic obstructive pulmonary disease (H)  At risk for falls  -     Wheelchair Order for DME - ONLY FOR DME    Jah has weakness and deconditioning related to end stage COPD. Physical exertion, including wheeling himself in a manual wheelchair, causes significant ALONSO and fatigue and weakness. It is my opinion that he would benefit from a power operated wheelchair and that he would be able to safely use this.        The following health maintenance items are reviewed in Epic and correct as of today:  Health Maintenance   Topic Date Due     HEPATITIS C SCREENING  01/08/1963     ZOSTER IMMUNIZATION (1 of 2) 01/08/1993     MEDICARE ANNUAL WELLNESS VISIT  01/08/2008     LIPID  07/17/2017     PHQ-2  01/01/2020     INFLUENZA VACCINE (1) 09/01/2020     FALL RISK ASSESSMENT  07/15/2021     ADVANCE CARE PLANNING  10/02/2025     DTAP/TDAP/TD IMMUNIZATION (2 - Td) 06/11/2026     SPIROMETRY  Completed     COPD ACTION PLAN  Completed     Pneumococcal Vaccine: 65+ Years  Completed     Pneumococcal Vaccine: Pediatrics (0 to 5 Years) and At-Risk Patients (6 to 64 Years)  Aged Out     IPV IMMUNIZATION  Aged Out     MENINGITIS IMMUNIZATION  Aged Out     HEPATITIS B IMMUNIZATION  Aged Out       This was a virtual video-visit conducted during COVID-19 outbreak in regulation with social distancing and quarantine recommendations of the CDC and [place] department of health and human services. A two way audio/video connection was used in real time with patient's consent.    JULIA Covington [facility]  GROUP  Family Practice  [facility]  [phone]    For any issues my office # is [phone]

## 2020-11-23 ENCOUNTER — MEDICAL CORRESPONDENCE (OUTPATIENT)
Dept: FAMILY MEDICINE | Facility: CLINIC | Age: 75
End: 2020-11-23

## 2020-12-15 DIAGNOSIS — J44.1 COPD EXACERBATION (H): ICD-10-CM

## 2021-01-11 ENCOUNTER — MEDICAL CORRESPONDENCE (OUTPATIENT)
Dept: FAMILY MEDICINE | Facility: CLINIC | Age: 76
End: 2021-01-11

## 2021-01-25 ENCOUNTER — APPOINTMENT (OUTPATIENT)
Dept: GENERAL RADIOLOGY | Facility: CLINIC | Age: 76
DRG: 193 | End: 2021-01-25
Attending: EMERGENCY MEDICINE
Payer: COMMERCIAL

## 2021-01-25 ENCOUNTER — HOSPITAL ENCOUNTER (INPATIENT)
Facility: CLINIC | Age: 76
LOS: 11 days | Discharge: HOSPICE | DRG: 193 | End: 2021-02-05
Attending: EMERGENCY MEDICINE | Admitting: INTERNAL MEDICINE
Payer: COMMERCIAL

## 2021-01-25 ENCOUNTER — APPOINTMENT (OUTPATIENT)
Dept: CT IMAGING | Facility: CLINIC | Age: 76
DRG: 193 | End: 2021-01-25
Attending: EMERGENCY MEDICINE
Payer: COMMERCIAL

## 2021-01-25 DIAGNOSIS — J44.1 COPD EXACERBATION (H): ICD-10-CM

## 2021-01-25 DIAGNOSIS — Z51.5 END OF LIFE CARE: Primary | ICD-10-CM

## 2021-01-25 DIAGNOSIS — J18.9 PNEUMONIA OF BOTH LUNGS DUE TO INFECTIOUS ORGANISM, UNSPECIFIED PART OF LUNG: ICD-10-CM

## 2021-01-25 PROBLEM — G30.1 LATE ONSET ALZHEIMER'S DISEASE WITHOUT BEHAVIORAL DISTURBANCE (H): Status: ACTIVE | Noted: 2019-07-16

## 2021-01-25 PROBLEM — F02.80 LATE ONSET ALZHEIMER'S DISEASE WITHOUT BEHAVIORAL DISTURBANCE (H): Status: ACTIVE | Noted: 2019-07-16

## 2021-01-25 PROBLEM — C34.90 LUNG CANCER (H): Status: ACTIVE | Noted: 2021-01-25

## 2021-01-25 PROBLEM — M1A.09X0 CHRONIC GOUT OF MULTIPLE SITES: Status: ACTIVE | Noted: 2019-05-21

## 2021-01-25 PROBLEM — J98.4 CAVITARY LESION OF LUNG: Status: ACTIVE | Noted: 2021-01-25

## 2021-01-25 LAB
ALBUMIN SERPL-MCNC: 2.6 G/DL (ref 3.4–5)
ALP SERPL-CCNC: 83 U/L (ref 40–150)
ALT SERPL W P-5'-P-CCNC: 16 U/L (ref 0–70)
ANION GAP SERPL CALCULATED.3IONS-SCNC: 7 MMOL/L (ref 3–14)
AST SERPL W P-5'-P-CCNC: 17 U/L (ref 0–45)
BASE EXCESS BLDV CALC-SCNC: 4.3 MMOL/L
BASOPHILS # BLD AUTO: 0 10E9/L (ref 0–0.2)
BASOPHILS NFR BLD AUTO: 0 %
BILIRUB SERPL-MCNC: 0.8 MG/DL (ref 0.2–1.3)
BUN SERPL-MCNC: 17 MG/DL (ref 7–30)
CALCIUM SERPL-MCNC: 9.1 MG/DL (ref 8.5–10.1)
CHLORIDE SERPL-SCNC: 90 MMOL/L (ref 94–109)
CO2 SERPL-SCNC: 31 MMOL/L (ref 20–32)
CREAT SERPL-MCNC: 0.64 MG/DL (ref 0.66–1.25)
CREAT SERPL-MCNC: 0.66 MG/DL (ref 0.66–1.25)
D DIMER PPP FEU-MCNC: 2.9 UG/ML FEU (ref 0–0.5)
DIFFERENTIAL METHOD BLD: ABNORMAL
EOSINOPHIL # BLD AUTO: 0 10E9/L (ref 0–0.7)
EOSINOPHIL NFR BLD AUTO: 0 %
ERYTHROCYTE [DISTWIDTH] IN BLOOD BY AUTOMATED COUNT: 15 % (ref 10–15)
FLUAV RNA RESP QL NAA+PROBE: NEGATIVE
FLUBV RNA RESP QL NAA+PROBE: NEGATIVE
GFR SERPL CREATININE-BSD FRML MDRD: >90 ML/MIN/{1.73_M2}
GFR SERPL CREATININE-BSD FRML MDRD: >90 ML/MIN/{1.73_M2}
GLUCOSE SERPL-MCNC: 148 MG/DL (ref 70–99)
HCO3 BLDV-SCNC: 33 MMOL/L (ref 21–28)
HCT VFR BLD AUTO: 38.5 % (ref 40–53)
HGB BLD-MCNC: 12.5 G/DL (ref 13.3–17.7)
INR PPP: 1.24 (ref 0.86–1.14)
LABORATORY COMMENT REPORT: NORMAL
LACTATE BLD-SCNC: 1.1 MMOL/L (ref 0.7–2)
LACTATE BLD-SCNC: 1.5 MMOL/L (ref 0.7–2)
LYMPHOCYTES # BLD AUTO: 1.2 10E9/L (ref 0.8–5.3)
LYMPHOCYTES NFR BLD AUTO: 5 %
MAGNESIUM SERPL-MCNC: 2.1 MG/DL (ref 1.6–2.3)
MAGNESIUM SERPL-MCNC: 2.6 MG/DL (ref 1.6–2.3)
MCH RBC QN AUTO: 28.5 PG (ref 26.5–33)
MCHC RBC AUTO-ENTMCNC: 32.5 G/DL (ref 31.5–36.5)
MCV RBC AUTO: 88 FL (ref 78–100)
METAMYELOCYTES # BLD: 0.9 10E9/L
METAMYELOCYTES NFR BLD MANUAL: 4 %
MONOCYTES # BLD AUTO: 1.4 10E9/L (ref 0–1.3)
MONOCYTES NFR BLD AUTO: 6 %
NEUTROPHILS # BLD AUTO: 19.6 10E9/L (ref 1.6–8.3)
NEUTROPHILS NFR BLD AUTO: 85 %
NT-PROBNP SERPL-MCNC: 820 PG/ML (ref 0–1800)
O2/TOTAL GAS SETTING VFR VENT: 50 %
PCO2 BLDV: 65 MM HG (ref 40–50)
PH BLDV: 7.31 PH (ref 7.32–7.43)
PLATELET # BLD AUTO: 162 10E9/L (ref 150–450)
PLATELET # BLD EST: ABNORMAL 10*3/UL
PO2 BLDV: 58 MM HG (ref 25–47)
POTASSIUM SERPL-SCNC: 4.4 MMOL/L (ref 3.4–5.3)
POTASSIUM SERPL-SCNC: 4.6 MMOL/L (ref 3.4–5.3)
PROT SERPL-MCNC: 7.4 G/DL (ref 6.8–8.8)
RBC # BLD AUTO: 4.39 10E12/L (ref 4.4–5.9)
RBC MORPH BLD: ABNORMAL
RSV RNA SPEC QL NAA+PROBE: NEGATIVE
SARS-COV-2 RNA RESP QL NAA+PROBE: NEGATIVE
SODIUM SERPL-SCNC: 128 MMOL/L (ref 133–144)
SPECIMEN SOURCE: NORMAL
TROPONIN I SERPL-MCNC: 0.02 UG/L (ref 0–0.04)
WBC # BLD AUTO: 23.1 10E9/L (ref 4–11)

## 2021-01-25 PROCEDURE — 96365 THER/PROPH/DIAG IV INF INIT: CPT | Mod: 59

## 2021-01-25 PROCEDURE — 85025 COMPLETE CBC W/AUTO DIFF WBC: CPT | Performed by: EMERGENCY MEDICINE

## 2021-01-25 PROCEDURE — 96366 THER/PROPH/DIAG IV INF ADDON: CPT

## 2021-01-25 PROCEDURE — C9803 HOPD COVID-19 SPEC COLLECT: HCPCS

## 2021-01-25 PROCEDURE — 250N000011 HC RX IP 250 OP 636: Performed by: EMERGENCY MEDICINE

## 2021-01-25 PROCEDURE — 83880 ASSAY OF NATRIURETIC PEPTIDE: CPT | Performed by: EMERGENCY MEDICINE

## 2021-01-25 PROCEDURE — 96375 TX/PRO/DX INJ NEW DRUG ADDON: CPT

## 2021-01-25 PROCEDURE — 85610 PROTHROMBIN TIME: CPT | Performed by: EMERGENCY MEDICINE

## 2021-01-25 PROCEDURE — 258N000003 HC RX IP 258 OP 636: Performed by: EMERGENCY MEDICINE

## 2021-01-25 PROCEDURE — 258N000003 HC RX IP 258 OP 636

## 2021-01-25 PROCEDURE — 999N000157 HC STATISTIC RCP TIME EA 10 MIN

## 2021-01-25 PROCEDURE — 36415 COLL VENOUS BLD VENIPUNCTURE: CPT | Performed by: INTERNAL MEDICINE

## 2021-01-25 PROCEDURE — 250N000009 HC RX 250: Performed by: EMERGENCY MEDICINE

## 2021-01-25 PROCEDURE — 87636 SARSCOV2 & INF A&B AMP PRB: CPT | Performed by: EMERGENCY MEDICINE

## 2021-01-25 PROCEDURE — 82803 BLOOD GASES ANY COMBINATION: CPT | Performed by: EMERGENCY MEDICINE

## 2021-01-25 PROCEDURE — 82565 ASSAY OF CREATININE: CPT | Performed by: INTERNAL MEDICINE

## 2021-01-25 PROCEDURE — 200N000001 HC R&B ICU

## 2021-01-25 PROCEDURE — 93005 ELECTROCARDIOGRAM TRACING: CPT

## 2021-01-25 PROCEDURE — 83735 ASSAY OF MAGNESIUM: CPT | Performed by: EMERGENCY MEDICINE

## 2021-01-25 PROCEDURE — 83605 ASSAY OF LACTIC ACID: CPT | Performed by: EMERGENCY MEDICINE

## 2021-01-25 PROCEDURE — 85379 FIBRIN DEGRADATION QUANT: CPT | Performed by: EMERGENCY MEDICINE

## 2021-01-25 PROCEDURE — 86481 TB AG RESPONSE T-CELL SUSP: CPT | Performed by: INTERNAL MEDICINE

## 2021-01-25 PROCEDURE — 250N000013 HC RX MED GY IP 250 OP 250 PS 637: Performed by: INTERNAL MEDICINE

## 2021-01-25 PROCEDURE — 83735 ASSAY OF MAGNESIUM: CPT | Performed by: INTERNAL MEDICINE

## 2021-01-25 PROCEDURE — 87040 BLOOD CULTURE FOR BACTERIA: CPT | Performed by: EMERGENCY MEDICINE

## 2021-01-25 PROCEDURE — 250N000011 HC RX IP 250 OP 636: Performed by: INTERNAL MEDICINE

## 2021-01-25 PROCEDURE — 96367 TX/PROPH/DG ADDL SEQ IV INF: CPT

## 2021-01-25 PROCEDURE — 99291 CRITICAL CARE FIRST HOUR: CPT | Performed by: INTERNAL MEDICINE

## 2021-01-25 PROCEDURE — 71045 X-RAY EXAM CHEST 1 VIEW: CPT

## 2021-01-25 PROCEDURE — 71275 CT ANGIOGRAPHY CHEST: CPT

## 2021-01-25 PROCEDURE — 94660 CPAP INITIATION&MGMT: CPT

## 2021-01-25 PROCEDURE — 250N000011 HC RX IP 250 OP 636

## 2021-01-25 PROCEDURE — 80053 COMPREHEN METABOLIC PANEL: CPT | Performed by: EMERGENCY MEDICINE

## 2021-01-25 PROCEDURE — 84484 ASSAY OF TROPONIN QUANT: CPT | Performed by: EMERGENCY MEDICINE

## 2021-01-25 PROCEDURE — 258N000003 HC RX IP 258 OP 636: Performed by: INTERNAL MEDICINE

## 2021-01-25 PROCEDURE — 99291 CRITICAL CARE FIRST HOUR: CPT | Mod: 25

## 2021-01-25 PROCEDURE — 84132 ASSAY OF SERUM POTASSIUM: CPT | Performed by: INTERNAL MEDICINE

## 2021-01-25 PROCEDURE — 5A09557 ASSISTANCE WITH RESPIRATORY VENTILATION, GREATER THAN 96 CONSECUTIVE HOURS, CONTINUOUS POSITIVE AIRWAY PRESSURE: ICD-10-PCS | Performed by: EMERGENCY MEDICINE

## 2021-01-25 PROCEDURE — 99292 CRITICAL CARE ADDL 30 MIN: CPT

## 2021-01-25 RX ORDER — SODIUM CHLORIDE 9 MG/ML
INJECTION, SOLUTION INTRAVENOUS CONTINUOUS
Status: DISCONTINUED | OUTPATIENT
Start: 2021-01-25 | End: 2021-01-27

## 2021-01-25 RX ORDER — AMOXICILLIN 250 MG
2 CAPSULE ORAL 2 TIMES DAILY PRN
Status: DISCONTINUED | OUTPATIENT
Start: 2021-01-25 | End: 2021-02-05 | Stop reason: HOSPADM

## 2021-01-25 RX ORDER — DOCUSATE SODIUM 100 MG/1
200 CAPSULE, LIQUID FILLED ORAL DAILY PRN
Status: DISCONTINUED | OUTPATIENT
Start: 2021-01-25 | End: 2021-02-02

## 2021-01-25 RX ORDER — CEFAZOLIN SODIUM 1 G/50ML
1750 SOLUTION INTRAVENOUS ONCE
Status: COMPLETED | OUTPATIENT
Start: 2021-01-25 | End: 2021-01-25

## 2021-01-25 RX ORDER — NALOXONE HYDROCHLORIDE 0.4 MG/ML
0.4 INJECTION, SOLUTION INTRAMUSCULAR; INTRAVENOUS; SUBCUTANEOUS
Status: DISCONTINUED | OUTPATIENT
Start: 2021-01-25 | End: 2021-02-02

## 2021-01-25 RX ORDER — MAGNESIUM SULFATE HEPTAHYDRATE 40 MG/ML
2 INJECTION, SOLUTION INTRAVENOUS ONCE
Status: COMPLETED | OUTPATIENT
Start: 2021-01-25 | End: 2021-01-25

## 2021-01-25 RX ORDER — ALBUTEROL SULFATE 90 UG/1
2 AEROSOL, METERED RESPIRATORY (INHALATION) EVERY 6 HOURS PRN
Status: DISCONTINUED | OUTPATIENT
Start: 2021-01-25 | End: 2021-01-28

## 2021-01-25 RX ORDER — BISACODYL 10 MG
10 SUPPOSITORY, RECTAL RECTAL DAILY PRN
Status: DISCONTINUED | OUTPATIENT
Start: 2021-01-25 | End: 2021-02-05 | Stop reason: HOSPADM

## 2021-01-25 RX ORDER — OXYCODONE HYDROCHLORIDE 5 MG/1
5-10 TABLET ORAL
Status: DISCONTINUED | OUTPATIENT
Start: 2021-01-25 | End: 2021-02-02

## 2021-01-25 RX ORDER — ACETAMINOPHEN 325 MG/1
650 TABLET ORAL EVERY 4 HOURS PRN
Status: DISCONTINUED | OUTPATIENT
Start: 2021-01-25 | End: 2021-02-05 | Stop reason: HOSPADM

## 2021-01-25 RX ORDER — NALOXONE HYDROCHLORIDE 0.4 MG/ML
0.2 INJECTION, SOLUTION INTRAMUSCULAR; INTRAVENOUS; SUBCUTANEOUS
Status: DISCONTINUED | OUTPATIENT
Start: 2021-01-25 | End: 2021-02-02

## 2021-01-25 RX ORDER — AMOXICILLIN 250 MG
1 CAPSULE ORAL 2 TIMES DAILY PRN
Status: DISCONTINUED | OUTPATIENT
Start: 2021-01-25 | End: 2021-02-05 | Stop reason: HOSPADM

## 2021-01-25 RX ORDER — METHYLPREDNISOLONE SODIUM SUCCINATE 125 MG/2ML
80 INJECTION, POWDER, LYOPHILIZED, FOR SOLUTION INTRAMUSCULAR; INTRAVENOUS ONCE
Status: COMPLETED | OUTPATIENT
Start: 2021-01-25 | End: 2021-01-25

## 2021-01-25 RX ORDER — IOPAMIDOL 755 MG/ML
66 INJECTION, SOLUTION INTRAVASCULAR ONCE
Status: COMPLETED | OUTPATIENT
Start: 2021-01-25 | End: 2021-01-25

## 2021-01-25 RX ORDER — MORPHINE SULFATE 2 MG/ML
1 INJECTION, SOLUTION INTRAMUSCULAR; INTRAVENOUS
Status: DISCONTINUED | OUTPATIENT
Start: 2021-01-25 | End: 2021-02-02

## 2021-01-25 RX ORDER — CEFEPIME HYDROCHLORIDE 1 G/1
1 INJECTION, POWDER, FOR SOLUTION INTRAMUSCULAR; INTRAVENOUS EVERY 8 HOURS
Status: DISCONTINUED | OUTPATIENT
Start: 2021-01-25 | End: 2021-01-26

## 2021-01-25 RX ORDER — IPRATROPIUM BROMIDE AND ALBUTEROL SULFATE 2.5; .5 MG/3ML; MG/3ML
3 SOLUTION RESPIRATORY (INHALATION)
Status: DISCONTINUED | OUTPATIENT
Start: 2021-01-25 | End: 2021-01-28

## 2021-01-25 RX ORDER — LIDOCAINE 40 MG/G
CREAM TOPICAL
Status: DISCONTINUED | OUTPATIENT
Start: 2021-01-25 | End: 2021-01-25

## 2021-01-25 RX ORDER — ONDANSETRON 2 MG/ML
4 INJECTION INTRAMUSCULAR; INTRAVENOUS EVERY 6 HOURS PRN
Status: DISCONTINUED | OUTPATIENT
Start: 2021-01-25 | End: 2021-02-05 | Stop reason: HOSPADM

## 2021-01-25 RX ORDER — FOLIC ACID 1 MG/1
1 TABLET ORAL DAILY
Status: DISCONTINUED | OUTPATIENT
Start: 2021-01-25 | End: 2021-02-02

## 2021-01-25 RX ORDER — IPRATROPIUM BROMIDE AND ALBUTEROL SULFATE 2.5; .5 MG/3ML; MG/3ML
1 SOLUTION RESPIRATORY (INHALATION) EVERY 6 HOURS PRN
COMMUNITY

## 2021-01-25 RX ORDER — PREDNISONE 2.5 MG/1
2.5 TABLET ORAL DAILY
Status: DISCONTINUED | OUTPATIENT
Start: 2021-01-25 | End: 2021-01-25

## 2021-01-25 RX ORDER — LIDOCAINE 40 MG/G
CREAM TOPICAL
Status: DISCONTINUED | OUTPATIENT
Start: 2021-01-25 | End: 2021-01-28

## 2021-01-25 RX ORDER — ONDANSETRON 4 MG/1
4 TABLET, ORALLY DISINTEGRATING ORAL EVERY 6 HOURS PRN
Status: DISCONTINUED | OUTPATIENT
Start: 2021-01-25 | End: 2021-02-05 | Stop reason: HOSPADM

## 2021-01-25 RX ORDER — PREDNISONE 5 MG/1
5 TABLET ORAL DAILY
Status: DISCONTINUED | OUTPATIENT
Start: 2021-01-26 | End: 2021-01-26

## 2021-01-25 RX ORDER — NITROGLYCERIN 0.4 MG/1
0.4 TABLET SUBLINGUAL EVERY 5 MIN PRN
Status: DISCONTINUED | OUTPATIENT
Start: 2021-01-25 | End: 2021-01-27

## 2021-01-25 RX ADMIN — UMECLIDINIUM 1 PUFF: 62.5 AEROSOL, POWDER ORAL at 17:46

## 2021-01-25 RX ADMIN — CEFEPIME HYDROCHLORIDE 1 G: 1 INJECTION, POWDER, FOR SOLUTION INTRAMUSCULAR; INTRAVENOUS at 20:15

## 2021-01-25 RX ADMIN — VANCOMYCIN HYDROCHLORIDE 1750 MG: 5 INJECTION, POWDER, LYOPHILIZED, FOR SOLUTION INTRAVENOUS at 11:39

## 2021-01-25 RX ADMIN — SODIUM CHLORIDE: 9 INJECTION, SOLUTION INTRAVENOUS at 17:46

## 2021-01-25 RX ADMIN — FOLIC ACID 1 MG: 1 TABLET ORAL at 17:49

## 2021-01-25 RX ADMIN — SODIUM CHLORIDE 91 ML: 9 INJECTION, SOLUTION INTRAVENOUS at 11:36

## 2021-01-25 RX ADMIN — METHYLPREDNISOLONE SODIUM SUCCINATE 81.25 MG: 125 INJECTION, POWDER, FOR SOLUTION INTRAMUSCULAR; INTRAVENOUS at 10:03

## 2021-01-25 RX ADMIN — VANCOMYCIN HYDROCHLORIDE 1500 MG: 5 INJECTION, POWDER, LYOPHILIZED, FOR SOLUTION INTRAVENOUS at 23:52

## 2021-01-25 RX ADMIN — ENOXAPARIN SODIUM 40 MG: 40 INJECTION SUBCUTANEOUS at 17:49

## 2021-01-25 RX ADMIN — FLUTICASONE FUROATE AND VILANTEROL TRIFENATATE 1 PUFF: 200; 25 POWDER RESPIRATORY (INHALATION) at 17:46

## 2021-01-25 RX ADMIN — MAGNESIUM SULFATE HEPTAHYDRATE 2 G: 40 INJECTION, SOLUTION INTRAVENOUS at 10:04

## 2021-01-25 RX ADMIN — IOPAMIDOL 66 ML: 755 INJECTION, SOLUTION INTRAVENOUS at 11:35

## 2021-01-25 RX ADMIN — CEFEPIME HYDROCHLORIDE 2 G: 2 INJECTION, POWDER, FOR SOLUTION INTRAVENOUS at 11:04

## 2021-01-25 ASSESSMENT — ACTIVITIES OF DAILY LIVING (ADL)
DRESSING/BATHING_DIFFICULTY: NO
ADLS_ACUITY_SCORE: 15
TOILETING_ISSUES: NO

## 2021-01-25 NOTE — PLAN OF CARE
Pt admitted to 270 from ED. Bipap in place 35% 12/6. Denies SOB at this time. Oral cares done. Cont to monitor.

## 2021-01-25 NOTE — H&P
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  CT of the chest indicates cavitary lesion of the lungs.    Active Problems:    Pneumonia of both lungs due to infectious organism, unspecified part of lung    Cavitary pneumonia    Lung cancer (H) POSSIBLE    End-stage COPD on Home O2 5 LPM  Acute on chronic hypoxic respiratory failure  --- Patient is on 5 L of oxygen at home chronically, he continues to smoke half pack per day, currently presenting with 1 week history of shortness of breath, patient had received her Covid vaccine in the last week.  ---CT of the chest shows left lower lobe atelectatic cyst and a larger masslike airspace opacity in the lateral left upper lobe, which is new, there is also a spiculated airspace opacity in the right middle lobe.  There is associated hilar and mediastinal adenopathy.  --- Patient has history of chronic cough, currently he has a cavitary lesion in the left side of the lungs, the differential diagnosis includes cavitary pneumonia versus malignancy, tuberculosis also a differential.  ---Admit patient to inpatient, with special precautions until Covid ruled out along with airborne isolation.  ---QuantiFERON gold ordered, will request pulmonary and infectious disease input, possibly patient would need bronchoscopy and biopsy.  ---Continue cefepime  and vancomycin, patient is allergic to penicillin, his white count is elevated influenza, RSV and SARS Cov negative.  --- Continue IV fluids, currently patient is on BiPAP, continue BiPAP on admission, admit to [facility]  --Continue his PTA inhalers, he is baseline on steroids, will continue that temporarily  Abdominal aortic aneurysm  ---CT study today indicates abdominal aortic aneurysm measuring  5.8 cm compared to 3.57 cm from 8/2/2013.  ---We will consult vascular surgery this admission.    Chronic gout of multiple sites  --- Does not appear to be active at this point.    Moderate Dementia   --- Patient is currently a poor historian, possibly due to respiratory distress.  Critical care time 40 minutes      DVT Prophylaxis: Enoxaparin (Lovenox) SQ  Code Status: Full Code, he had a recent visit with his primary care regarding this discussion and he had filled out a POLST saying that he would like full CODE STATUS.  This will need further discussion depending on his ongoing prognosis.    Disposition: Expected discharge in 2 to 3 days    Jazmine Lewis MD    Primary Care Physician   Eric Johnson    Chief Complaint   Shortness of breath 2 to 3 days    History is obtained from the patient and medical records    History of Present Illness   Jah Tate is a 76 year old male with history of COPD on 5 L of oxygen at home, dementia, gout presents to the emergency department with 1 week history of shortness of breath, he was brought in by EMS due to increased trouble breathing, he uses 5 L oxygen of oxygen at home, last week patient had received Covid vaccine first injection, since then he mentions his shortness of breath is worse, when I visited with the patient he was already on a BiPAP and was not able to give much history, history obtained from the EMS records, ER physician records and other medical records.  Patient lives at home when he gets home care support CTA done here indicates a large cavitary lesion in the left upper lobe along with atelectatic cyst in the right spiculated lesion, he also have a reactive lymphadenopathy.  He denies any fever at home, denies any nausea or vomiting, ongoing cough present, patient had to be placed on BiPAP upon arrival to the emergency department, currently sats are stable at 40 percentage FiO2 with BiPAP.    In the emergency department multiple labs were  obtained, D-dimer was elevated which led to obtaining a CT of his chest, his INR is 1.24, white count is 20 3K with absolute neutrophil count above 19.6K, sodium was low at 128, albumin 2.6, troponins were negative and BNP was within normal limit, lactic acid levels are within normal limit.  I reviewed his record and he had recently made a POLST form which shows he want to be full code.  Past Medical History    I have reviewed this patient's medical history and updated it with pertinent information if needed.   Past Medical History:   Diagnosis Date     Bilateral lower extremity edema      COPD (chronic obstructive pulmonary disease) (H)      Dementia (H)      Gout        Past Surgical History   I have reviewed this patient's surgical history and updated it with pertinent information if needed.  Past Surgical History:   Procedure Laterality Date     FRACTURE TX, ANKLE RT/LT      left- w/plate       Prior to Admission Medications   Prior to Admission Medications   Prescriptions Last Dose Informant Patient Reported? Taking?   INCRUSE ELLIPTA 62.5 MCG/INH Inhaler   No No   Sig: TAKE 1 PUFF BY MOUTH EVERY DAY   albuterol (PROAIR HFA/PROVENTIL HFA/VENTOLIN HFA) 108 (90 Base) MCG/ACT inhaler   No No   Sig: INHALE 2 PUFFS BY MOUTH EVERY 6 HOURS AS NEEDED FOR SHORTNESS OF BREATH OR WHEEZING.   aspirin 81 MG tablet  Self Yes No   Sig: Take 1 tablet by mouth daily   colchicine (COLCYRS) 0.6 MG tablet   No No   Sig: Take 1 tablet (0.6 mg) by mouth 2 times daily as needed for moderate pain TAKE 1 TABLET BY MOUTH TWICE A DAY   Patient not taking: Reported on 9/1/2020   docusate sodium (DULCOLAX STOOL SOFTENER) 100 MG capsule  Self Yes No   Sig: Take 2 capsules by mouth daily as needed    fluticasone-vilanterol (BREO ELLIPTA) 200-25 MCG/INH inhaler   No No   Sig: Inhale 1 puff into the lungs daily   folic acid (FOLVITE) 1 MG tablet   No No   Sig: Take 1 tablet (1 mg) by mouth daily   ipratropium - albuterol 0.5 mg/2.5 mg/3 mL  (DUONEB) 0.5-2.5 (3) MG/3ML neb solution   No No   Sig: Take 1 vial (3 mLs) by nebulization every 6 hours as needed for shortness of breath / dyspnea or wheezing   predniSONE (DELTASONE) 2.5 MG tablet   Yes No   Sig: Take 2.5 mg by mouth daily   predniSONE (DELTASONE) 5 MG tablet   Yes No   Sig: Take 5 mg by mouth daily   vitamin D3 (CHOLECALCIFEROL) 2000 units (50 mcg) tablet   Yes No   Sig: Take 1 tablet by mouth daily      Facility-Administered Medications: None     Allergies   Allergies   Allergen Reactions     Penicillins Swelling     Has tolerated Keflex, cefuroxime     Sulfa Drugs Swelling     Chantix [Varenicline Tartrate]      Hallucinations         Social History   I have reviewed this patient's social history and updated it with pertinent information if needed. Jah Tate  reports that he has been smoking cigarettes. He has a 30.00 pack-year smoking history. He has never used smokeless tobacco. He reports that he does not drink alcohol or use drugs.    Family History   I have reviewed this patient's family history and updated it with pertinent information if needed.     Review of Systems   The 10 point Review of Systems is negative other than noted in the HPI or here.    Physical Exam   Temp: 98.7  F (37.1  C) Temp src: Temporal BP: 102/54 Pulse: 96   Resp: 19 SpO2: 96 % O2 Device: BiPAP/CPAP    Vital Signs with Ranges  Temp:  [98.7  F (37.1  C)] 98.7  F (37.1  C)  Pulse:  [] 96  Resp:  [16-28] 19  BP: (102-169)/(54-88) 102/54  FiO2 (%):  [40 %-50 %] 40 %  SpO2:  [94 %-97 %] 96 %  174 lbs 0 oz    Constitutional: He is quite sleepy on BiPAP, thin built  Eyes: Conjunctiva and pupils examined and normal.  HEENT: Moist mucous membranes, normal dentition.  Respiratory: Breath sounds reduced on the left side, scattered wheezes heard bilaterally  Cardiovascular: Regular rate and rhythm, normal S1 and S2, and no murmur noted.  GI: Soft, non-distended, non-tender, normal bowel  sounds.  Lymph/Hematologic: No anterior cervical or supraclavicular adenopathy.  Skin: No rashes, no cyanosis, no edema.  Musculoskeletal: No joint swelling, erythema or tenderness.  Neurologic: Cranial nerves 2-12 intact, normal strength and sensation.  Psychiatric: Very sleepy, occasionally confused    Data   Data reviewed today:  I personally reviewed labs and imaging as below.  Recent Labs   Lab 01/25/21  0948   WBC 23.1*   HGB 12.5*   MCV 88      INR 1.24*   *   POTASSIUM 4.4   CHLORIDE 90*   CO2 31   BUN 17   CR 0.66   ANIONGAP 7   SHEEBA 9.1   *   ALBUMIN 2.6*   PROTTOTAL 7.4   BILITOTAL 0.8   ALKPHOS 83   ALT 16   AST 17   TROPI 0.024       Imaging:  Recent Results (from the past 24 hour(s))   XR Chest Port 1 View    Narrative    CHEST ONE VIEW PORTABLE   1/25/2021 10:18 AM     HISTORY: Respiratory distress.    COMPARISON: 6/28/2020      Impression    IMPRESSION: There is masslike abnormal airspace opacity in the mid  left lung that is new compared to the prior study and associated with  a small left pleural effusion. There is also a trace right pleural  effusion. Findings are suggestive of pneumonia with parapneumonic  effusion. Continued radiographic follow-up until complete resolution  is recommended.    ORLANDO KRUSE MD   CT Chest Pulmonary Embolism w Contrast    Narrative    CT CHEST PULMONARY EMBOLISM WITH CONTRAST 1/25/2021 11:36 AM    CLINICAL HISTORY: Pulmonary embolus suspected, low/intermediate  probability, positive D-dimer; mass-like lesion and fluid on chest  x-ray.    TECHNIQUE: CT angiogram chest during arterial phase injection IV  contrast. 2D and 3D MIP reconstructions were performed by the CT  technologist. Dose reduction techniques were used.     CONTRAST: 66mL Isovue-370    COMPARISON: 6/26/2020, 4/6/2020, 8/2/2013    FINDINGS:  ANGIOGRAM CHEST: Pulmonary arteries are normal caliber and negative  for pulmonary emboli. Thoracic aorta is normal in caliber and  negative  for dissection.    LUNGS AND PLEURA: Lungs are emphysematous. There is complete  atelectasis of the left lower lobe some small air bronchograms. There  is masslike airspace opacity in the pleural-based left upper lobe  without air bronchograms that measures 5.9 x 7.6 cm. There is a  spiculated airspace opacity in the central right middle lobe that  measures 2.3 x 1.2 cm (series 8, image 227). There is also mucous  plugging and volume loss in the right lower lobe. A small to  moderate-sized left pleural effusion is present.    MEDIASTINUM/AXILLAE: There is mediastinal and hilar adenopathy. A  dominant right hilar lymph node measures 2.5 x 1.8 cm (series 6, image  123). An enlarged AP window lymph node measures 1.8 x 1.9 cm (series  6, image 61). No axillary adenopathy. Thyroid gland is unremarkable.    UPPER ABDOMEN: Incompletely imaged infrarenal abdominal aortic  aneurysm measures up to 5.8 cm (series 6, image 216).    MUSCULOSKELETAL: No destructive bone lesions.      Impression    IMPRESSION:  1.  Left lower lobe atelectasis was present on the prior study and is  not significantly changed. Large masslike airspace opacity in the  lateral left upper lobe is new and likely infectious given its rapid  development. The spiculated airspace opacity in the right middle lobe  is also completely new compared to the prior study and more likely to  represent infection. Hilar and mediastinal adenopathy is assumed to be  reactive. The possibility of aggressive malignancy cannot be entirely  excluded. Consider bronchoscopy.  2.  Infrarenal abdominal aortic aneurysm measures 5.8 cm compared to  3.7 cm on 8/2/2013. Vascular surgery referral recommended.    ORLANDO KRUSE MD

## 2021-01-25 NOTE — ED PROVIDER NOTES
History   Chief Complaint:  Shortness of Breath       The history is provided by the EMS personnel. History limited by: dementia.      Jah Tate is a 76 year old male with history of COPD and dementia who presents with shortness of breath. Per EMS report the patient received the COVID-19 vaccine last week and since then he ha shad worsening shortness of breath. En route he was initally at 72% saturation, was put on biPAP and given 1 albuterol nebulizer and his saturation increased to 94%. He is normally on 2 L oxygen. He denies chest pain or abdominal pain.     Review of Systems   Unable to perform ROS: Dementia   and acuity of condition     Allergies:  Penicillins  Sulfa Drugs  Chantix    Medications:  Albuterol inhlaer  Aspirin  Colchicine  Incruse ellipta  Duoneb   Prednisone    Past Medical History:    Bilateral lower extremity edema  COPD  Dementia  Gout   AAA    Past Surgical History:    Left ankle fracture     Social History:  The patient presents alone. Lives in a care facility.    Physical Exam     Patient Vitals for the past 24 hrs:   BP Temp Temp src Pulse Resp SpO2 Weight   01/25/21 1230 122/72 -- -- 102 18 97 % --   01/25/21 1200 107/68 -- -- 107 21 95 % --   01/25/21 1145 126/64 -- -- 112 22 95 % --   01/25/21 1115 (!) 143/74 -- -- 114 25 96 % --   01/25/21 1100 (!) 147/77 -- -- 115 26 95 % --   01/25/21 1045 (!) 147/88 -- -- 118 21 94 % --   01/25/21 1040 (!) 147/88 -- -- 117 26 94 % --   01/25/21 1030 132/73 -- -- 114 26 95 % --   01/25/21 1020 (!) 169/87 -- -- 120 26 94 % --   01/25/21 1010 (!) 166/87 -- -- 120 24 94 % --   01/25/21 1000 -- -- -- 121 26 95 % --   01/25/21 0950 -- -- -- 123 28 94 % --   01/25/21 0942 (!) 152/87 98.7  F (37.1  C) Temporal 122 16 94 % 78.9 kg (174 lb)   01/25/21 0940 (!) 152/87 -- -- 123 -- 95 % --       Physical Exam  Constitutional: Well developed, toxic appearance, BiPAP in place  Head: Atraumatic.   Neck:  no stridor  Eyes: no scleral  icterus  Cardiovascular: Regular tach, 2+ bilat radial pulses  Pulmonary/Chest: Increased respiratory effort with tachypnea and accessory muscle usage, diminished breath sounds bilaterally  Abdominal: ND, soft, NT, no rebound or guarding   Ext: Warm, well perfused, no edema  Neurological: A&O, symmetric facies, moves ext x4  Skin: Skin is warm and dry.   Psychiatric: Behavior is normal. Thought content normal.   Nursing note and vitals reviewed.    Emergency Department Course     ECG:  @ 0954  Indication: shortness of breath   Vent. Rate 122 bpm. UT interval 124 ms. QRS duration 82 ms. QT/QTc 320/456 ms. P-R-T axis 76 19 55.   Poor data quality, interpretation may be adversely affected. Sinus tachycardia with occasional premature ventriclar complexes. Left atrial enlargement. Anterior infarct, age undetermined. Abnormal ECG.    Read @ 0958 by Dr. Mcgregor.      Imaging:  Chest XR Port 1 View:  IMPRESSION: There is masslike abnormal airspace opacity in the mid  left lung that is new compared to the prior study and associated with  a small left pleural effusion. There is also a trace right pleural  effusion. Findings are suggestive of pneumonia with parapneumonic  effusion. Continued radiographic follow-up until complete resolution  is recommended.  Reading per radiology.     CT Chest Pulmonary Embolism w/ Contrast  IMPRESSION:  1.  Left lower lobe atelectasis was present on the prior study and is  not significantly changed. Large masslike airspace opacity in the  lateral left upper lobe is new and likely infectious given its rapid  development. The spiculated airspace opacity in the right middle lobe  is also completely new compared to the prior study and more likely to  represent infection. Hilar and mediastinal adenopathy is assumed to be  reactive. The possibility of aggressive malignancy cannot be entirely  excluded. Consider bronchoscopy.  2.  Infrarenal abdominal aortic aneurysm measures 5.8 cm compared to  3.7  eugenia on 2013. Vascular surgery referral recommended.  Reading per radiology.      Laboratory:  CBC: WBC 23.1 (H), HGB 12.5 (L),      CMP: Glucose 148 (H), Sodium: 128 (L), Chloride: 90 (L), Albumin: 2.6, o/w WNL (Creatinine: 0.66)    1009 Lactic acid whole blood: 1.5  1059 Lactic acid: 1.1     Blood gas venous: pH 7.31 (L) PCO2 65 (H) PO2 58 (H) Bicarbonate 33 (H) Base Excess 4.3 FlO2 50.         Nt probnp inpatient (BNP): 820    0948 Troponin I: 0.024     D-dimer: 2.9 (H)    INR: 1.24 (H)    Magnesium: 2.1     Symptomatic Influenza A/B & SARS-CoV2 (COVID-19) Virus PCR Multiplex: In process     1016 Blood culture: In process  1050 Blood culture: In process    Emergency Department Course:    Reviewed:  I reviewed nursing notes, vitals and past medical history    Assessments:  933 I obtained history and examined the patient as noted above.   1054 I rechecked the patient and explained findings.     Consults:   1235  I consulted with Dr. Lewis of the hospitalist services. They are in agreement to accept the patient for admission.       Interventions:  1003 Solu-Medrol 81.25 mg IV  1004 Magnesium sulfate 2 g in water intermittent infusion IV  1137 Cefepime 2 g vial to attach to  mL bag IV  1139 Vancomycin 1750 in sodium chloride 0.9% 500 mL IV     Disposition:  The patient was admitted to the hospital under the care of Dr. Lewis.       Impression & Plan     CMS Diagnoses: None    Medical Decision Makin year old male presenting w/ respiratory distress     DDx includes viral syndrome NOS, influenza-like illness, influenza, COVID-19, COPD exacerbation, PE, pneumothorax, CHF, pneumonia.  BiPAP continued in the emergency department after EMS arrival.  COPD exacerbation medications given as noted above including duo nebs and site Medrol.  EKG interpretation somewhat limited secondary to artifact due to respiratory distress without obvious acute ischemic findings. Labs significant for leukocytosis,  hyponatremia, mildly elevated INR, D-dimer elevated, troponin within normal limits.  COVID-19 test ordered and pending.  Imaging sig for masslike abnormal airspace opacity concerning for possible infection with parapneumonic effusion.    Blood cultures were drawn and the patient was given broad-spectrum antibiotics.  Upon recheck he seems to be improving in regard to work of breathing and heart rate..  He is was admitted to the hospitalist service under IMC status for further evaluation and management.  CODE STATUS reviewed with the patient prior to admission and he is full code.   Patient counseled on all results, disposition and diagnosis.  They are understanding and agreeable to plan. Patient admitted in guarded condition.      Critical care time: 40 minutes excluding procedures    Covid-19  Jah Tate was evaluated during a global COVID-19 pandemic, which necessitated consideration that the patient might be at risk for infection with the SARS-CoV-2 virus that causes COVID-19.   Applicable protocols for evaluation were followed during the patient's care.   COVID-19 was considered as part of the patient's evaluation. The plan for testing is:  a test was obtained during this visit.    Diagnosis:    ICD-10-CM    1. Pneumonia of both lungs due to infectious organism, unspecified part of lung  J18.9    2. COPD exacerbation (H)  J44.1        Scribe Disclosure:  Janey JONAS, am serving as a scribe at 9:33 AM on 1/25/2021 to document services personally performed by Gomez Mcgregor MD based on my observations and the provider's statements to me.           Gomez Mcgregor MD  01/26/21 0009

## 2021-01-25 NOTE — PHARMACY-VANCOMYCIN DOSING SERVICE
Pharmacy Vancomycin Initial Note  Date of Service 2021  Patient's  1945  76 year old, male    Indication: Community Acquired Pneumonia    Current estimated CrCl = Estimated Creatinine Clearance: 102.9 mL/min (based on SCr of 0.66 mg/dL).    Creatinine for last 3 days  2021:  9:48 AM Creatinine 0.66 mg/dL    Recent Vancomycin Level(s) for last 3 days  No results found for requested labs within last 72 hours.      Vancomycin IV Administrations (past 72 hours)                   vancomycin (VANCOCIN) 1,750 mg in sodium chloride 0.9 % 500 mL intermittent infusion (mg) 1,750 mg Given 21 1139                Nephrotoxins and other renal medications (From now, onward)    Start     Dose/Rate Route Frequency Ordered Stop    21 0000  vancomycin 1250 mg in 0.9% NaCl 250 mL intermittent infusion 1,250 mg      1,250 mg  over 90 Minutes Intravenous EVERY 12 HOURS 21 1451            Contrast Orders - past 72 hours (72h ago, onward)    Start     Dose/Rate Route Frequency Ordered Stop    21 1115  iopamidol (ISOVUE-370) solution 66 mL      66 mL Intravenous ONCE 21 1114 21 1135                Plan:  1.  Start vancomycin  1250 mg IV q12h.   2.  Goal Trough Level: 10-15 mg/L   3.  Pharmacy will check trough levels as appropriate in 1-3 Days.    4. Serum creatinine levels will be ordered daily for the first week of therapy and at least twice weekly for subsequent weeks.    5. [place] method utilized to dose vancomycin therapy: Method 1    Katie Ruelas [facility]

## 2021-01-25 NOTE — ED TRIAGE NOTES
Pt lives at [facility] living. Pt has been SOB per family since his first covid vaccine. Pt has copd and normally is on 2 lpm 24/7. Pt o2 sats 72% on medics arrival. They put him on bipap and gave him nebs. Sats on arrival 95%

## 2021-01-25 NOTE — PHARMACY-ADMISSION MEDICATION HISTORY
Pharmacy Medication History  Admission medication history interview status for the 1/25/2021  admission is complete. See EPIC admission navigator for prior to admission medications       Medication history sources: [facility] [phone][facility]  Medication history source reliability: Good  Adherence assessment: Moderate    Significant changes made to the medication list:        In the past week, patient estimated taking medication this percent of the time: 50-90% due to illness    Additional medication history information:       Medication reconciliation completed by provider prior to medication history? No    Time spent in this activity: 15min       Prior to Admission medications    Medication Sig Last Dose Taking? Auth Provider   aspirin 81 MG tablet Take 1 tablet by mouth daily 1/24/2021 at Unknown time Yes Reported, Patient   docusate sodium (DULCOLAX STOOL SOFTENER) 100 MG capsule Take 2 capsules by mouth daily  1/24/2021 at Unknown time Yes Reported, Patient   fluticasone-vilanterol (BREO ELLIPTA) 200-25 MCG/INH inhaler Inhale 1 puff into the lungs daily 1/24/2021 at Unknown time Yes Eric Johnson MD   folic acid (FOLVITE) 1 MG tablet Take 1 tablet (1 mg) by mouth daily 1/24/2021 at Unknown time Yes Elvira Hilliard APRN CNP   INCRUSE ELLIPTA 62.5 MCG/INH Inhaler TAKE 1 PUFF BY MOUTH EVERY DAY 1/24/2021 at Unknown time Yes Elvira Hilliard APRN CNP   ipratropium - albuterol 0.5 mg/2.5 mg/3 mL (DUONEB) 0.5-2.5 (3) MG/3ML neb solution Take 1 vial by nebulization every 6 hours as needed   Yes Unknown, Entered By History   predniSONE (DELTASONE) 5 MG tablet Take 5 mg by mouth daily 1/25/2021 at Unknown time Yes Reported, Patient   vitamin D3 (CHOLECALCIFEROL) 2000 units (50 mcg) tablet Take 1 tablet by mouth daily 1/24/2021 at Unknown time Yes Reported, Patient   albuterol (PROAIR HFA/PROVENTIL HFA/VENTOLIN HFA) 108 (90 Base) MCG/ACT inhaler INHALE 2 PUFFS BY MOUTH EVERY 6 HOURS AS  NEEDED FOR SHORTNESS OF BREATH OR WHEEZING.   Tim Siegel MD Marci Jacobson PharmD

## 2021-01-26 ENCOUNTER — APPOINTMENT (OUTPATIENT)
Dept: CT IMAGING | Facility: CLINIC | Age: 76
DRG: 193 | End: 2021-01-26
Payer: COMMERCIAL

## 2021-01-26 LAB
ANION GAP SERPL CALCULATED.3IONS-SCNC: 4 MMOL/L (ref 3–14)
BACTERIA SPEC CULT: ABNORMAL
BACTERIA SPEC CULT: ABNORMAL
BUN SERPL-MCNC: 21 MG/DL (ref 7–30)
CALCIUM SERPL-MCNC: 8.6 MG/DL (ref 8.5–10.1)
CHLORIDE SERPL-SCNC: 100 MMOL/L (ref 94–109)
CO2 SERPL-SCNC: 30 MMOL/L (ref 20–32)
CREAT SERPL-MCNC: 0.64 MG/DL (ref 0.66–1.25)
ERYTHROCYTE [DISTWIDTH] IN BLOOD BY AUTOMATED COUNT: 15.1 % (ref 10–15)
GAMMA INTERFERON BACKGROUND BLD IA-ACNC: 0.03 IU/ML
GFR SERPL CREATININE-BSD FRML MDRD: >90 ML/MIN/{1.73_M2}
GLUCOSE BLDC GLUCOMTR-MCNC: 111 MG/DL (ref 70–99)
GLUCOSE BLDC GLUCOMTR-MCNC: 111 MG/DL (ref 70–99)
GLUCOSE BLDC GLUCOMTR-MCNC: 125 MG/DL (ref 70–99)
GLUCOSE SERPL-MCNC: 132 MG/DL (ref 70–99)
GRAM STN SPEC: ABNORMAL
HCT VFR BLD AUTO: 33.3 % (ref 40–53)
HGB BLD-MCNC: 10.6 G/DL (ref 13.3–17.7)
Lab: ABNORMAL
M TB IFN-G CD4+ BCKGRND COR BLD-ACNC: 0.9 IU/ML
M TB TUBERC IFN-G BLD QL: NEGATIVE
MCH RBC QN AUTO: 28.4 PG (ref 26.5–33)
MCHC RBC AUTO-ENTMCNC: 31.8 G/DL (ref 31.5–36.5)
MCV RBC AUTO: 89 FL (ref 78–100)
MITOGEN IGNF BCKGRD COR BLD-ACNC: 0 IU/ML
MITOGEN IGNF BCKGRD COR BLD-ACNC: 0 IU/ML
PLATELET # BLD AUTO: 151 10E9/L (ref 150–450)
POTASSIUM SERPL-SCNC: 4.8 MMOL/L (ref 3.4–5.3)
RBC # BLD AUTO: 3.73 10E12/L (ref 4.4–5.9)
SODIUM SERPL-SCNC: 134 MMOL/L (ref 133–144)
SPECIMEN SOURCE: ABNORMAL
SPECIMEN SOURCE: ABNORMAL
WBC # BLD AUTO: 12.9 10E9/L (ref 4–11)

## 2021-01-26 PROCEDURE — 80048 BASIC METABOLIC PNL TOTAL CA: CPT | Performed by: INTERNAL MEDICINE

## 2021-01-26 PROCEDURE — 250N000012 HC RX MED GY IP 250 OP 636 PS 637: Performed by: INTERNAL MEDICINE

## 2021-01-26 PROCEDURE — 87040 BLOOD CULTURE FOR BACTERIA: CPT | Performed by: EMERGENCY MEDICINE

## 2021-01-26 PROCEDURE — 99233 SBSQ HOSP IP/OBS HIGH 50: CPT | Performed by: HOSPITALIST

## 2021-01-26 PROCEDURE — 250N000011 HC RX IP 250 OP 636: Performed by: INTERNAL MEDICINE

## 2021-01-26 PROCEDURE — 87205 SMEAR GRAM STAIN: CPT | Performed by: INTERNAL MEDICINE

## 2021-01-26 PROCEDURE — 250N000009 HC RX 250: Performed by: INTERNAL MEDICINE

## 2021-01-26 PROCEDURE — 999N001017 HC STATISTIC GLUCOSE BY METER IP

## 2021-01-26 PROCEDURE — 36415 COLL VENOUS BLD VENIPUNCTURE: CPT | Performed by: INTERNAL MEDICINE

## 2021-01-26 PROCEDURE — 74174 CTA ABD&PLVS W/CONTRAST: CPT

## 2021-01-26 PROCEDURE — 250N000011 HC RX IP 250 OP 636: Performed by: HOSPITALIST

## 2021-01-26 PROCEDURE — 250N000013 HC RX MED GY IP 250 OP 250 PS 637: Performed by: HOSPITALIST

## 2021-01-26 PROCEDURE — 87077 CULTURE AEROBIC IDENTIFY: CPT | Performed by: INTERNAL MEDICINE

## 2021-01-26 PROCEDURE — 200N000001 HC R&B ICU

## 2021-01-26 PROCEDURE — 250N000013 HC RX MED GY IP 250 OP 250 PS 637: Performed by: INTERNAL MEDICINE

## 2021-01-26 PROCEDURE — 87186 SC STD MICRODIL/AGAR DIL: CPT | Performed by: INTERNAL MEDICINE

## 2021-01-26 PROCEDURE — 250N000012 HC RX MED GY IP 250 OP 636 PS 637: Performed by: HOSPITALIST

## 2021-01-26 PROCEDURE — 258N000003 HC RX IP 258 OP 636

## 2021-01-26 PROCEDURE — 87070 CULTURE OTHR SPECIMN AEROBIC: CPT | Performed by: INTERNAL MEDICINE

## 2021-01-26 PROCEDURE — 258N000003 HC RX IP 258 OP 636: Performed by: INTERNAL MEDICINE

## 2021-01-26 PROCEDURE — 250N000011 HC RX IP 250 OP 636

## 2021-01-26 PROCEDURE — 85027 COMPLETE CBC AUTOMATED: CPT | Performed by: INTERNAL MEDICINE

## 2021-01-26 RX ORDER — PREDNISONE 20 MG/1
40 TABLET ORAL DAILY
Status: DISCONTINUED | OUTPATIENT
Start: 2021-01-26 | End: 2021-01-29

## 2021-01-26 RX ORDER — IOPAMIDOL 755 MG/ML
80 INJECTION, SOLUTION INTRAVASCULAR ONCE
Status: COMPLETED | OUTPATIENT
Start: 2021-01-26 | End: 2021-01-26

## 2021-01-26 RX ORDER — QUETIAPINE FUMARATE 25 MG/1
25 TABLET, FILM COATED ORAL 3 TIMES DAILY PRN
Status: DISCONTINUED | OUTPATIENT
Start: 2021-01-26 | End: 2021-02-02

## 2021-01-26 RX ADMIN — SODIUM CHLORIDE, PRESERVATIVE FREE 10 ML: 5 INJECTION INTRAVENOUS at 14:28

## 2021-01-26 RX ADMIN — FOLIC ACID 1 MG: 1 TABLET ORAL at 08:53

## 2021-01-26 RX ADMIN — METRONIDAZOLE 500 MG: 500 INJECTION, SOLUTION INTRAVENOUS at 14:23

## 2021-01-26 RX ADMIN — QUETIAPINE 25 MG: 25 TABLET ORAL at 11:54

## 2021-01-26 RX ADMIN — FLUTICASONE FUROATE AND VILANTEROL TRIFENATATE 1 PUFF: 200; 25 POWDER RESPIRATORY (INHALATION) at 08:59

## 2021-01-26 RX ADMIN — ALBUTEROL SULFATE 2 PUFF: 90 AEROSOL, METERED RESPIRATORY (INHALATION) at 12:02

## 2021-01-26 RX ADMIN — IOPAMIDOL 80 ML: 755 INJECTION, SOLUTION INTRAVENOUS at 13:08

## 2021-01-26 RX ADMIN — VANCOMYCIN HYDROCHLORIDE 1500 MG: 5 INJECTION, POWDER, LYOPHILIZED, FOR SOLUTION INTRAVENOUS at 12:03

## 2021-01-26 RX ADMIN — CEFEPIME HYDROCHLORIDE 2 G: 2 INJECTION, POWDER, FOR SOLUTION INTRAVENOUS at 20:43

## 2021-01-26 RX ADMIN — PREDNISONE 40 MG: 20 TABLET ORAL at 11:54

## 2021-01-26 RX ADMIN — ALBUTEROL SULFATE 2 PUFF: 90 AEROSOL, METERED RESPIRATORY (INHALATION) at 03:38

## 2021-01-26 RX ADMIN — CEFEPIME HYDROCHLORIDE 2 G: 2 INJECTION, POWDER, FOR SOLUTION INTRAVENOUS at 11:55

## 2021-01-26 RX ADMIN — SODIUM CHLORIDE: 9 INJECTION, SOLUTION INTRAVENOUS at 03:01

## 2021-01-26 RX ADMIN — CEFEPIME HYDROCHLORIDE 1 G: 1 INJECTION, POWDER, FOR SOLUTION INTRAMUSCULAR; INTRAVENOUS at 03:00

## 2021-01-26 RX ADMIN — ACETAMINOPHEN 650 MG: 325 TABLET, FILM COATED ORAL at 23:08

## 2021-01-26 RX ADMIN — PREDNISONE 5 MG: 5 TABLET ORAL at 08:53

## 2021-01-26 RX ADMIN — QUETIAPINE 25 MG: 25 TABLET ORAL at 20:52

## 2021-01-26 RX ADMIN — VANCOMYCIN HYDROCHLORIDE 1500 MG: 5 INJECTION, POWDER, LYOPHILIZED, FOR SOLUTION INTRAVENOUS at 23:10

## 2021-01-26 RX ADMIN — UMECLIDINIUM 1 PUFF: 62.5 AEROSOL, POWDER ORAL at 09:00

## 2021-01-26 ASSESSMENT — ACTIVITIES OF DAILY LIVING (ADL)
ADLS_ACUITY_SCORE: 15

## 2021-01-26 NOTE — PROGRESS NOTES
"Vascular Surgery Consultation     Jah Tate MRN# [id number]   YOB: 1945 Age: 76 year old   Date of Admission: 1/25/2021     Reason for consult: Infrarenal abdominal aortic aneurysm                   Reason for Consult:   Abdominal aortic aneurysm  History is obtained primarily from the chart. Mr. Tate is able to provide only a limited amount of information due to his confusion and relative extremis (dyspneic at rest, having difficulty carrying on a conversation due to SOB)         History of Present Illness:   Mr. Tate is a 76 year old male who presents with acute on chronic hypoxic respiratory failure.     He has history of COPD, on 5L O2 at home continuously and with recent PFTs demonstrating FEV1 of 27% predicted (see Pulmonology note of 8/14/20). He has additionally been found to have left lung changes suggestive of cavitary pneumonia vs malignancy. On his admission chest CT, he was noted to have enlargement of an infrarenal abdominal aortic aneurysm, last noted several years ago.    He states he does not recall ever being told he had an aneurysm. He normally follows with [facility]. He has not had back or abdominal pain. He has no pain in his legs with walking, but notes that he cannot walk far due to ALONSO. He can make it slowly around his house; he does not \"go for walks\" and does not do his own grocery shopping any more. He does not have stairs at home.       He does carry a diagnosis of dementia. He is able to tell me that it is January 2021 and that our president is Kaushik; he does not recall why he is in the hospital and is somewhat frustrated by this.           Past Medical History:   I have reviewed and updated this patient's past medical history  Past Medical History:   Diagnosis Date     Bilateral lower extremity edema      COPD (chronic obstructive pulmonary disease) (H)      Dementia (H)      Gout              Past Surgical History:   I have reviewed " this patient's past surgical history  Past Surgical History:   Procedure Laterality Date     FRACTURE TX, ANKLE RT/LT      left- w/plate   Tonsillectomy         Social History:     Social History     Tobacco Use     Smoking status: Current Every Day Smoker     Packs/day: 0.50     Years: 60.00     Pack years: 30.00     Types: Cigarettes     Smokeless tobacco: Never Used   Substance Use Topics     Alcohol use: No     Comment: 4-5 drinks per month   He smoked 1+ ppd for 50 years. He tells me he quit smoking 3 months ago when he was unable to breathe. He was previously an .           Family History:   No family history on file.--unable to obtain further information from him          Allergies:     Allergies   Allergen Reactions     Penicillins Swelling     Has tolerated Keflex, cefuroxime     Sulfa Drugs Swelling     Chantix [Varenicline Tartrate]      Hallucinations               Medications:     Medication Dose     acetaminophen (TYLENOL) tablet 650 mg  650 mg     albuterol (PROAIR HFA/PROVENTIL HFA/VENTOLIN HFA) 108 (90 Base) MCG/ACT inhaler 2 puff  2 puff     bisacodyl (DULCOLAX) Suppository 10 mg  10 mg     ceFEPIme (MAXIPIME) 2 g vial to attach to  ml bag for ADULTS or 50 ml bag for PEDS  2 g     docusate sodium (COLACE) capsule 200 mg  200 mg     fluticasone-vilanterol (BREO ELLIPTA) 200-25 MCG/INH inhaler 1 puff  1 puff     folic acid (FOLVITE) tablet 1 mg  1 mg     ipratropium - albuterol 0.5 mg/2.5 mg/3 mL (DUONEB) neb solution 3 mL  3 mL     lidocaine (LMX4) cream       lidocaine 1 % 0.1-1 mL  0.1-1 mL     magnesium hydroxide (MILK OF MAGNESIA) suspension 30 mL  30 mL     melatonin tablet 1 mg  1 mg     morphine (PF) injection 1 mg  1 mg     naloxone (NARCAN) injection 0.2 mg  0.2 mg        naloxone (NARCAN) injection 0.4 mg  0.4 mg        naloxone (NARCAN) injection 0.2 mg  0.2 mg        naloxone (NARCAN) injection 0.4 mg  0.4 mg     nicotine (NICORETTE) gum 4 mg  4 mg     nitroGLYcerin  (NITROSTAT) sublingual tablet 0.4 mg  0.4 mg     ondansetron (ZOFRAN-ODT) ODT tab 4 mg  4 mg        ondansetron (ZOFRAN) injection 4 mg  4 mg     oxyCODONE (ROXICODONE) tablet 5-10 mg  5-10 mg     predniSONE (DELTASONE) tablet 40 mg  40 mg     senna-docusate (SENOKOT-S/PERICOLACE) 8.6-50 MG per tablet 1 tablet  1 tablet        senna-docusate (SENOKOT-S/PERICOLACE) 8.6-50 MG per tablet 2 tablet  2 tablet     sodium chloride (PF) 0.9% PF flush 3 mL  3 mL     sodium chloride (PF) 0.9% PF flush 3 mL  3 mL     sodium chloride 0.9% infusion       umeclidinium (INCRUSE ELLIPTA) 62.5 MCG/INH inhaler 1 puff  1 puff     vancomycin 1500 mg in 0.9% NaCl 250 ml intermittent infusion 1,500 mg  1,500 mg     No current Epic-ordered outpatient medications on file.             Review of Systems:   The 10 point Review of Systems is negative other than noted in the HPI          Physical Exam:   Vitals were reviewed    General: Sitting up in ICU bed with face mask O2 in place, visibly uncomfortable and dyspneic  Neuro/Psych: A&O x to self, date, location; unable to understand situation; pleasantly conversant   HENT: EOMI and conjugate. Moist mucous membranes.   Cardiac: Regular rate and rhythm, no m/g appreciated.   Pulm: Lungs with diminished breath sounds anteriorly. Tripod breathing, with facemask.   Abd: Softly distended, no tenderness to palpation. No abdominal scars. No prominent aortic pulsation.  Extrem: Grossly normal and symmetric ROM in all four extremities. No edema.  Skin: Clean, dry, no rashes or wounds.  Vasc: Palpable BL DP pulses.          Data:          Lab Results   Component Value Date     01/28/2021    Lab Results   Component Value Date    CHLORIDE 105 01/28/2021    Lab Results   Component Value Date    BUN 19 01/28/2021      Lab Results   Component Value Date    POTASSIUM 4.4 01/28/2021    Lab Results   Component Value Date    CO2 28 01/28/2021    Lab Results   Component Value Date    CR 0.54 01/28/2021     "    Lab Results   Component Value Date    WBC 22.3 (H) 01/28/2021    HGB 11.1 (L) 01/28/2021    HCT 36.1 (L) 01/28/2021    MCV 92 01/28/2021     01/28/2021     Albumin (1/25/21): 2.6    Echocardiogram (6/24/20): \"Moderate valvular aortic stenosis. Hyperdynamic left ventricular function The visual ejection fraction is estimated at >70%. The left ventricle is normal in size. The ascending aorta is Mildly dilated.\"    I have reviewed the following imaging studies:   CT chest (1/25/21): \"1.  Left lower lobe atelectasis was present on the prior study and is not significantly changed. Large masslike airspace opacity in the lateral left upper lobe is new and likely infectious given its rapid development. The spiculated airspace opacity in the right middle lobe is also completely new compared to the prior study and more likely to represent infection. Hilar and mediastinal adenopathy is assumed to be reactive. The possibility of aggressive malignancy cannot be entirely excluded. Consider bronchoscopy. 2.  Infrarenal abdominal aortic aneurysm measures 5.8 cm compared to 3.7 cm on 8/2/2013. Vascular surgery referral recommended.\"    CTA chest/abd/pelvis (1/26/21): \"aneurysmal dilatation of the infrarenal abdominal aorta measuring up to 5.4 x 6.0 cm, previously 3.7 cm. The celiac access, superior mesenteric artery, bilateral renal arteries and inferior mesenteric artery are all patent. Bilateral common iliac, internal iliac and external iliac arteries are patent.\"           Assessment and Plan:   Mr. Tate is a 76 year old male with history of severe COPD with acute exacerbation and possible superimposed pneumonia vs pulmonary malignancy, dementia, and prolonged smoking history, who has been found to have a 6.0 cm infrarenal AAA.       I discussed the AAA with Mr. Tate. He seemed to understand the conversation and asked coherent questions, but I do not know how much he will remember from this due to his " dementia.     He does not frankly refuse surgical intervention, but stated multiple times that he wants to avoid surgeries and procedures as much as possible, and places value on not operating.     His anatomy is suitable for endovascular repair, but he is NOT a surgical or endovascular candidate. Even if he is able to clinically improve with more pulmonary stability, his profound COPD and pulmonary cachexia make him too high risk for permanent mechanical ventilation and/or death.     He is at roughly 10% risk of rupture annually based on size of the aneurysm, potentially slightly higher risk due to the COPD. I discussed with him that rupture would lead to death.     I would recommend no intervention on his AAA, due to his pulmonary risk.     Lacy Kapoor MD

## 2021-01-26 NOTE — PROGRESS NOTES
[facility]    Medicine Progress Note - Hospitalist Service       Date of Admission:  1/25/2021  Assessment & Plan       Jah Tate is a 76 year old male with end-stage COPD on 5 L of oxygen at home presents to the emergency department with worsening shortness of breath.  CT of the chest indicates cavitary lesion of the lungs.    Pneumonia of both lungs due to infectious organism, unspecified part of lung  Cavitary pneumonia  Masslike airspace opacity with concern for possible malignancy  End-stage COPD  Acute on chronic hypoxic respiratory failure    Patient is intermittently on 5 L of oxygen at home chronically (States it was prescribed several years ago, but he just started using it about one month ago), he continues to smoke half pack per day, currently presenting with 1 week history of shortness of breath, patient had received the Covid vaccine in the last week.    CT of the chest shows left lower lobe atelectatic cyst and a larger masslike airspace opacity in the lateral left upper lobe, which is new, there is also a spiculated airspace opacity in the right middle lobe.  There is associated hilar and mediastinal adenopathy.    Patient has history of chronic cough, currently he has a cavitary lesion in the left side of the lungs, the differential diagnosis includes cavitary pneumonia versus malignancy, tuberculosis also a differential.    Admitted to inpatient in the ICU.    Initially was placed on BiPAP in the ER, subsequently weaned down to 5 lpm of supplemental oxygen by nasal cannula.    QuantiFERON gold ordered.    Infectious disease consulted, appreciate their assistance.    Hold off on pulmonology consult today. Patient improving, may be able to transfer out of ICU soon. Consider consulting pulmonology after patient transfers out of ICU pending clinical course and ID recommendations. Patient may need bronchoscopy and biopsy at some point, but does not require them  emergently today.    Continue cefepime and vancomycin, patient is allergic to penicillin.    Influenza and COVID-19 PCR negative on 1/25/21.    Continue PTA Breo Ellipta and Incruse Ellipta. PRN albuterol available.    Increase PTA prednisone to 40 mg daily for now.    Abdominal aortic aneurysm    CT study on 1/25/21 showed an abdominal aortic aneurysm measuring 5.8 cm compared to 3.57 cm from 8/2/2013.    Vascular surgery consulted, appreciate their assistance.    Chronic gout of multiple sites    Currently asymptomatic.    Moderate Dementia     Answers questions appropriately this morning.    Bleeding from scrotum    Pinpoint area of bleeding on left scrotum.    Nursing to apply pressure, will let me know if this is not enough to control the bleeding.     Diet: Combination Diet Regular Diet Adult    DVT Prophylaxis: Pneumatic Compression Devices  Cope Catheter: not present  Code Status: Full Code           Disposition Plan   Expected discharge: Continue inpatient care for now. Possible transfer out of the ICU soon pending consultant recommendations.  Entered: Varun Edwards MD 01/26/2021, 9:36 AM       The patient's care was discussed with the Bedside Nurse and Patient.    Varun Edwards MD  Hospitalist Service  [facility]  Contact information available via [facility] Paging/Directory    ______________________________________________________________________    Interval History   Jah Tate continues to feel short of breath. Nebulizers help for a little while. No significant change in respiratory complaints compared to yesterday. Denies fevers, chest pain, nausea, abdominal pain. Developed bleeding from his scrotum this morning, had a large puddle of blood on the bed, unclear how it started.    Data reviewed today: I reviewed all medications, new labs and imaging results over the last 24 hours. I personally reviewed no images or EKG's today.    Physical Exam   Vital Signs:  Temp: 97.7  F (36.5  C) Temp src: Oral BP: 113/57 Pulse: 70   Resp: 17 SpO2: 98 % O2 Device: Nasal cannula Oxygen Delivery: 5 LPM  Weight: 160 lbs 14.97 oz  Constitutional: awake, alert, cooperative, appears mildly short of breath  Respiratory: appears mildly short of breath, diminished at the bases, scattered wheezes  Cardiovascular: regular rate and rhythm, normal S1 and S2, no murmur noted  GI: normal bowel sounds, soft, non-distended, non-tender  : small pinpoint source of bleeding on left side of scrotum  Skin: warm, dry  Musculoskeletal: no lower extremity pitting edema present  Neurologic: awake, alert, answers questions appropriately, motor is 5 out of 5 bilaterally, sensory is intact    Data   Recent Labs   Lab 01/26/21  0442 01/25/21  1519 01/25/21  0948   WBC 12.9*  --  23.1*   HGB 10.6*  --  12.5*   MCV 89  --  88     --  162   INR  --   --  1.24*     --  128*   POTASSIUM 4.8 4.6 4.4   CHLORIDE 100  --  90*   CO2 30  --  31   BUN 21  --  17   CR 0.64* 0.64* 0.66   ANIONGAP 4  --  7   SHEEBA 8.6  --  9.1   *  --  148*   ALBUMIN  --   --  2.6*   PROTTOTAL  --   --  7.4   BILITOTAL  --   --  0.8   ALKPHOS  --   --  83   ALT  --   --  16   AST  --   --  17   TROPI  --   --  0.024     Recent Results (from the past 24 hour(s))   XR Chest Port 1 View    Narrative    CHEST ONE VIEW PORTABLE   1/25/2021 10:18 AM     HISTORY: Respiratory distress.    COMPARISON: 6/28/2020      Impression    IMPRESSION: There is masslike abnormal airspace opacity in the mid  left lung that is new compared to the prior study and associated with  a small left pleural effusion. There is also a trace right pleural  effusion. Findings are suggestive of pneumonia with parapneumonic  effusion. Continued radiographic follow-up until complete resolution  is recommended.    ORLANDO KRUSE MD   CT Chest Pulmonary Embolism w Contrast    Narrative    CT CHEST PULMONARY EMBOLISM WITH CONTRAST 1/25/2021 11:36 AM    CLINICAL  HISTORY: Pulmonary embolus suspected, low/intermediate  probability, positive D-dimer; mass-like lesion and fluid on chest  x-ray.    TECHNIQUE: CT angiogram chest during arterial phase injection IV  contrast. 2D and 3D MIP reconstructions were performed by the CT  technologist. Dose reduction techniques were used.     CONTRAST: 66mL Isovue-370    COMPARISON: 6/26/2020, 4/6/2020, 8/2/2013    FINDINGS:  ANGIOGRAM CHEST: Pulmonary arteries are normal caliber and negative  for pulmonary emboli. Thoracic aorta is normal in caliber and negative  for dissection.    LUNGS AND PLEURA: Lungs are emphysematous. There is complete  atelectasis of the left lower lobe some small air bronchograms. There  is masslike airspace opacity in the pleural-based left upper lobe  without air bronchograms that measures 5.9 x 7.6 cm. There is a  spiculated airspace opacity in the central right middle lobe that  measures 2.3 x 1.2 cm (series 8, image 227). There is also mucous  plugging and volume loss in the right lower lobe. A small to  moderate-sized left pleural effusion is present.    MEDIASTINUM/AXILLAE: There is mediastinal and hilar adenopathy. A  dominant right hilar lymph node measures 2.5 x 1.8 cm (series 6, image  123). An enlarged AP window lymph node measures 1.8 x 1.9 cm (series  6, image 61). No axillary adenopathy. Thyroid gland is unremarkable.    UPPER ABDOMEN: Incompletely imaged infrarenal abdominal aortic  aneurysm measures up to 5.8 cm (series 6, image 216).    MUSCULOSKELETAL: No destructive bone lesions.      Impression    IMPRESSION:  1.  Left lower lobe atelectasis was present on the prior study and is  not significantly changed. Large masslike airspace opacity in the  lateral left upper lobe is new and likely infectious given its rapid  development. The spiculated airspace opacity in the right middle lobe  is also completely new compared to the prior study and more likely to  represent infection. Hilar and  mediastinal adenopathy is assumed to be  reactive. The possibility of aggressive malignancy cannot be entirely  excluded. Consider bronchoscopy.  2.  Infrarenal abdominal aortic aneurysm measures 5.8 cm compared to  3.7 cm on 8/2/2013. Vascular surgery referral recommended.    ORLANDO KRUSE MD     Medications     sodium chloride 100 mL/hr at 01/26/21 0500       ceFEPIme (MAXIPIME) IV  2 g Intravenous Q8H     fluticasone-vilanterol  1 puff Inhalation Daily     folic acid  1 mg Oral Daily     predniSONE  5 mg Oral Daily     umeclidinium  1 puff Inhalation Daily     vancomycin (VANCOCIN) IV  1,500 mg Intravenous Q12H

## 2021-01-26 NOTE — PLAN OF CARE
PT pt approached for PT eval, not agreeable to participate, states he sat on EOB earlier and it set him off coughing until he was worn out.  Despite education as to benefits and need to mobilize pt cont to refuse.  RN aware

## 2021-01-26 NOTE — CONSULTS
[facility]    Infectious Disease Consultation     Date of Admission:  1/25/2021  Date of Consult (When I saw the patient): 01/26/21    Assessment & Plan   Jah Tate is a 76 year old male who was admitted on 1/25/2021.     Impression:  1. 76 y.o male with end stage COPD, on 5 L of oxygen at home.   2. Current every day smoker.   3. Presenting with 1 week history of shortness of breath, patient had received Covid vaccine in the last week.  4. CT of the chest shows left lower lobe atelectatic cyst and a larger masslike airspace opacity in the lateral left upper lobe, which is new, there is also a spiculated airspace opacity in the right middle lobe.  There is associated hilar and mediastinal adenopathy.  5. Absolutely no risk factors for TB, never travelled out of [place] per patient, no dwelling in any shelter, jailhouse or FPC.   7. PCN listed as allergy.   8. Currently on cefepime and vancomycin.     Recommendations:  A lot of thick sputum on cough, collect sputum for cultures.   Low suspicion for TB if qunat negative remove from airborne isolation  covid negative, remove special precautions.   Continue on current antibiotics, add flagyl     Anna Estrada MD    Reason for Consult   Reason for consult: I was asked to evaluate this patient for cavitary lung lesion.    Primary Care Physician   Eric Johnson    Chief Complaint   Shortness of breath     History is obtained from the patient and medical records    History of Present Illness   Jah Tate is a 76 year old male  with end-stage COPD on 5 L of oxygen at home presents to the emergency department with worsening shortness of breath.  CT of the chest indicates cavitary lesion of the lungs    Past Medical History   I have reviewed this patient's medical history and updated it with pertinent information if needed.   Past Medical History:   Diagnosis Date     Bilateral lower extremity edema      COPD (chronic obstructive  pulmonary disease) (H)      Dementia (H)      Gout        Past Surgical History   I have reviewed this patient's surgical history and updated it with pertinent information if needed.  Past Surgical History:   Procedure Laterality Date     FRACTURE TX, ANKLE RT/LT      left- w/plate       Prior to Admission Medications   Prior to Admission Medications   Prescriptions Last Dose Informant Patient Reported? Taking?   INCRUSE ELLIPTA 62.5 MCG/INH Inhaler 1/24/2021 at Unknown time [facility] No Yes   Sig: TAKE 1 PUFF BY MOUTH EVERY DAY   albuterol (PROAIR HFA/PROVENTIL HFA/VENTOLIN HFA) 108 (90 Base) MCG/ACT inhaler  Nursing Home No No   Sig: INHALE 2 PUFFS BY MOUTH EVERY 6 HOURS AS NEEDED FOR SHORTNESS OF BREATH OR WHEEZING.   aspirin 81 MG tablet 1/24/2021 at Unknown time Nursing Home Yes Yes   Sig: Take 1 tablet by mouth daily   docusate sodium (DULCOLAX STOOL SOFTENER) 100 MG capsule 1/24/2021 at Unknown time Nursing Home Yes Yes   Sig: Take 2 capsules by mouth daily    fluticasone-vilanterol (BREO ELLIPTA) 200-25 MCG/INH inhaler 1/24/2021 at Unknown time [facility] No Yes   Sig: Inhale 1 puff into the lungs daily   folic acid (FOLVITE) 1 MG tablet 1/24/2021 at Unknown time [facility] No Yes   Sig: Take 1 tablet (1 mg) by mouth daily   ipratropium - albuterol 0.5 mg/2.5 mg/3 mL (DUONEB) 0.5-2.5 (3) MG/3ML neb solution  [facility] Yes Yes   Sig: Take 1 vial by nebulization every 6 hours as needed    predniSONE (DELTASONE) 5 MG tablet 1/25/2021 at Unknown time Nursing Home Yes Yes   Sig: Take 5 mg by mouth daily   vitamin D3 (CHOLECALCIFEROL) 2000 units (50 mcg) tablet 1/24/2021 at Unknown time Nursing Home Yes Yes   Sig: Take 1 tablet by mouth daily      Facility-Administered Medications: None     Allergies   Allergies   Allergen Reactions     Penicillins Swelling     Has tolerated Keflex, cefuroxime     Sulfa Drugs Swelling     Chantix [Varenicline Tartrate]      Hallucinations         Immunization History    Immunization History   Administered Date(s) Administered     COVID-19,PF,Moderna 01/20/2021     Influenza (High Dose) 3 valent vaccine 09/30/2014, 12/21/2015, 10/26/2016, 10/13/2017     Influenza (IIV3) PF 10/18/2011, 11/09/2012, 10/25/2013     Pneumo Conj 13-V (2010&after) 04/16/2015     Pneumococcal 23 valent 07/17/2012     TDAP Vaccine (Adacel) 06/11/2016       Social History   I have reviewed this patient's social history and updated it with pertinent information if needed. Jah Tate  reports that he has been smoking cigarettes. He has a 30.00 pack-year smoking history. He has never used smokeless tobacco. He reports that he does not drink alcohol or use drugs.    Family History   I have reviewed this patient's family history and updated it with pertinent information if needed.   No family history on file.    Review of Systems   The 10 point Review of Systems is negative other than noted in the HPI or here.     Physical Exam   Temp: 97.7  F (36.5  C) Temp src: Oral BP: 113/57 Pulse: 70   Resp: 17 SpO2: 98 % O2 Device: Nasal cannula Oxygen Delivery: 5 LPM  Vital Signs with Ranges  Temp:  [97.7  F (36.5  C)-98.4  F (36.9  C)] 97.7  F (36.5  C)  Pulse:  [] 70  Resp:  [16-28] 17  BP: ()/(50-99) 113/57  FiO2 (%):  [35 %-50 %] 35 %  SpO2:  [85 %-98 %] 98 %  160 lbs 14.97 oz  Body mass index is 22.45 kg/m .    GENERAL APPEARANCE: on oxygen through nasal cannula  EYES: Eyes grossly normal to inspection, PERRL and conjunctivae and sclerae normal  HENT: ear canals and TM's normal and nose and mouth without ulcers or lesions  NECK: no adenopathy, no asymmetry, masses, or scars and thyroid normal to palpation  RESP: diminished breath sounds   CV: regular rates and rhythm, normal S1 S2, no S3 or S4 and no murmur, click or rub  LYMPHATICS: normal ant/post cervical and supraclavicular nodes  ABDOMEN: soft, nontender, without hepatosplenomegaly or masses and bowel sounds normal  MS: extremities normal-  no gross deformities noted  SKIN: no suspicious lesions or rashes      Data   Lab Results   Component Value Date    WBC 12.9 (H) 01/26/2021    HGB 10.6 (L) 01/26/2021    HCT 33.3 (L) 01/26/2021     01/26/2021     01/26/2021    POTASSIUM 4.8 01/26/2021    CHLORIDE 100 01/26/2021    CO2 30 01/26/2021    BUN 21 01/26/2021    CR 0.64 (L) 01/26/2021     (H) 01/26/2021    DD 2.9 (H) 01/25/2021    NTBNPI 820 01/25/2021    TROPI 0.024 01/25/2021    AST 17 01/25/2021    ALT 16 01/25/2021    ALKPHOS 83 01/25/2021    BILITOTAL 0.8 01/25/2021    INR 1.24 (H) 01/25/2021     Recent Labs   Lab 01/25/21  1016   CULT No growth after 18 hours     Recent Labs   Lab Test 01/25/21  1016 06/25/20  2000 04/06/20  1748   CULT No growth after 18 hours Heavy growth  Normal oma   No growth  No growth       Amount of time performed on this consult: 45 minutes. This includes face to face assessment and care coordination with the primary team.

## 2021-01-26 NOTE — PLAN OF CARE
Pt was transferred from CCU, freiquently on call light. Pt repo ports SOB ( at the same time he is sating at 95-96%) albuterol given x 1.  CNS:  Oriented x 3 , disoriented bysituation , interactive.   Pulm: ls coarse crackles. Intermittent nonproductive cough. diminish on the left side, scattered wheezes heard bilaterally, good sat's on 4 L O 2.  CV:  SR, HR 80 - 90's,  Afebrile  GI/: Regular diet, voiding adequately using urinal. Last BM 1/24  Skin: wdl  Access:  L arm x 2 PIV, NS @ 100 ml/h, and IV Abx.

## 2021-01-26 NOTE — PLAN OF CARE
"OT: attempted to see patient for initial OT evaluation, patient refused despite encouragement and education, \"I just want to stay in bed all day\" and later \"I'll think about it\".   "

## 2021-01-27 LAB
ANION GAP SERPL CALCULATED.3IONS-SCNC: 3 MMOL/L (ref 3–14)
BUN SERPL-MCNC: 24 MG/DL (ref 7–30)
CALCIUM SERPL-MCNC: 8.2 MG/DL (ref 8.5–10.1)
CHLORIDE SERPL-SCNC: 108 MMOL/L (ref 94–109)
CO2 SERPL-SCNC: 29 MMOL/L (ref 20–32)
CREAT SERPL-MCNC: 0.52 MG/DL (ref 0.66–1.25)
ERYTHROCYTE [DISTWIDTH] IN BLOOD BY AUTOMATED COUNT: 15.4 % (ref 10–15)
GFR SERPL CREATININE-BSD FRML MDRD: >90 ML/MIN/{1.73_M2}
GLUCOSE SERPL-MCNC: 114 MG/DL (ref 70–99)
GRAM STN SPEC: NORMAL
HCT VFR BLD AUTO: 32.4 % (ref 40–53)
HGB BLD-MCNC: 10 G/DL (ref 13.3–17.7)
Lab: NORMAL
MCH RBC QN AUTO: 28.2 PG (ref 26.5–33)
MCHC RBC AUTO-ENTMCNC: 30.9 G/DL (ref 31.5–36.5)
MCV RBC AUTO: 91 FL (ref 78–100)
MRSA DNA SPEC QL NAA+PROBE: NEGATIVE
PLATELET # BLD AUTO: 188 10E9/L (ref 150–450)
POTASSIUM SERPL-SCNC: 4.4 MMOL/L (ref 3.4–5.3)
RBC # BLD AUTO: 3.55 10E12/L (ref 4.4–5.9)
SODIUM SERPL-SCNC: 140 MMOL/L (ref 133–144)
SPECIMEN SOURCE: NORMAL
SPECIMEN SOURCE: NORMAL
VANCOMYCIN SERPL-MCNC: 20.8 MG/L
WBC # BLD AUTO: 12.9 10E9/L (ref 4–11)

## 2021-01-27 PROCEDURE — 258N000003 HC RX IP 258 OP 636

## 2021-01-27 PROCEDURE — 250N000013 HC RX MED GY IP 250 OP 250 PS 637: Performed by: HOSPITALIST

## 2021-01-27 PROCEDURE — 258N000003 HC RX IP 258 OP 636: Performed by: INTERNAL MEDICINE

## 2021-01-27 PROCEDURE — 36415 COLL VENOUS BLD VENIPUNCTURE: CPT | Performed by: HOSPITALIST

## 2021-01-27 PROCEDURE — 250N000012 HC RX MED GY IP 250 OP 636 PS 637: Performed by: HOSPITALIST

## 2021-01-27 PROCEDURE — 120N000001 HC R&B MED SURG/OB

## 2021-01-27 PROCEDURE — 80202 ASSAY OF VANCOMYCIN: CPT | Performed by: INTERNAL MEDICINE

## 2021-01-27 PROCEDURE — 250N000011 HC RX IP 250 OP 636: Performed by: INTERNAL MEDICINE

## 2021-01-27 PROCEDURE — 80048 BASIC METABOLIC PNL TOTAL CA: CPT | Performed by: HOSPITALIST

## 2021-01-27 PROCEDURE — 250N000011 HC RX IP 250 OP 636: Performed by: HOSPITALIST

## 2021-01-27 PROCEDURE — 250N000013 HC RX MED GY IP 250 OP 250 PS 637: Performed by: INTERNAL MEDICINE

## 2021-01-27 PROCEDURE — 87641 MR-STAPH DNA AMP PROBE: CPT | Performed by: INTERNAL MEDICINE

## 2021-01-27 PROCEDURE — 36415 COLL VENOUS BLD VENIPUNCTURE: CPT | Performed by: INTERNAL MEDICINE

## 2021-01-27 PROCEDURE — 85027 COMPLETE CBC AUTOMATED: CPT | Performed by: HOSPITALIST

## 2021-01-27 PROCEDURE — 99233 SBSQ HOSP IP/OBS HIGH 50: CPT | Performed by: HOSPITALIST

## 2021-01-27 PROCEDURE — 250N000011 HC RX IP 250 OP 636

## 2021-01-27 PROCEDURE — 87640 STAPH A DNA AMP PROBE: CPT | Performed by: INTERNAL MEDICINE

## 2021-01-27 RX ORDER — IPRATROPIUM BROMIDE AND ALBUTEROL SULFATE 2.5; .5 MG/3ML; MG/3ML
3 SOLUTION RESPIRATORY (INHALATION) EVERY 4 HOURS PRN
Status: DISCONTINUED | OUTPATIENT
Start: 2021-01-27 | End: 2021-02-05 | Stop reason: HOSPADM

## 2021-01-27 RX ADMIN — CEFEPIME HYDROCHLORIDE 2 G: 2 INJECTION, POWDER, FOR SOLUTION INTRAVENOUS at 03:32

## 2021-01-27 RX ADMIN — PREDNISONE 40 MG: 20 TABLET ORAL at 08:33

## 2021-01-27 RX ADMIN — MORPHINE SULFATE 1 MG: 2 INJECTION, SOLUTION INTRAMUSCULAR; INTRAVENOUS at 14:52

## 2021-01-27 RX ADMIN — SODIUM CHLORIDE: 9 INJECTION, SOLUTION INTRAVENOUS at 00:35

## 2021-01-27 RX ADMIN — MORPHINE SULFATE 1 MG: 2 INJECTION, SOLUTION INTRAMUSCULAR; INTRAVENOUS at 08:50

## 2021-01-27 RX ADMIN — VANCOMYCIN HYDROCHLORIDE 1500 MG: 5 INJECTION, POWDER, LYOPHILIZED, FOR SOLUTION INTRAVENOUS at 11:35

## 2021-01-27 RX ADMIN — METRONIDAZOLE 500 MG: 500 INJECTION, SOLUTION INTRAVENOUS at 14:52

## 2021-01-27 RX ADMIN — SODIUM CHLORIDE: 9 INJECTION, SOLUTION INTRAVENOUS at 10:52

## 2021-01-27 RX ADMIN — FOLIC ACID 1 MG: 1 TABLET ORAL at 08:34

## 2021-01-27 RX ADMIN — METRONIDAZOLE 500 MG: 500 INJECTION, SOLUTION INTRAVENOUS at 00:50

## 2021-01-27 RX ADMIN — CEFEPIME HYDROCHLORIDE 2 G: 2 INJECTION, POWDER, FOR SOLUTION INTRAVENOUS at 18:46

## 2021-01-27 RX ADMIN — UMECLIDINIUM 1 PUFF: 62.5 AEROSOL, POWDER ORAL at 07:46

## 2021-01-27 RX ADMIN — CEFEPIME HYDROCHLORIDE 2 G: 2 INJECTION, POWDER, FOR SOLUTION INTRAVENOUS at 10:56

## 2021-01-27 RX ADMIN — FLUTICASONE FUROATE AND VILANTEROL TRIFENATATE 1 PUFF: 200; 25 POWDER RESPIRATORY (INHALATION) at 08:35

## 2021-01-27 RX ADMIN — QUETIAPINE 25 MG: 25 TABLET ORAL at 08:34

## 2021-01-27 ASSESSMENT — ACTIVITIES OF DAILY LIVING (ADL)
ADLS_ACUITY_SCORE: 18
ADLS_ACUITY_SCORE: 18
ADLS_ACUITY_SCORE: 16
ADLS_ACUITY_SCORE: 18
ADLS_ACUITY_SCORE: 16
ADLS_ACUITY_SCORE: 16

## 2021-01-27 NOTE — PHARMACY-VANCOMYCIN DOSING SERVICE
Pharmacy Vancomycin Note  Date of Service 2021  Patient's  1945   76 year old, male    Indication: Community Acquired Pneumonia  Goal Trough Level: 15-20 mg/L  Day of Therapy: 3  Current Vancomycin regimen:  1500 mg IV q12h    Current estimated CrCl = Estimated Creatinine Clearance: 125 mL/min (A) (based on SCr of 0.52 mg/dL (L)).    Creatinine for last 3 days  2021:  9:48 AM Creatinine 0.66 mg/dL;  3:19 PM Creatinine 0.64 mg/dL  2021:  4:42 AM Creatinine 0.64 mg/dL  2021:  6:13 AM Creatinine 0.52 mg/dL    Recent Vancomycin Levels (past 3 days)  2021: 10:59 AM Vancomycin Level 20.8 mg/L    Vancomycin IV Administrations (past 72 hours)                   vancomycin 1500 mg in 0.9% NaCl 250 ml intermittent infusion 1,500 mg (mg) 1,500 mg New Bag 21 1135     1,500 mg New Bag 21 2310     1,500 mg New Bag  1203     1,500 mg New Bag 21 2352    vancomycin (VANCOCIN) 1,750 mg in sodium chloride 0.9 % 500 mL intermittent infusion (mg) 1,750 mg Given 21 1139                Nephrotoxins and other renal medications (From now, onward)    Start     Dose/Rate Route Frequency Ordered Stop    21 2330  vancomycin 1250 mg in 0.9% NaCl 250 mL intermittent infusion 1,250 mg      1,250 mg  over 90 Minutes Intravenous EVERY 12 HOURS 21 1315               Contrast Orders - past 72 hours (72h ago, onward)    Start     Dose/Rate Route Frequency Ordered Stop    21 1230  iopamidol (ISOVUE-370) solution 80 mL      80 mL Intravenous ONCE 21 1200 21 1308    21 1115  iopamidol (ISOVUE-370) solution 66 mL      66 mL Intravenous ONCE 21 1114 21 1135          Interpretation of levels and current regimen:  Trough level is slightly Supratherapeutic    Has serum creatinine changed > 50% in last 72 hours: No    Urine output:  good urine output    Renal Function: Stable    Plan:  1.  Decrease Dose to Vancomycin 1250 mg IV q12h.    2.  Pharmacy  will check trough levels as appropriate in 3-5 Days.    3. Serum creatinine levels will be ordered daily for the first week of therapy and at least twice weekly for subsequent weeks.      Joyce Gunter, PharmD, BCPS        .

## 2021-01-27 NOTE — PLAN OF CARE
Pt resting for most of shift on 5L nc with sats >90%. Pt refuses cares. Isolation removed this am. Will continue to monitor.

## 2021-01-27 NOTE — PLAN OF CARE
Pt is argumentative, restless. Pt had bleeding in perineal/left scrotal area- MD notified and assessed. Per vascular surgery pt needs CT- aortic aneurism found as well as LLL atelectasis, will need a repeat CT. Pt puts on the call light continuously, prn med given, emotional support provided. Will continue monitoring.

## 2021-01-27 NOTE — PROGRESS NOTES
Cross cover    covid and tb isolation removed.     Noted quantiferon and covid 19 negative.      Ross Allen MD

## 2021-01-27 NOTE — PROGRESS NOTES
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  CT of the chest indicates cavitary lesion of the lungs.    Pneumonia of both lungs due to infectious organism, unspecified part of lung  Cavitary pneumonia  Masslike airspace opacity with concern for possible malignancy  End-stage COPD  Acute on chronic hypoxic respiratory failure    Patient is intermittently on 5 L of oxygen at home chronically (States it was prescribed several years ago, but he just started using it about one month ago), he continues to smoke half pack per day, currently presenting with 1 week history of shortness of breath, patient had received the Covid vaccine in the last week.    CT of the chest shows left lower lobe atelectatic cyst and a larger masslike airspace opacity in the lateral left upper lobe, which is new, there is also a spiculated airspace opacity in the right middle lobe.  There is associated hilar and mediastinal adenopathy.    Patient has history of chronic cough, currently he has a cavitary lesion in the left side of the lungs, the differential diagnosis includes cavitary pneumonia versus malignancy, tuberculosis also a differential.    Admitted to inpatient in the ICU initially.    Initially was placed on BiPAP in the ER, subsequently weaned down to 5 lpm of supplemental oxygen by nasal cannula.    QuantiFERON gold negative.    Infectious disease consulted, appreciate their assistance.    Will consult pulmonology after he is transferred out of the ICU. Neither of the current ICU attendings have pulmonology training, so waited for transfer out of the ICU to get pulmonology involved.    Continue cefepime and vancomycin, patient is allergic to penicillin. Defer antibiotics to  Infectious Disease.    Influenza and COVID-19 PCR negative on 1/25/21.    Continue PTA Breo Ellipta and Incruse Ellipta. PRN albuterol available.    Continue PTA prednisone to 40 mg daily for now.    Abdominal aortic aneurysm    CT study on 1/25/21 showed an abdominal aortic aneurysm measuring 5.8 cm compared to 3.57 cm from 8/2/2013.    Vascular surgery consulted, appreciate their assistance.    Likely planning outpatient follow-up once he recovers from his pulmonary issues.    Chronic gout of multiple sites    Currently asymptomatic.    Moderate Dementia     Answers orientation questions appropriately, but seems forgetful at times.    Daughter confirms diagnosis of dementia and states he is forgetful at baseline now.    Bleeding from scrotum    Pinpoint area of bleeding on left scrotum on 1/26/21.    Resolved with application of pressure by nursing.     Diet: Combination Diet Regular Diet Adult    DVT Prophylaxis: Pneumatic Compression Devices  Cope Catheter: not present  Code Status: Full Code           Disposition Plan   Expected discharge: transfer out of ICU today, continue inpatient care  Entered: Varun Edwards MD 01/27/2021, 1:07 PM       The patient's care was discussed with the Bedside Nurse, Patient and Patient's Family.    Varun Edwards MD  Hospitalist Service  [facility]  Contact information available via [facility] Paging/Directory    ______________________________________________________________________    Interval History   Jah GILL Dagobertomilton feels about the same today. Continues to complain of shortness of breath, no significant change from yesterday. Has a cough productive of white-yellow phlegm. Denies fevers, chest pain, nausea, abdominal pain. Oriented, but seems forgetful. Updated his daughter, Padmini, by phone today.    Data reviewed today: I reviewed all medications, new labs and imaging results over the last 24 hours. I personally reviewed no images or EKG's  today.    Physical Exam   Vital Signs: Temp: 97.9  F (36.6  C) Temp src: Axillary BP: 123/76 Pulse: 63   Resp: 19 SpO2: 94 % O2 Device: Oxymask Oxygen Delivery: 6 LPM  Weight: 166 lbs 7.16 oz  Constitutional: awake, alert, cooperative, no apparent distress  Respiratory: diminished at the bases, few scattered wheezes  Cardiovascular: regular rate and rhythm, normal S1 and S2, no murmur noted  GI: normal bowel sounds, soft, non-distended, non-tender  Skin: warm, dry  Musculoskeletal: no lower extremity pitting edema present  Neurologic: awake, alert, oriented to name, state, month, year, current president; motor is 5 out of 5 bilaterally, sensory is intact    Data   Recent Labs   Lab 01/27/21  0613 01/26/21  0442 01/25/21  1519 01/25/21  0948   WBC 12.9* 12.9*  --  23.1*   HGB 10.0* 10.6*  --  12.5*   MCV 91 89  --  88    151  --  162   INR  --   --   --  1.24*    134  --  128*   POTASSIUM 4.4 4.8 4.6 4.4   CHLORIDE 108 100  --  90*   CO2 29 30  --  31   BUN 24 21  --  17   CR 0.52* 0.64* 0.64* 0.66   ANIONGAP 3 4  --  7   SHEEBA 8.2* 8.6  --  9.1   * 132*  --  148*   ALBUMIN  --   --   --  2.6*   PROTTOTAL  --   --   --  7.4   BILITOTAL  --   --   --  0.8   ALKPHOS  --   --   --  83   ALT  --   --   --  16   AST  --   --   --  17   TROPI  --   --   --  0.024     Medications     sodium chloride 100 mL/hr at 01/27/21 1052       ceFEPIme (MAXIPIME) IV  2 g Intravenous Q8H     fluticasone-vilanterol  1 puff Inhalation Daily     folic acid  1 mg Oral Daily     metroNIDAZOLE  500 mg Intravenous Q12H     predniSONE  40 mg Oral Daily     sodium chloride 0.9 %  100 mL Intravenous Once     umeclidinium  1 puff Inhalation Daily     vancomycin (VANCOCIN) IV  1,250 mg Intravenous Q12H

## 2021-01-27 NOTE — PLAN OF CARE
Called to pt bedside by sitter. Pt is short of breath and anxious. Pt was asleep when he awoke and had to use urinal. Bedside attendant assisted pt with urinal. Pt became agitated and  Removed all leads, gown and blood pressure cuff. Pt sats decreased to 77%. Pt placed on 10 oxy mask. sats returned to >90%, pt returned to 6L NC. Work of breathing returned to baseline. Pt refuses most cares and will not let RN assess back, or use SCD's. Pt states that he would like to see his daughter. RN informs patient that it is 0200 and daughter will be called at a more reasonable hour. Will continue to monitor closely.

## 2021-01-27 NOTE — PLAN OF CARE
PT/OT: Orders received, chart reviewed, attempted session. Pt politely declining participation stating he doesn't feel well and moving makes him feel worse. Education and encouragement provided, pt continues to decline participation. Will reschedule PT/OT at request of patient.

## 2021-01-27 NOTE — PROGRESS NOTES
[facility]    Infectious Disease Progress Note    Date of Service (when I saw the patient): 01/27/2021     Assessment & Plan   Jah Tate is a 76 year old male who was admitted on 1/25/2021.     Impression:  1. 76 y.o male with end stage COPD, on 5 L of oxygen at home.   2. Current every day smoker.   3. Presenting with 1 week history of shortness of breath, patient had received Covid vaccine in the last week.  4. CT of the chest shows left lower lobe atelectatic cyst and a larger masslike airspace opacity in the lateral left upper lobe, which is new, there is also a spiculated airspace opacity in the right middle lobe.  There is associated hilar and mediastinal adenopathy.  5. Absolutely no risk factors for TB, never travelled out of [place] per patient, no dwelling in any shelter, jailhouse or halfway. Quantiferon TB is negative, out of iso now.   7. PCN listed as allergy.   8. Currently on cefepime and vancomycin.      Recommendations:  Continue on cefepime and flagyl   Get a MRSA PCR if neg, discontinue IV vanco       Anna Estrada MD    Interval History   No fever   Quant tb is neg   Slightly more short of breath today     Physical Exam   Temp: 97.9  F (36.6  C) Temp src: Axillary BP: 132/74 Pulse: 66   Resp: 21 SpO2: 91 % O2 Device: Oxymask Oxygen Delivery: 6 LPM  Vitals:    01/25/21 0942 01/26/21 0353 01/27/21 0400   Weight: 78.9 kg (174 lb) 73 kg (160 lb 15 oz) 75.5 kg (166 lb 7.2 oz)     Vital Signs with Ranges  Temp:  [97.4  F (36.3  C)-98.1  F (36.7  C)] 97.9  F (36.6  C)  Pulse:  [] 66  Resp:  [13-27] 21  BP: ()/(49-87) 132/74  SpO2:  [89 %-100 %] 91 %    Constitutional: Awake,  Lungs: using accessory muscles, coarse   Cardiovascular: Regular rate and rhythm, normal S1 and S2, and no murmur noted  Abdomen: Normal bowel sounds, soft, non-distended, non-tender  Skin: No rashes, no cyanosis, no edema  Other:    Medications     sodium chloride 100 mL/hr at 01/27/21  1052       ceFEPIme (MAXIPIME) IV  2 g Intravenous Q8H     fluticasone-vilanterol  1 puff Inhalation Daily     folic acid  1 mg Oral Daily     metroNIDAZOLE  500 mg Intravenous Q12H     predniSONE  40 mg Oral Daily     sodium chloride 0.9 %  100 mL Intravenous Once     umeclidinium  1 puff Inhalation Daily     vancomycin (VANCOCIN) IV  1,500 mg Intravenous Q12H       Data   All microbiology laboratory data reviewed.  Recent Labs   Lab Test 01/27/21  0613 01/26/21  0442 01/25/21  0948   WBC 12.9* 12.9* 23.1*   HGB 10.0* 10.6* 12.5*   HCT 32.4* 33.3* 38.5*   MCV 91 89 88    151 162     Recent Labs   Lab Test 01/27/21  0613 01/26/21  0442 01/25/21  1519   CR 0.52* 0.64* 0.64*     No lab results found.  Recent Labs   Lab Test 01/26/21  1210 01/26/21  0442 01/25/21  1016 06/25/20 2000 04/06/20  1748   CULT Canceled, Test credited  >10 Squamous epithelial cells/low power field indicates oral contamination. Please   recollect.  *  Notification of test cancellation was given to  JUAN ALBERTO COON RN 01/26/21 [address] [place] [address].   No growth after 1 day No growth after 2 days Heavy growth  Normal oma   No growth  No growth       Attestation:  Total time on the floor involved in the patient's care: 35 minutes. Total time spent in counseling/care coordination: >50%

## 2021-01-27 NOTE — CONSULTS
[facility] PATIENT CONSULTATION [facility]  Consulted by: Jazmine Lewis MD  Reason for consultation: Evaluation of asymptomatic AAA    HPI:  Jah Tate is a 76 year old year old male who has a PMH significant for dementia, gout, COPD with acute on chronic hypoxic respiratory failure.  He is usually on 5 L of supplemental O2 at home.  He has a history of pulmonary function tests demonstrating FEV1 of 27%.  He was admitted for exacerbation of COPD.  CT was performed showing left pulmonary changes suggestive of cavitary lesion-pneumonia versus malignancy.  On the CT chest, he was also noted to have an aneurysm which had increased in size when compared to previous axial imaging.    He was seen at the bedside with in the ICU.  He was conversant but became dyspneic during the conversation.He reports that he does not normally walk much and his respiratory issues prevents much activity.  He is able to ambulate slowly around the house with walker.  He denies rest pain or claudication.  He denies any back chest or abdominal pain.  He is a current everyday smoker consuming approximately 1/2 pack/day.      Review Of Systems:   10 Point review of system is negative except for noted in HPI    PAST MEDICAL HISTORY:  Past Medical History:   Diagnosis Date     Bilateral lower extremity edema      COPD (chronic obstructive pulmonary disease) (H)      Dementia (H)      Gout        PAST SURGICAL HISTORY:  Past Surgical History:   Procedure Laterality Date     FRACTURE TX, ANKLE RT/LT      left- w/plate       FAMILY HISTORY:  No family history on file.    SOCIAL HISTORY:   Social History     Tobacco Use     Smoking status: Current Every Day Smoker     Packs/day: 0.50     Years: 60.00     Pack years: 30.00     Types: Cigarettes     Smokeless tobacco: Never Used   Substance Use Topics     Alcohol use: No     Comment: 4-5 drinks per month         HOME MEDICATIONS:  Prior to Admission medications    Medication Sig Start  Date End Date Taking? Authorizing Provider   aspirin 81 MG tablet Take 1 tablet by mouth daily   Yes Reported, Patient   docusate sodium (DULCOLAX STOOL SOFTENER) 100 MG capsule Take 2 capsules by mouth daily    Yes Reported, Patient   fluticasone-vilanterol (BREO ELLIPTA) 200-25 MCG/INH inhaler Inhale 1 puff into the lungs daily 8/11/20  Yes Eric Johnson MD   folic acid (FOLVITE) 1 MG tablet Take 1 tablet (1 mg) by mouth daily 9/1/20  Yes Elvira Hilliard APRN CNP   INCRUSE ELLIPTA 62.5 MCG/INH Inhaler TAKE 1 PUFF BY MOUTH EVERY DAY 12/15/20  Yes Elvira Hilliard APRN CNP   ipratropium - albuterol 0.5 mg/2.5 mg/3 mL (DUONEB) 0.5-2.5 (3) MG/3ML neb solution Take 1 vial by nebulization every 6 hours as needed    Yes Unknown, Entered By History   predniSONE (DELTASONE) 5 MG tablet Take 5 mg by mouth daily 9/17/20  Yes Reported, Patient   vitamin D3 (CHOLECALCIFEROL) 2000 units (50 mcg) tablet Take 1 tablet by mouth daily   Yes Reported, Patient   albuterol (PROAIR HFA/PROVENTIL HFA/VENTOLIN HFA) 108 (90 Base) MCG/ACT inhaler INHALE 2 PUFFS BY MOUTH EVERY 6 HOURS AS NEEDED FOR SHORTNESS OF BREATH OR WHEEZING. 8/31/20   Tim Sieegl MD       VITAL SIGNS:  /74   Pulse 66   Temp 97.9  F (36.6  C) (Axillary)   Resp 21   Wt 75.5 kg (166 lb 7.2 oz)   SpO2 91%   BMI 23.21 kg/m      Intake/Output Summary (Last 24 hours) at 1/27/2021 0906  Last data filed at 1/27/2021 0724  Gross per 24 hour   Intake 2300 ml   Output 1775 ml   Net 525 ml       Labs:  ROUTINE IP LABS (Last four results)  BMP  Recent Labs   Lab 01/27/21  0613 01/26/21  0442 01/25/21  1519 01/25/21  0948    134  --  128*   POTASSIUM 4.4 4.8 4.6 4.4   CHLORIDE 108 100  --  90*   SHEEBA 8.2* 8.6  --  9.1   CO2 29 30  --  31   BUN 24 21  --  17   CR 0.52* 0.64* 0.64* 0.66   * 132*  --  148*     CBC  Recent Labs   Lab 01/27/21  0613 01/26/21  0442 01/25/21  0948   WBC 12.9* 12.9* 23.1*   RBC 3.55* 3.73* 4.39*   HGB 10.0*  10.6* 12.5*   HCT 32.4* 33.3* 38.5*   MCV 91 89 88   MCH 28.2 28.4 28.5   MCHC 30.9* 31.8 32.5   RDW 15.4* 15.1* 15.0    151 162     INR  Recent Labs   Lab 01/25/21  0948   INR 1.24*         PHYSICAL EXAM:  Constitutional: Normal appearance, ill-appearing in mild respiratory distress and cooperative   Psychiatric: Alert and oriented x3, normal affect  HENT: Normocephalic, atraumatic  Cardiovascular: RRR, no Murmurs  Respiratory: Diminished breath sounds bilaterally, on facemask for oxygenation  Neck: Supple, symmetrical, no masses  GI/Abdomen: +BS, abdomen soft, non distended, No masses, no CVAT, no rebound  MSK: No edema, able to move all extremities without difficulty  Extremities: no cyanosis, trauma, extremities warm and well perfused   Neuro: CN 2-12 grossly intact, strength and sensory grossly intact   Vascular: Bilateral palpable radial, femoral, dorsalis pedis        IMAGING:  Recent Results (from the past 24 hour(s))   CTA Abdomen Pelvis with Contrast    Narrative    CTA ABDOMEN AND PELVIS WITH CONTRAST   1/26/2021 1:09 PM     HISTORY: Aneurysm, pelvis or lower extremity    TECHNIQUE: CTA abdomen and pelvis with 80mL ISOVUE-370 IV. Radiation  dose for this scan was reduced using automated exposure control,  adjustment of the mA and/or kV according to patient size, or iterative  reconstruction technique. 3-D images were created at an independent  workstation under concurrent supervision at the request of the  ordering provider.    COMPARISON: 8/2/2013    FINDINGS:   Lung bases: Moderate left pleural effusion with associated  atelectasis. Coronary artery calcifications are again noted.    Vasculature: Again noted, there is aneurysmal dilatation of the  infrarenal abdominal aorta measuring up to 5.4 x 6.0 cm, previously  3.7 cm. The celiac access, superior mesenteric artery, bilateral renal  arteries and inferior mesenteric artery are all patent. Bilateral  common iliac, internal iliac and external  iliac arteries are patent.    Abdomen: Evaluation of solid organ parenchyma is limited secondary to  field-of-view and contrast bolus timing. The visualized portions of  the spleen, kidneys, adrenal glands, liver, gallbladder and pancreas  show no focal abnormality. No intrahepatic or extrahepatic biliary  dilatation. No intraperitoneal free air or free fluid.    Small and large bowel are normal in caliber without evidence of  obstruction. Diverticulosis without evidence of diverticulitis. No  abdominal or pelvic lymphadenopathy. Bladder is normal.    Bones: No suspicious bony lesions.      Impression    IMPRESSION:  1. Aneurysmal dilatation of the abdominal aorta measuring up to 5.4 x  6.0 cm, previously 3.7 cm.  2. Moderate left pleural effusion with associated atelectasis.  3. Diverticulosis without evidence of diverticulitis.         Patient Active Problem List   Diagnosis     End-stage COPD on Home O2 5 LPM     Health Care Home     Influenza B     Chronic gout of multiple sites     Moderate Dementia      CAP (community acquired pneumonia)     Smoker     COPD exacerbation (H)     Pneumonia of both lungs due to infectious organism, unspecified part of lung     Cavitary pneumonia     Lung cancer (H) POSSIBLE       ASSESSMENT:  Jah Tate is a 76 year old year old male with end-stage COPD on 5 L of supplemental O2 at home admitted for COPD exacerbation.  Also with asymptomatic 5.8 cm fusiform infrarenal AAA.    Patient is a very poor surgical candidate and extremely high risk for perioperative morbidity and mortality from cardiopulmonary standpoint.  Although  anatomically he is a good candidate for endovascular repair, risk may outweigh benefits.  Given current treatment for possible pneumonia, would not recommend repair with endograft at this time.      Andrea Up MD  Vascular Surgery fellow  [facility]

## 2021-01-28 ENCOUNTER — APPOINTMENT (OUTPATIENT)
Dept: GENERAL RADIOLOGY | Facility: CLINIC | Age: 76
DRG: 193 | End: 2021-01-28
Attending: INTERNAL MEDICINE
Payer: COMMERCIAL

## 2021-01-28 ENCOUNTER — APPOINTMENT (OUTPATIENT)
Dept: ULTRASOUND IMAGING | Facility: CLINIC | Age: 76
DRG: 193 | End: 2021-01-28
Attending: HOSPITALIST
Payer: COMMERCIAL

## 2021-01-28 ENCOUNTER — APPOINTMENT (OUTPATIENT)
Dept: ULTRASOUND IMAGING | Facility: CLINIC | Age: 76
DRG: 193 | End: 2021-01-28
Attending: NURSE PRACTITIONER
Payer: COMMERCIAL

## 2021-01-28 LAB
ANION GAP SERPL CALCULATED.3IONS-SCNC: 1 MMOL/L (ref 3–14)
ANION GAP SERPL CALCULATED.3IONS-SCNC: 6 MMOL/L (ref 3–14)
APPEARANCE FLD: NORMAL
BASE EXCESS BLDA CALC-SCNC: 2.8 MMOL/L
BASE EXCESS BLDA CALC-SCNC: 5.1 MMOL/L
BUN SERPL-MCNC: 19 MG/DL (ref 7–30)
BUN SERPL-MCNC: 19 MG/DL (ref 7–30)
CALCIUM SERPL-MCNC: 8.5 MG/DL (ref 8.5–10.1)
CALCIUM SERPL-MCNC: 8.5 MG/DL (ref 8.5–10.1)
CHLORIDE SERPL-SCNC: 105 MMOL/L (ref 94–109)
CHLORIDE SERPL-SCNC: 107 MMOL/L (ref 94–109)
CO2 SERPL-SCNC: 28 MMOL/L (ref 20–32)
CO2 SERPL-SCNC: 33 MMOL/L (ref 20–32)
COLOR FLD: NORMAL
CREAT SERPL-MCNC: 0.54 MG/DL (ref 0.66–1.25)
CREAT SERPL-MCNC: 0.63 MG/DL (ref 0.66–1.25)
ERYTHROCYTE [DISTWIDTH] IN BLOOD BY AUTOMATED COUNT: 15.3 % (ref 10–15)
GFR SERPL CREATININE-BSD FRML MDRD: >90 ML/MIN/{1.73_M2}
GFR SERPL CREATININE-BSD FRML MDRD: >90 ML/MIN/{1.73_M2}
GLUCOSE BLDC GLUCOMTR-MCNC: 129 MG/DL (ref 70–99)
GLUCOSE BLDC GLUCOMTR-MCNC: 145 MG/DL (ref 70–99)
GLUCOSE FLD-MCNC: 128 MG/DL
GLUCOSE SERPL-MCNC: 116 MG/DL (ref 70–99)
GLUCOSE SERPL-MCNC: 143 MG/DL (ref 70–99)
GRAM STN SPEC: NORMAL
HCO3 BLD-SCNC: 31 MMOL/L (ref 21–28)
HCO3 BLD-SCNC: 33 MMOL/L (ref 21–28)
HCT VFR BLD AUTO: 36.1 % (ref 40–53)
HGB BLD-MCNC: 11.1 G/DL (ref 13.3–17.7)
LACTATE BLD-SCNC: 0.6 MMOL/L (ref 0.7–2)
LACTATE BLD-SCNC: 0.6 MMOL/L (ref 0.7–2)
LDH FLD L TO P-CCNC: 99 U/L
LDH SERPL L TO P-CCNC: 286 U/L (ref 85–227)
MCH RBC QN AUTO: 28.2 PG (ref 26.5–33)
MCHC RBC AUTO-ENTMCNC: 30.7 G/DL (ref 31.5–36.5)
MCV RBC AUTO: 92 FL (ref 78–100)
NT-PROBNP SERPL-MCNC: 2652 PG/ML (ref 0–1800)
O2/TOTAL GAS SETTING VFR VENT: ABNORMAL %
O2/TOTAL GAS SETTING VFR VENT: ABNORMAL %
OXYHGB MFR BLD: 95 % (ref 92–100)
OXYHGB MFR BLD: 98 % (ref 92–100)
PCO2 BLD: 61 MM HG (ref 35–45)
PCO2 BLD: 62 MM HG (ref 35–45)
PH BLD: 7.31 PH (ref 7.35–7.45)
PH BLD: 7.33 PH (ref 7.35–7.45)
PHOSPHATE SERPL-MCNC: 2.4 MG/DL (ref 2.5–4.5)
PLATELET # BLD AUTO: 285 10E9/L (ref 150–450)
PO2 BLD: 128 MM HG (ref 80–105)
PO2 BLD: 94 MM HG (ref 80–105)
POTASSIUM SERPL-SCNC: 4.2 MMOL/L (ref 3.4–5.3)
POTASSIUM SERPL-SCNC: 4.4 MMOL/L (ref 3.4–5.3)
PROT FLD-MCNC: 2.6 G/DL
PROT SERPL-MCNC: 6.3 G/DL (ref 6.8–8.8)
RBC # BLD AUTO: 3.94 10E12/L (ref 4.4–5.9)
SODIUM SERPL-SCNC: 139 MMOL/L (ref 133–144)
SODIUM SERPL-SCNC: 141 MMOL/L (ref 133–144)
SPECIMEN SOURCE FLD: NORMAL
SPECIMEN SOURCE: NORMAL
WBC # BLD AUTO: 22.3 10E9/L (ref 4–11)
WBC # FLD AUTO: NORMAL /UL

## 2021-01-28 PROCEDURE — 82565 ASSAY OF CREATININE: CPT | Performed by: HOSPITALIST

## 2021-01-28 PROCEDURE — 83615 LACTATE (LD) (LDH) ENZYME: CPT | Performed by: HOSPITALIST

## 2021-01-28 PROCEDURE — 87205 SMEAR GRAM STAIN: CPT | Performed by: HOSPITALIST

## 2021-01-28 PROCEDURE — 999N000185 HC STATISTIC TRANSPORT TIME EA 15 MIN

## 2021-01-28 PROCEDURE — 80048 BASIC METABOLIC PNL TOTAL CA: CPT | Performed by: HOSPITALIST

## 2021-01-28 PROCEDURE — 250N000013 HC RX MED GY IP 250 OP 250 PS 637: Performed by: HOSPITALIST

## 2021-01-28 PROCEDURE — 250N000012 HC RX MED GY IP 250 OP 636 PS 637: Performed by: HOSPITALIST

## 2021-01-28 PROCEDURE — 99233 SBSQ HOSP IP/OBS HIGH 50: CPT | Performed by: HOSPITALIST

## 2021-01-28 PROCEDURE — 999N001014 HC STATISTIC CYTO WRIGHT STAIN TC: Performed by: HOSPITALIST

## 2021-01-28 PROCEDURE — 71045 X-RAY EXAM CHEST 1 VIEW: CPT

## 2021-01-28 PROCEDURE — 88112 CYTOPATH CELL ENHANCE TECH: CPT | Mod: TC | Performed by: HOSPITALIST

## 2021-01-28 PROCEDURE — 83605 ASSAY OF LACTIC ACID: CPT | Performed by: NURSE PRACTITIONER

## 2021-01-28 PROCEDURE — 82945 GLUCOSE OTHER FLUID: CPT | Performed by: HOSPITALIST

## 2021-01-28 PROCEDURE — 250N000011 HC RX IP 250 OP 636: Performed by: NURSE PRACTITIONER

## 2021-01-28 PROCEDURE — 210N000001 HC R&B IMCU HEART CARE

## 2021-01-28 PROCEDURE — 99291 CRITICAL CARE FIRST HOUR: CPT | Performed by: NURSE PRACTITIONER

## 2021-01-28 PROCEDURE — 999N000157 HC STATISTIC RCP TIME EA 10 MIN

## 2021-01-28 PROCEDURE — 83605 ASSAY OF LACTIC ACID: CPT | Performed by: HOSPITALIST

## 2021-01-28 PROCEDURE — 89051 BODY FLUID CELL COUNT: CPT | Performed by: HOSPITALIST

## 2021-01-28 PROCEDURE — 82805 BLOOD GASES W/O2 SATURATION: CPT | Performed by: NURSE PRACTITIONER

## 2021-01-28 PROCEDURE — 93010 ELECTROCARDIOGRAM REPORT: CPT | Performed by: INTERNAL MEDICINE

## 2021-01-28 PROCEDURE — 84157 ASSAY OF PROTEIN OTHER: CPT | Performed by: HOSPITALIST

## 2021-01-28 PROCEDURE — 999N001018 HC STATISTIC H-CELL BLOCK W/STAIN: Performed by: HOSPITALIST

## 2021-01-28 PROCEDURE — 250N000011 HC RX IP 250 OP 636: Performed by: INTERNAL MEDICINE

## 2021-01-28 PROCEDURE — 36600 WITHDRAWAL OF ARTERIAL BLOOD: CPT

## 2021-01-28 PROCEDURE — 94640 AIRWAY INHALATION TREATMENT: CPT

## 2021-01-28 PROCEDURE — 88112 CYTOPATH CELL ENHANCE TECH: CPT | Mod: 26 | Performed by: PATHOLOGY

## 2021-01-28 PROCEDURE — 87070 CULTURE OTHR SPECIMN AEROBIC: CPT | Performed by: HOSPITALIST

## 2021-01-28 PROCEDURE — 84100 ASSAY OF PHOSPHORUS: CPT | Performed by: NURSE PRACTITIONER

## 2021-01-28 PROCEDURE — 32555 ASPIRATE PLEURA W/ IMAGING: CPT

## 2021-01-28 PROCEDURE — 93005 ELECTROCARDIOGRAM TRACING: CPT

## 2021-01-28 PROCEDURE — 83880 ASSAY OF NATRIURETIC PEPTIDE: CPT | Performed by: NURSE PRACTITIONER

## 2021-01-28 PROCEDURE — 250N000011 HC RX IP 250 OP 636: Performed by: HOSPITALIST

## 2021-01-28 PROCEDURE — 36415 COLL VENOUS BLD VENIPUNCTURE: CPT | Performed by: NURSE PRACTITIONER

## 2021-01-28 PROCEDURE — 88305 TISSUE EXAM BY PATHOLOGIST: CPT | Mod: TC | Performed by: HOSPITALIST

## 2021-01-28 PROCEDURE — 999N001017 HC STATISTIC GLUCOSE BY METER IP

## 2021-01-28 PROCEDURE — 36415 COLL VENOUS BLD VENIPUNCTURE: CPT | Performed by: HOSPITALIST

## 2021-01-28 PROCEDURE — 85027 COMPLETE CBC AUTOMATED: CPT | Performed by: HOSPITALIST

## 2021-01-28 PROCEDURE — 0W9B3ZZ DRAINAGE OF LEFT PLEURAL CAVITY, PERCUTANEOUS APPROACH: ICD-10-PCS | Performed by: RADIOLOGY

## 2021-01-28 PROCEDURE — 250N000009 HC RX 250: Performed by: INTERNAL MEDICINE

## 2021-01-28 PROCEDURE — 80048 BASIC METABOLIC PNL TOTAL CA: CPT | Performed by: NURSE PRACTITIONER

## 2021-01-28 PROCEDURE — 84155 ASSAY OF PROTEIN SERUM: CPT | Performed by: HOSPITALIST

## 2021-01-28 PROCEDURE — 88305 TISSUE EXAM BY PATHOLOGIST: CPT | Mod: 26 | Performed by: PATHOLOGY

## 2021-01-28 PROCEDURE — 93970 EXTREMITY STUDY: CPT

## 2021-01-28 RX ORDER — NITROGLYCERIN 0.4 MG/1
0.4 TABLET SUBLINGUAL EVERY 5 MIN PRN
Status: DISCONTINUED | OUTPATIENT
Start: 2021-01-28 | End: 2021-01-30

## 2021-01-28 RX ORDER — LIDOCAINE 40 MG/G
CREAM TOPICAL
Status: DISCONTINUED | OUTPATIENT
Start: 2021-01-28 | End: 2021-01-30

## 2021-01-28 RX ORDER — CARBOXYMETHYLCELLULOSE SODIUM 5 MG/ML
1 SOLUTION/ DROPS OPHTHALMIC
Status: DISCONTINUED | OUTPATIENT
Start: 2021-01-28 | End: 2021-02-05 | Stop reason: HOSPADM

## 2021-01-28 RX ORDER — DEXTROSE MONOHYDRATE 25 G/50ML
25-50 INJECTION, SOLUTION INTRAVENOUS
Status: DISCONTINUED | OUTPATIENT
Start: 2021-01-28 | End: 2021-02-02

## 2021-01-28 RX ORDER — NICOTINE POLACRILEX 4 MG
15-30 LOZENGE BUCCAL
Status: DISCONTINUED | OUTPATIENT
Start: 2021-01-28 | End: 2021-02-02

## 2021-01-28 RX ORDER — LIDOCAINE HYDROCHLORIDE 10 MG/ML
10 INJECTION, SOLUTION EPIDURAL; INFILTRATION; INTRACAUDAL; PERINEURAL ONCE
Status: COMPLETED | OUTPATIENT
Start: 2021-01-28 | End: 2021-01-28

## 2021-01-28 RX ORDER — LORAZEPAM 2 MG/ML
.5-1 INJECTION INTRAMUSCULAR EVERY 4 HOURS PRN
Status: DISCONTINUED | OUTPATIENT
Start: 2021-01-28 | End: 2021-01-28

## 2021-01-28 RX ORDER — METHYLPREDNISOLONE SODIUM SUCCINATE 125 MG/2ML
60 INJECTION, POWDER, LYOPHILIZED, FOR SOLUTION INTRAMUSCULAR; INTRAVENOUS EVERY 12 HOURS
Status: DISCONTINUED | OUTPATIENT
Start: 2021-01-28 | End: 2021-01-30

## 2021-01-28 RX ORDER — LIDOCAINE 40 MG/G
CREAM TOPICAL
Status: DISCONTINUED | OUTPATIENT
Start: 2021-01-28 | End: 2021-01-28

## 2021-01-28 RX ORDER — NITROGLYCERIN 0.4 MG/1
0.4 TABLET SUBLINGUAL EVERY 5 MIN PRN
Status: DISCONTINUED | OUTPATIENT
Start: 2021-01-28 | End: 2021-01-28

## 2021-01-28 RX ORDER — ALBUTEROL SULFATE 90 UG/1
2 AEROSOL, METERED RESPIRATORY (INHALATION)
Status: DISCONTINUED | OUTPATIENT
Start: 2021-01-28 | End: 2021-02-05 | Stop reason: HOSPADM

## 2021-01-28 RX ADMIN — MORPHINE SULFATE 1 MG: 2 INJECTION, SOLUTION INTRAMUSCULAR; INTRAVENOUS at 09:54

## 2021-01-28 RX ADMIN — FLUTICASONE FUROATE AND VILANTEROL TRIFENATATE 1 PUFF: 200; 25 POWDER RESPIRATORY (INHALATION) at 08:26

## 2021-01-28 RX ADMIN — LIDOCAINE HYDROCHLORIDE 10 ML: 10 INJECTION, SOLUTION EPIDURAL; INFILTRATION; INTRACAUDAL; PERINEURAL at 14:21

## 2021-01-28 RX ADMIN — CEFEPIME HYDROCHLORIDE 2 G: 2 INJECTION, POWDER, FOR SOLUTION INTRAVENOUS at 02:19

## 2021-01-28 RX ADMIN — NALOXONE HYDROCHLORIDE 0.2 MG: 0.4 INJECTION, SOLUTION INTRAMUSCULAR; INTRAVENOUS; SUBCUTANEOUS at 14:46

## 2021-01-28 RX ADMIN — METRONIDAZOLE 500 MG: 500 INJECTION, SOLUTION INTRAVENOUS at 00:20

## 2021-01-28 RX ADMIN — IPRATROPIUM BROMIDE AND ALBUTEROL SULFATE 3 ML: .5; 3 SOLUTION RESPIRATORY (INHALATION) at 00:04

## 2021-01-28 RX ADMIN — IPRATROPIUM BROMIDE AND ALBUTEROL SULFATE 3 ML: .5; 3 SOLUTION RESPIRATORY (INHALATION) at 06:40

## 2021-01-28 RX ADMIN — MORPHINE SULFATE 1 MG: 2 INJECTION, SOLUTION INTRAMUSCULAR; INTRAVENOUS at 04:36

## 2021-01-28 RX ADMIN — FOLIC ACID 1 MG: 1 TABLET ORAL at 08:22

## 2021-01-28 RX ADMIN — UMECLIDINIUM 1 PUFF: 62.5 AEROSOL, POWDER ORAL at 08:25

## 2021-01-28 RX ADMIN — NALOXONE HYDROCHLORIDE 0.2 MG: 0.4 INJECTION, SOLUTION INTRAMUSCULAR; INTRAVENOUS; SUBCUTANEOUS at 11:18

## 2021-01-28 RX ADMIN — ALBUTEROL SULFATE 2 PUFF: 90 AEROSOL, METERED RESPIRATORY (INHALATION) at 04:37

## 2021-01-28 RX ADMIN — METHYLPREDNISOLONE SODIUM SUCCINATE 62.5 MG: 125 INJECTION, POWDER, FOR SOLUTION INTRAMUSCULAR; INTRAVENOUS at 13:46

## 2021-01-28 RX ADMIN — CEFEPIME HYDROCHLORIDE 2 G: 2 INJECTION, POWDER, FOR SOLUTION INTRAVENOUS at 10:02

## 2021-01-28 RX ADMIN — MORPHINE SULFATE 1 MG: 2 INJECTION, SOLUTION INTRAMUSCULAR; INTRAVENOUS at 00:20

## 2021-01-28 RX ADMIN — CEFEPIME HYDROCHLORIDE 2 G: 2 INJECTION, POWDER, FOR SOLUTION INTRAVENOUS at 18:12

## 2021-01-28 RX ADMIN — MORPHINE SULFATE 1 MG: 2 INJECTION, SOLUTION INTRAMUSCULAR; INTRAVENOUS at 02:19

## 2021-01-28 RX ADMIN — METRONIDAZOLE 500 MG: 500 INJECTION, SOLUTION INTRAVENOUS at 16:49

## 2021-01-28 RX ADMIN — PREDNISONE 40 MG: 20 TABLET ORAL at 08:22

## 2021-01-28 RX ADMIN — LORAZEPAM 0.5 MG: 2 INJECTION INTRAMUSCULAR; INTRAVENOUS at 05:06

## 2021-01-28 RX ADMIN — METHYLPREDNISOLONE SODIUM SUCCINATE 62.5 MG: 125 INJECTION, POWDER, FOR SOLUTION INTRAMUSCULAR; INTRAVENOUS at 23:50

## 2021-01-28 RX ADMIN — QUETIAPINE 25 MG: 25 TABLET ORAL at 09:51

## 2021-01-28 ASSESSMENT — ACTIVITIES OF DAILY LIVING (ADL)
ADLS_ACUITY_SCORE: 16
ADLS_ACUITY_SCORE: 17
ADLS_ACUITY_SCORE: 22
ADLS_ACUITY_SCORE: 16

## 2021-01-28 NOTE — PROGRESS NOTES
RADIOLOGY PROCEDURE NOTE  Patient name: Jah Tate  MRN: [id number]  : 1945    Pre-procedure diagnosis: Left pleural effusion  Post-procedure diagnosis: Same    Procedure Date/Time: 2021  2:42 PM  Procedure: Thoracentesis  Estimated blood loss: None  Specimen(s) collected with description: Pleural fluid.  The patient tolerated the procedure well with no immediate complications.    See imaging dictation for procedural details and findings.    Provider name: Jaylon Deras MD  Assistant(s):None

## 2021-01-28 NOTE — PLAN OF CARE
Transferred from ICU around 1630. A/Ox3-4, forgetful (hx dementia). VSS on 5L O2 NC. Tele: NSR, savita at times. ALONSO, even with speaking. Diminished/coarse LS, some wheezes noted. Congested/productive cough. MRSA negative and per ID, discontinue vanco if MRSA negative. PIV infusing NS 100ml/hr + int abx. Continent, using urinal. Did not get up this shift, appears to have very poor activity tolerance. Turn/repo in bed. Blanchable redness to coccyx, mepilex in place. Pulmonology consulted. ID following. Continue to monitor respiratory status.

## 2021-01-28 NOTE — PLAN OF CARE
VSS, on 8L O2 overnight, 5L NC baseline, LS coarse w/expiratory wheezes, complains of increased SOB, morphine given x3 with relief, PRN duoneb orders obtained, increased complaint of anxiety w/breathing per pt and PRN IV ativan obtained and given w/relief, PIV SL, voiding in urinal, encouraged shifting weight in bed, mepilex on coccyx d/t blanchable, tele SR, pt ripped off tele monitor and threw it along with urinal and water pitcher but refused to say why he was mad, not hostile toward staff, continue to monitor

## 2021-01-28 NOTE — PLAN OF CARE
Intermittently alert and able to verbalize his name and the president but also very lethargic. He was awake with oral cares and repositioning. VS stable, on bipap all afternoon and will continue to be so all night. He was turned and repositioned every two hours and bladder scanned for 350 at 1800, and was able to void. Plan for further monitoring of resp status.

## 2021-01-28 NOTE — PROGRESS NOTES
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  CT of the chest indicates cavitary lesion of the lungs.    Pneumonia of both lungs due to infectious organism, unspecified part of lung  Cavitary pneumonia  Masslike airspace opacity with concern for possible malignancy  End-stage COPD (FEV1 27%)  Left pleural effusion  Acute on chronic hypoxic respiratory failure    Patient is intermittently on 5 L of oxygen at home chronically, he continues to smoke half pack per day, currently presenting with 1 week history of shortness of breath, patient had received the COVID vaccine in the last week.    CT of the chest showed left lower lobe atelectatic cyst and a larger masslike airspace opacity in the lateral left upper lobe, which is new, there is also a spiculated airspace opacity in the right middle lobe.  There is associated hilar and mediastinal adenopathy.    Patient has history of chronic cough, currently he has a cavitary lesion in the left side of the lungs, the differential diagnosis includes cavitary pneumonia versus malignancy, tuberculosis was also a differential.    Initially was placed on BiPAP in the ER and admitted to the ICU, subsequently weaned down to 5 lpm of supplemental oxygen by nasal cannula and transferred out of the ICU on 1/27/21.    QuantiFERON gold negative.    Influenza and COVID-19 PCR negative on 1/25/21.    Infectious disease consulted, appreciate their assistance.    Pulmonology consulted, appreciate their assistance.    Continue cefepime and flagyl per ID. Vancomycin discontinued as nasal MRSA screen was negative.    Continue PTA Breo Ellipta and Incruse Ellipta. PRN albuterol available.    Continue steroids, switched to IV now that he is  on BiPAP.    Respiratory status deteriorated on 1/28/21. RRT called, see notes for details. Improved after he was placed on BiPAP. Transferred to [facility] status. Continue BiPAP for now.    S/p thoracentesis on 1/28/21 with 900 ml of fluid removed. Culture and cytology pending.    Goals of care    Very severe COPD (FEV1 27%) requiring 4-5 lpm of supplemental oxygen at baseline. Now admitted with severe pneumonia and possible underlying malignancy. Concerned about prognosis going forward. Also concerned that if he were to get to the point of requiring intubation, the likelihood of good outcome would be quite poor.    Discussed with patient and his daughter. He currently wants 'everything done' although given his acute illness I am not sure that he fully understands the conversation. His daughter feels that he would want everything done initially, but would not want to be kept alive on a ventilator for a prolonged period of time.    Discussed palliative care with the patient and his daughter, they are open to meeting with palliative care to further discuss goals of care.    Abdominal aortic aneurysm    CT study on 1/25/21 showed an abdominal aortic aneurysm measuring 5.8 cm compared to 3.57 cm from 8/2/2013.    Vascular surgery consulted, appreciate their assistance.    No acute intervention recommended at this time due to his co-morbidities. Likely plan for outpatient follow-up when/if he recovers from his pulmonary issues.    Chronic gout of multiple sites    Currently asymptomatic.    Moderate Dementia     Answers orientation questions appropriately, but seems forgetful at times.    Daughter confirms diagnosis of dementia and states he is forgetful but has been able to live independently with his wife.    Bleeding from scrotum    Pinpoint area of bleeding on left scrotum on 1/26/21.    Resolved with application of pressure by nursing.     Diet: Combination Diet Regular Diet Adult    DVT Prophylaxis: Pneumatic  Compression Devices  Cope Catheter: not present  Code Status: Full Code           Disposition Plan   Expected discharge: transferred to [facility] status today  Entered: Varun Edwards MD 01/28/2021, 11:28 AM       The patient's care was discussed with the Bedside Nurse, Care Coordinator/, Patient, Patient's Family, ID Consultant Consultant and Rapid Response Team.    I spent a total of 75 minutes with direct face-to-face contact with the patient today. 9:45 - 10:05 AM, 10:50 - 11:30 AM, and 3:05 - 3:20 PM.    Varun Edwards MD  Hospitalist Service  [facility]  Contact information available via [facility] Paging/Directory    ______________________________________________________________________    Interval History   Jah Tate was seen on several occasions today. Initially this morning, he stated he continued to feel short of breath. Had a cough productive of white phlegm. Denied fevers, chest pain, nausea, abdominal pain. Respiratory status subsequently deteriorated and an RRT was called. He has been placed on BiPAP and transferred to [facility]. Updated his daughter by phone this morning during RRT and again this afternoon.    Data reviewed today: I reviewed all medications, new labs and imaging results over the last 24 hours. I personally reviewed no images or EKG's today.    Physical Exam   Vital Signs: Temp: 97.7  F (36.5  C) Temp src: Axillary BP: (!) 171/82 Pulse: 122   Resp: 28 SpO2: 90 % O2 Device: Oxymask Oxygen Delivery: 10 LPM  Weight: 166 lbs 7.16 oz  Constitutional: awake, drowsy, in respiratory distress  Respiratory: bilateral crackles, increased work of breathing  Cardiovascular: regular rhythm, tachycardic, normal S1 and S2, no murmur noted  GI: normal bowel sounds, soft, non-distended, non-tender  Skin: warm, dry  Musculoskeletal: no lower extremity pitting edema present  Neurologic: awake, drowsy, intermittently follows commands    Data   Recent Labs   Lab  01/28/21  1129 01/28/21  0800 01/27/21  0613 01/26/21  0442 01/25/21  0948 01/25/21  0948   WBC  --  22.3* 12.9* 12.9*  --  23.1*   HGB  --  11.1* 10.0* 10.6*  --  12.5*   MCV  --  92 91 89  --  88   PLT  --  285 188 151  --  162   INR  --   --   --   --   --  1.24*    141 140 134  --  128*   POTASSIUM 4.4 4.2 4.4 4.8   < > 4.4   CHLORIDE 105 107 108 100  --  90*   CO2 28 33* 29 30  --  31   BUN 19 19 24 21  --  17   CR 0.54* 0.63* 0.52* 0.64*   < > 0.66   ANIONGAP 6 1* 3 4  --  7   SHEEBA 8.5 8.5 8.2* 8.6  --  9.1   * 116* 114* 132*  --  148*   ALBUMIN  --   --   --   --   --  2.6*   PROTTOTAL  --  6.3*  --   --   --  7.4   BILITOTAL  --   --   --   --   --  0.8   ALKPHOS  --   --   --   --   --  83   ALT  --   --   --   --   --  16   AST  --   --   --   --   --  17   TROPI  --   --   --   --   --  0.024    < > = values in this interval not displayed.     Medications     - MEDICATION INSTRUCTIONS -       - MEDICATION INSTRUCTIONS -       - MEDICATION INSTRUCTIONS -         ceFEPIme (MAXIPIME) IV  2 g Intravenous Q8H     fluticasone-vilanterol  1 puff Inhalation Daily     folic acid  1 mg Oral Daily     methylPREDNISolone  62.5 mg Intravenous Q12H     metroNIDAZOLE  500 mg Intravenous Q12H     [Held by provider] predniSONE  40 mg Oral Daily     sodium chloride 0.9 %  100 mL Intravenous Once     umeclidinium  1 puff Inhalation Daily

## 2021-01-28 NOTE — PROGRESS NOTES
"MD Notification    Notified Person: MD    Notified Person Name: Alejandro    Notification Date/Time: January 28, 2021, 4:46 AM    Notification Interaction: pgd    Purpose of Notification: 813-2 (DM)  Pt is visibly anxious and stating he is having \"trouble breathing\", no change in resp status, SaO2 stable, RRs in 30s, PRN morphine given for RRs. Can we add PRN Ativan?    Thanks,   KYLER Padgett *[id number]    Orders Received:  CXR and PRN ativan ordered by MD    Comments:    "

## 2021-01-28 NOTE — CONSULTS
PULMONOLOGY CONSULTATION  Date of service: 1/28/2021    [facility]    _____________________________________________________    Jah Tate  76 year old male  [id number]  [address] [address]    Primary Care Provider:  Eric Johnson  Admission Date: 1/25/2021  Hospital Attending Physician:  Jazmine Lewis MD  ________________________________________    CHIEF COMPLAINT : I was asked to see this patient by Dr. Edwards for evaluation of copd, resp failure   Informant: EHR and patient    HISTORY OF PRESENT ILLNESS     Jah Tate is a 76 year old male with past medical history significant for end stage copd on 2-5L of o2 at home. Has had the o2 for a few years, now using it more consistently over the last few months. Current everyday smoker. Admitted with new dense infiltrates. Was initially in icu and on bipap. Transferred out of the icu 1/27. Does have some degree of dementia and some confusion from pt regarding his home regimen. Reports he uses 2 different ellipta devices at home (likely breo and incruse but unsure of his compliance). Endorses cough, wheezing, productive sputum. Fatigued.      HOME MEDICATIONS     Medications Prior to Admission   Medication Sig Dispense Refill Last Dose     aspirin 81 MG tablet Take 1 tablet by mouth daily   1/24/2021 at Unknown time     docusate sodium (DULCOLAX STOOL SOFTENER) 100 MG capsule Take 2 capsules by mouth daily    1/24/2021 at Unknown time     fluticasone-vilanterol (BREO ELLIPTA) 200-25 MCG/INH inhaler Inhale 1 puff into the lungs daily 28 each 3 1/24/2021 at Unknown time     folic acid (FOLVITE) 1 MG tablet Take 1 tablet (1 mg) by mouth daily 90 tablet 3 1/24/2021 at Unknown time     INCRUSE ELLIPTA 62.5 MCG/INH Inhaler TAKE 1 PUFF BY MOUTH EVERY DAY 30 each 3 1/24/2021 at Unknown time     ipratropium - albuterol 0.5 mg/2.5 mg/3 mL (DUONEB) 0.5-2.5 (3) MG/3ML neb solution Take 1 vial by nebulization every 6 hours  as needed         predniSONE (DELTASONE) 5 MG tablet Take 5 mg by mouth daily   1/25/2021 at Unknown time     vitamin D3 (CHOLECALCIFEROL) 2000 units (50 mcg) tablet Take 1 tablet by mouth daily   1/24/2021 at Unknown time     albuterol (PROAIR HFA/PROVENTIL HFA/VENTOLIN HFA) 108 (90 Base) MCG/ACT inhaler INHALE 2 PUFFS BY MOUTH EVERY 6 HOURS AS NEEDED FOR SHORTNESS OF BREATH OR WHEEZING. 18 Inhaler 0        PAST MEDICAL HISTORY      Past Medical History:   Diagnosis Date     Bilateral lower extremity edema      COPD (chronic obstructive pulmonary disease) (H)      Dementia (H)      Gout      Past Surgical History:   Procedure Laterality Date     FRACTURE TX, ANKLE RT/LT      left- w/plate       ALLERGIES     Allergies   Allergen Reactions     Penicillins Swelling     Has tolerated Keflex, cefuroxime     Sulfa Drugs Swelling     Chantix [Varenicline Tartrate]      Hallucinations         SOCIAL / SUBSTANCE HISTORY     Social History     Socioeconomic History     Marital status:      Spouse name: Not on file     Number of children: Not on file     Years of education: Not on file     Highest education level: Not on file   Occupational History     Not on file   Social Needs     Financial resource strain: Not on file     Food insecurity     Worry: Not on file     Inability: Not on file     Transportation needs     Medical: Not on file     Non-medical: Not on file   Tobacco Use     Smoking status: Current Every Day Smoker     Packs/day: 0.50     Years: 60.00     Pack years: 30.00     Types: Cigarettes     Smokeless tobacco: Never Used   Substance and Sexual Activity     Alcohol use: No     Comment: 4-5 drinks per month     Drug use: No     Sexual activity: Yes   Lifestyle     Physical activity     Days per week: Not on file     Minutes per session: Not on file     Stress: Not on file   Relationships     Social connections     Talks on phone: Not on file     Gets together: Not on file     Attends Jainism service:  "Not on file     Active member of club or organization: Not on file     Attends meetings of clubs or organizations: Not on file     Relationship status: Not on file     Intimate partner violence     Fear of current or ex partner: Not on file     Emotionally abused: Not on file     Physically abused: Not on file     Forced sexual activity: Not on file   Other Topics Concern     Not on file   Social History Narrative     Not on file       FAMILY HISTORY   No family history on file.    REVIEW OF SYSTEMS   A comprehensive review of systems was negative except for items noted in HPI/Subjective.    PHYSICAL EXAMINATION   Temp (24hrs), Av  F (36.1  C), Min:96  F (35.6  C), Max:98.2  F (36.8  C)    Vital signs:  Temp: 96.5  F (35.8  C) Temp src: Axillary BP: (!) 144/66 Pulse: 108   Resp: 24 SpO2: 92 % O2 Device: Oxymask Oxygen Delivery: 7 LPM   Weight: 75.5 kg (166 lb 7.2 oz)  Estimated body mass index is 23.21 kg/m  as calculated from the following:    Height as of 20: 1.803 m (5' 11\").    Weight as of this encounter: 75.5 kg (166 lb 7.2 oz).        I/O last 3 completed shifts:  In: 1280 [P.O.:80; I.V.:1200]  Out: 1360 [Urine:1360]    CONSTITUTIONAL/GENERAL APPEARANCE: Alert male. Frail  PSYCHIATRIC: Pleasant and appropriate mood and affect.  EARS, NOSE,THROAT,MOUTH: External ears and nose overall normal.  NECK: Neck appearance normal. No neck masses and the thyroid not enlarged.   RESPIRATORY: dec, coarse, mild wheezing  CARDIOVASCULAR: S1, S2, regular rate and rhythm  ABDOMEN: round, soft  LYMPHATIC: no cervical lymphadenopathy.  SKIN: Skin color was normal.    LABORATORY ASSESSMENT    Arterial Blood Gas  Recent Labs   Lab 21  0948   O2PER 50     CBC  Recent Labs   Lab 21  0800 21  0613 21  0442 21  0948   WBC 22.3* 12.9* 12.9* 23.1*   RBC 3.94* 3.55* 3.73* 4.39*   HGB 11.1* 10.0* 10.6* 12.5*   HCT 36.1* 32.4* 33.3* 38.5*   MCV 92 91 89 88   MCH 28.2 28.2 28.4 28.5   MCHC 30.7* 30.9* " 31.8 32.5   RDW 15.3* 15.4* 15.1* 15.0    188 151 162     BMP  Recent Labs   Lab 01/28/21  0800 01/27/21  0613 01/26/21  0442 01/25/21  1519 01/25/21  0948    140 134  --  128*   POTASSIUM 4.2 4.4 4.8 4.6 4.4   CHLORIDE 107 108 100  --  90*   SHEEBA 8.5 8.2* 8.6  --  9.1   CO2 33* 29 30  --  31   BUN 19 24 21  --  17   CR 0.63* 0.52* 0.64* 0.64* 0.66   * 114* 132*  --  148*     INR  Recent Labs   Lab 01/25/21  0948   INR 1.24*      BNPNo lab results found in last 7 days.  VENOUS BLOOD GASES  Recent Labs   Lab 01/25/21  0948   PHV 7.31*   PCO2V 65*   PO2V 58*   HCO3V 33*   JUANA 4.3         Additional labs and/or comments:     IMAGING      CT Chest 1/25:  1.  Left lower lobe atelectasis was present on the prior study and is  not significantly changed. Large masslike airspace opacity in the  lateral left upper lobe is new and likely infectious given its rapid  development. The spiculated airspace opacity in the right middle lobe  is also completely new compared to the prior study and more likely to  represent infection. Hilar and mediastinal adenopathy is assumed to be  reactive. The possibility of aggressive malignancy cannot be entirely  excluded. Consider bronchoscopy.  2.  Infrarenal abdominal aortic aneurysm measures 5.8 cm compared to  3.7 cm on 8/2/2013. Vascular surgery referral recommended.      cxr 1/28: Interval increase in density involving the left lung most compatible with increasing pneumonia with interstitial component. More focal dense component along the lateral aspect left perihilar region remain stable. Mild atelectasis medial   aspect right lung base with prominence of the right hilar region related to the focal soft tissue density in this area on the CT. Mild cardiac enlargement. Normal pulmonary vascularity.    PFT & OTHER TESTING       ASSESSMENT / PLAN      Pulmonary diagnoses:  Abnl CT/CXR R91.8  Adenopathy R59.9  COPD exacerb J44.1  Cough R05  Hypoxemia R09.02  Nicotine depend  F17.210  Pleural effusion  Pneumonia unspec J18.9  Resp fail acute J96.00  Resp fail chronic J96.10      ASSESSMENT : 76M, very severe o2 dep copd admitted with resp failure and dense bilateral L>R infiltrates. Slowly improving with abx and steroids. ? Aspiration. Too unstable for bronch, does have likely parapneumonic effusion. Given the relatively rapid development of infiltrates, suspect infectious rather than malignancy but malignancy remains a possibility.      PLAN:     Suggest left sided diagnostic / therapeutic thoracentesis    Wean o2, goal sat ~ 88%    Continue abx    Continue steroids    Continue bronchodilators    Flutter valve and IS    Follow Sputum/blood cultures    Too unstable for bronch at this time    Anticipate extended course of abx. Will need outpt imaging in 8 weeks    Will follow      Varun Santana  [facility]  Office: [phone]  Pager: [phone]

## 2021-01-28 NOTE — PROGRESS NOTES
X cover    Called about some ongoing sob, discussed with RN    Noted transfer out of icu and eating and drinking    Ordered SL CAROLIN, jelani prn q 4      Ross Allen MD

## 2021-01-28 NOTE — PROGRESS NOTES
Pedro Pablo's test performed prior to radial ABG draw. Collateral circulation confirmed.  Site: Right radial  FiO2: 80  Device- Bipap        Juan Bowen, RT  1/28/2021

## 2021-01-28 NOTE — PLAN OF CARE
"Vitals: Sinus Tach improving.   Lungs: left lung clear. Post thoracentesis of 900mL. Right side severely diminished. On Bipap. ABG recheck with little change. Continue x1 hour post thoracentesis and NP Nubia will round back this afternoon to reassess need.   CV: NSR/ST 60s-120.   GI: Abdomen slightly rounded. Soft. No tenderness.   Uro: Pt. Arrived with dribbling. Urinary retention of 340cc and refused/or unable to void. Order to place straight cath. During thoracentesis pt. Incontinent (and lethargic) Re scan for 158 mL. Updated MD. V.O. Dr. Edwards ok to hold on burnham and monitor.   CMS: WNL.   Neuro: FEDE, uncooperative. Good strength and moving all extremities.   Became lethargic again. V.O. from NP Nubia to give IV Narcan. Pt. Alert again post narcan and remained uncooperative due to frustration with BiPap. Did answer he is in the hospital.   Skin: Large bruise to left back/flank area. Dry flaky skin.   Psych: Pt. Arrived agitated about BiPAP. Trying to remove despite reorientation/education. Refusing to answer questions and replied \"Suck off\" Pt. Fixed on wanting water. Ice chip and mouth cares provided but continued to refuse to follow commands and answer questions. Sitter present.   MSK:  Bedrest during shift.   Pain: No nonverbal s/sx of pain noted.   Labs: BNP 2652 (increase from admission). Lactic 0.6 BMP WNL WBC 22.3 - increased   Tests/Procedures: repeat xray. EKG. Left sided thoracentesis done.   LE ultrasound results pending. TTE procedure pending.   Drains/Lines: 2 peripheral IVs in Left lower arm.        "

## 2021-01-28 NOTE — CODE/RAPID RESPONSE
[facility]    RRT Note  1/28/2021   Time Called: 1057am    RRT called for: increased WOB, increased FiO2 requirements, decr LOC    Assessment & Plan   IMPRESSION & PLAN:    Jah Tate is a 76 year old male w/ PMH of 5 L O2 dependent COPD (addendum 2pm, FEV1 27% as of 8/2020), AAA, moderate dementia, moderate aortic stenosis who was admitted on 1/25/2021 for for severe pneumonia with cavitary lesion and possible mass-like lesion with culture growing lactose fermenting gram-negative rods.    Since coming out of intensive care on 1/27/2021 patient has been weaned down to 5 L oxygen.  By morning time 01/28/21 patient was on 8 L.  He received a dose of morphine for air hunger at approximately 10 AM.  By 1037 patient was working harder to breathe had been complained of malaise, had decreased level of consciousness, increasing FiO2 requirements as he was satting only 89% on the 8 L.  FiO2 was increased to 10 L.  For these reasons rapid response was called as was the attending hospitalist physician.    On my arrival patient has a gray appearing face, gurgling respirations, accessory muscle use with sternocleidomastoid involvement.  He was quickly started on NIPPV as work-up ensued.    Impression  Acute on chronic hypoxic and likely hypercarbic respiratory failure of likely multifactorial etiology  Differential diagnosis:  -Possibly worsening pneumonia given rising leukocytosis although he is on steroids  -Increasing size of left pleural effusion  -Possibly component of cardiogenic pulmonary edema, suspect a degree of pulmonary hypertension given his chronic lung disease  -Have to consider VTE as he is only on mechanical DVT prophylaxis which not currently on (correction at 1345 pt ruled out for PE on 1/25/21 at time of admission)  -May be a component of medications given recent opioid use    INTERVENTIONS:  -stat NIPPV 12/7, 1.0--> decr FiO2 to 0.8, Vt 350-500, Ve 7-8L, FiO2 later  decreased to 0.7 at noon  -gluc 0129 milligrams/deciliter  -stat abg--> acute/chronic resp acidosis w/ secondary met alkalosis  -Narcan 0.2 mg IV given with increased mentation  -stat proBNP, lactic acid  -add on phos  -stat ekg--> no acute ischemic changes  -repeat echo to r/o pulm htn & reeval AORTIC STENOSIS  -bilat LE duplex; if neg can consider CTA PE protocol  -repeat abg at 1330  -increase frequency of prn bronchodilators  -Change steroids to IV  -As needed NT suctioning    Working diagnosis: Multifactorial etiology contributing to acute on chronic hypoxic and hypercarbic respiratory failure    At the end of the RRT ABG has been obtained and results reviewed, work of breathing has improved with initiation of NIPPV, mentation has improved.    Disposition: transfer to [facility]    Discussed with and defer further cares to hosptialist attending MD Dr. Edwards who kindly informed pt's family of change in condition.  Recommend ongoing discussions regarding potentially limiting resuscitative efforts given AAA, suspected severe COPD and dementia.    Code Status: Full Code    Allergies   Allergies   Allergen Reactions     Penicillins Swelling     Has tolerated Keflex, cefuroxime     Sulfa Drugs Swelling     Chantix [Varenicline Tartrate]      Hallucinations         Physical Exam   Vital Signs with Ranges:  Temp:  [96  F (35.6  C)-98.2  F (36.8  C)] 98.1  F (36.7  C)  Pulse:  [] 108  Resp:  [15-28] 18  BP: (109-171)/(55-84) 130/75  FiO2 (%):  [8 %] 8 %  SpO2:  [80 %-100 %] 100 %  I/O last 3 completed shifts:  In: 1280 [P.O.:80; I.V.:1200]  Out: 1360 [Urine:1360]    Constitutional: mod to severe acute distress  Neuro: RASS -3, very weakly follows commands to show thumbs up in the time to stick out tongue, speech fluent  HEENT: Dry oral mucosa  Neck: supple  Heart: S1S2, sinus tachycardia  Lungs: Weak cough, rhonchi bilateral upper and lower lobe  Abd:normoactive bowel sounds, soft, nontender, nondisteded  Ext: no edema  warm, no mottling, no diaphoresis    Data     EKG:  Interpreted by JULIA Canela CNP  Time reviewed: 11:46 AM  Symptoms at time of EKG: Dyspnea  Rhythm: normal sinus   Rate: Tachycardia  Axis: Normal  Ectopy: none  Conduction: normal  ST Segments/ T Waves: Subtle T wave inversions in aVL but no other contiguous changes  Q Waves: none  Comparison to prior: Similar when compared with 6/24/2020 study    ABG:  -  Recent Labs   Lab 01/28/21  1122   PH 7.31*   PCO2 61*   PO2 94   HCO3 31*   O2PER 80%       Troponin:    Recent Labs   Lab Test 01/25/21  0948   TROPI 0.024       IMAGING: (X-ray/CT/MRI)   Recent Results (from the past 24 hour(s))   XR Chest Port 1 View    Narrative    EXAM: XR CHEST PORT 1 VW  LOCATION: [facility]  DATE/TIME: 1/28/2021 5:15 AM    INDICATION: Shortness of breath with history of bilateral pneumonia.  COMPARISON: CT 1/26/2021 and 1/25/2021. Chest radiograph 01/25/2021.      Impression    IMPRESSION: Interval increase in density involving the left lung most compatible with increasing pneumonia with interstitial component. More focal dense component along the lateral aspect left perihilar region remain stable. Mild atelectasis medial   aspect right lung base with prominence of the right hilar region related to the focal soft tissue density in this area on the CT. Mild cardiac enlargement. Normal pulmonary vascularity.       CBC with Diff:  Recent Labs   Lab Test 01/28/21  0800 01/25/21  0948 01/25/21  0948   WBC 22.3*   < > 23.1*   HGB 11.1*   < > 12.5*   MCV 92   < > 88      < > 162   INR  --   --  1.24*    < > = values in this interval not displayed.        Comprehensive Metabolic Panel:  Recent Labs   Lab 01/28/21  1129 01/28/21  0800 01/25/21  1519 01/25/21  1519 01/25/21  0948    141   < >  --  128*   POTASSIUM 4.4 4.2   < > 4.6 4.4   CHLORIDE 105 107   < >  --  90*   CO2 28 33*   < >  --  31   ANIONGAP 6 1*   < >  --  7   * 116*   < >  --  148*   BUN  19 19   < >  --  17   CR 0.54* 0.63*   < > 0.64* 0.66   GFRESTIMATED >90 >90   < > >90 >90   GFRESTBLACK >90 >90   < > >90 >90   SHEEBA 8.5 8.5   < >  --  9.1   MAG  --   --   --  2.6* 2.1   PHOS 2.4*  --   --   --   --    PROTTOTAL  --  6.3*  --   --  7.4   ALBUMIN  --   --   --   --  2.6*   BILITOTAL  --   --   --   --  0.8   ALKPHOS  --   --   --   --  83   AST  --   --   --   --  17   ALT  --   --   --   --  16    < > = values in this interval not displayed.       INR:    Recent Labs   Lab Test 01/25/21  0948   INR 1.24*       Time Spent on this Encounter   I spent 55 minutes (1105am-1200pm) of critical care time on the unit/floor managing the care of Jah Tate. Upon evaluation, this patient had a high probability of imminent or life-threatening deterioration due to acute on chronic hypoxic and hypercarbic respiratory failure, which required my direct attention, intervention, and personal management. 100% of my time was spent at the bedside counseling the patient and/or coordinating care regarding services listed in this note.    Nubia Bellamy, [facility]  [phone]

## 2021-01-28 NOTE — PROVIDER NOTIFICATION
MD Notification    Notified Person: MD    Notified Person Name:  Jerry    Notification Date/Time: 1/28 0816    Notification Interaction: Page    Purpose of Notification: FYI WBC up to 22.3, CXR showing worsening pneumonia. Pt feeling worse, up to 8L O2. Please advise, thank you!    Orders Received:    Comments:

## 2021-01-28 NOTE — PLAN OF CARE
OT/PT:Chart reviewed-- pt. not appropriate for therapy intervention this date due to respiratory issues. Will reschedule/check-back 1/29.

## 2021-01-28 NOTE — PROGRESS NOTES
House NP note    I am following up on pt post RRT.      Pt was still sleepy on NIPPV, initially w/ Ve in 5L, improved to 6-7L after mask adjustment.  Repeat ABG minimally changed.  He just had thoracentesis w/ removal of 900mL fluild.  After that Ve 9-10L though simultaneously getting LE duplex. Anticipate that Ve should remain at least in the 9-10L range with decrease in size of pleural effusion  -will recheck pt in next hour when he's not being stimulated to reevaluate if he needs titration of bilevel pressures  -decreased FiO2 to 0.6 at 1340.   -can also consider lasix if needed pending echo  -will have RN repeat narcan dose    Nubia Bellamy CNP  Hospitalist - [place] [facility]  [phone]     Text Page

## 2021-01-28 NOTE — PROGRESS NOTES
9030-5500: A/Ox3-4, very forgetful (hx dementia). Pt found with O2 off, satting 80% on RA. Pt quickly recovered to mid 90s after increasing to 15L temporarily. Weaned back down to 8L, satting low 90s. 0515 CXR showing worsening pneumonia, WBC up to 22.3 - notified MD. Pulmonology rounded and rec L sided thora. Pt given flutter valve, on scheduled prednisone/inhalers. Later this AM, pt c/o worsening SOB and given PRN Morphine with good effect initially but then required 10L, and became more lethargic. BP started rising, up to 171/82. Resp 24-28. RRT called and pt placed on BiPAP. 0.2mg of narcan given, effective and pt became more alert. PrBNP 2,652, blood gases drawn - see results. Lactic 0.6. Afebrile. Tachy HR in 110s-120s. Coarse LS with productive cough. Some wheezes noted. Ultimately transferred to [facility] Report given to nurse and pt transferred to CCU.

## 2021-01-28 NOTE — PROGRESS NOTES
[facility]    Infectious Disease Progress Note    Date of Service (when I saw the patient): 01/28/2021     Assessment & Plan   Jah Tate is a 76 year old male who was admitted on 1/25/2021.     Impression:  1. 76 y.o male with end stage COPD, on 5 L of oxygen at home.   2. Current every day smoker.   3. Presenting with 1 week history of shortness of breath, patient had received Covid vaccine in the last week.  4. CT of the chest shows left lower lobe atelectatic cyst and a larger masslike airspace opacity in the lateral left upper lobe, which is new, there is also a spiculated airspace opacity in the right middle lobe.  There is associated hilar and mediastinal adenopathy.  5. Absolutely no risk factors for TB, never travelled out of [place] per patient, no dwelling in any shelter, jailhouse or [facility]. Quantiferon TB is negative, out of iso now.   7. PCN listed as allergy.        Recommendations:  Continue on cefepime and flagyl   MRSA PCR neg, vanco stopped   Leucocytosis on steroids.   Appears ill , consider goals of care discussion, has end stage COPD       Anna Estrada MD    Interval History   No fever   Quant tb is neg   Slightly more short of breath today     Physical Exam   Temp: 98.1  F (36.7  C) Temp src: Axillary BP: 130/75 Pulse: 108   Resp: 18 SpO2: 100 % O2 Device: BiPAP/CPAP Oxygen Delivery: 10 LPM  Vitals:    01/25/21 0942 01/26/21 0353 01/27/21 0400   Weight: 78.9 kg (174 lb) 73 kg (160 lb 15 oz) 75.5 kg (166 lb 7.2 oz)     Vital Signs with Ranges  Temp:  [96  F (35.6  C)-98.2  F (36.8  C)] 98.1  F (36.7  C)  Pulse:  [] 108  Resp:  [15-28] 18  BP: (109-171)/(55-84) 130/75  FiO2 (%):  [8 %] 8 %  SpO2:  [80 %-100 %] 100 %    Constitutional: Awake,  Lungs: using accessory muscles, coarse   Cardiovascular: Regular rate and rhythm, normal S1 and S2, and no murmur noted  Abdomen: Normal bowel sounds, soft, non-distended, non-tender  Skin: No rashes, no cyanosis, no  edema  Other:    Medications     - MEDICATION INSTRUCTIONS -       - MEDICATION INSTRUCTIONS -       - MEDICATION INSTRUCTIONS -         ceFEPIme (MAXIPIME) IV  2 g Intravenous Q8H     fluticasone-vilanterol  1 puff Inhalation Daily     folic acid  1 mg Oral Daily     methylPREDNISolone  62.5 mg Intravenous Q12H     metroNIDAZOLE  500 mg Intravenous Q12H     [Held by provider] predniSONE  40 mg Oral Daily     sodium chloride 0.9 %  100 mL Intravenous Once     umeclidinium  1 puff Inhalation Daily       Data   All microbiology laboratory data reviewed.  Recent Labs   Lab Test 01/28/21  0800 01/27/21  0613 01/26/21  0442   WBC 22.3* 12.9* 12.9*   HGB 11.1* 10.0* 10.6*   HCT 36.1* 32.4* 33.3*   MCV 92 91 89    188 151     Recent Labs   Lab Test 01/28/21  1129 01/28/21  0800 01/27/21  0613   CR 0.54* 0.63* 0.52*     No lab results found.  Recent Labs   Lab Test 01/26/21  2130 01/26/21  1210 01/26/21  0442 01/25/21  1016 06/25/20  2000 04/06/20  1748   CULT Light growth  Normal oma    Light growth  Non lactose fermenting gram negative rods  *  Culture in progress Canceled, Test credited  >10 Squamous epithelial cells/low power field indicates oral contamination. Please   recollect.  *  Notification of test cancellation was given to  JUAN ALBERTO COON RN 01/26/21 [address] [place] [address].   No growth after 2 days No growth after 3 days Heavy growth  Normal oma   No growth  No growth       Attestation:  Total time on the floor involved in the patient's care: 35 minutes. Total time spent in counseling/care coordination: >50%

## 2021-01-29 ENCOUNTER — APPOINTMENT (OUTPATIENT)
Dept: CARDIOLOGY | Facility: CLINIC | Age: 76
DRG: 193 | End: 2021-01-29
Attending: NURSE PRACTITIONER
Payer: COMMERCIAL

## 2021-01-29 LAB
ANION GAP SERPL CALCULATED.3IONS-SCNC: 3 MMOL/L (ref 3–14)
BUN SERPL-MCNC: 24 MG/DL (ref 7–30)
CALCIUM SERPL-MCNC: 8.3 MG/DL (ref 8.5–10.1)
CHLORIDE SERPL-SCNC: 106 MMOL/L (ref 94–109)
CO2 SERPL-SCNC: 32 MMOL/L (ref 20–32)
CREAT SERPL-MCNC: 0.57 MG/DL (ref 0.66–1.25)
ERYTHROCYTE [DISTWIDTH] IN BLOOD BY AUTOMATED COUNT: 15.3 % (ref 10–15)
GFR SERPL CREATININE-BSD FRML MDRD: >90 ML/MIN/{1.73_M2}
GLUCOSE SERPL-MCNC: 143 MG/DL (ref 70–99)
HCT VFR BLD AUTO: 32 % (ref 40–53)
HGB BLD-MCNC: 9.9 G/DL (ref 13.3–17.7)
MAGNESIUM SERPL-MCNC: 2.3 MG/DL (ref 1.6–2.3)
MCH RBC QN AUTO: 28.4 PG (ref 26.5–33)
MCHC RBC AUTO-ENTMCNC: 30.9 G/DL (ref 31.5–36.5)
MCV RBC AUTO: 92 FL (ref 78–100)
PLATELET # BLD AUTO: 238 10E9/L (ref 150–450)
POTASSIUM SERPL-SCNC: 4.7 MMOL/L (ref 3.4–5.3)
RBC # BLD AUTO: 3.49 10E12/L (ref 4.4–5.9)
SODIUM SERPL-SCNC: 141 MMOL/L (ref 133–144)
WBC # BLD AUTO: 11.3 10E9/L (ref 4–11)

## 2021-01-29 PROCEDURE — 250N000011 HC RX IP 250 OP 636: Performed by: NURSE PRACTITIONER

## 2021-01-29 PROCEDURE — 83735 ASSAY OF MAGNESIUM: CPT | Performed by: HOSPITALIST

## 2021-01-29 PROCEDURE — 250N000013 HC RX MED GY IP 250 OP 250 PS 637: Performed by: HOSPITALIST

## 2021-01-29 PROCEDURE — 99233 SBSQ HOSP IP/OBS HIGH 50: CPT | Performed by: HOSPITALIST

## 2021-01-29 PROCEDURE — 80048 BASIC METABOLIC PNL TOTAL CA: CPT | Performed by: HOSPITALIST

## 2021-01-29 PROCEDURE — 999N000147 HC STATISTIC PT IP EVAL DEFER: Performed by: PHYSICAL THERAPIST

## 2021-01-29 PROCEDURE — 210N000001 HC R&B IMCU HEART CARE

## 2021-01-29 PROCEDURE — 36415 COLL VENOUS BLD VENIPUNCTURE: CPT | Performed by: HOSPITALIST

## 2021-01-29 PROCEDURE — 99207 PR CONSULT E&M CHANGED TO INITIAL LEVEL: CPT | Performed by: NURSE PRACTITIONER

## 2021-01-29 PROCEDURE — 93306 TTE W/DOPPLER COMPLETE: CPT | Mod: 26 | Performed by: INTERNAL MEDICINE

## 2021-01-29 PROCEDURE — 85027 COMPLETE CBC AUTOMATED: CPT | Performed by: HOSPITALIST

## 2021-01-29 PROCEDURE — 999N000208 ECHOCARDIOGRAM COMPLETE

## 2021-01-29 PROCEDURE — 255N000002 HC RX 255 OP 636: Performed by: INTERNAL MEDICINE

## 2021-01-29 PROCEDURE — 250N000011 HC RX IP 250 OP 636: Performed by: HOSPITALIST

## 2021-01-29 PROCEDURE — 99223 1ST HOSP IP/OBS HIGH 75: CPT | Performed by: NURSE PRACTITIONER

## 2021-01-29 RX ORDER — FUROSEMIDE 10 MG/ML
20 INJECTION INTRAMUSCULAR; INTRAVENOUS ONCE
Status: COMPLETED | OUTPATIENT
Start: 2021-01-29 | End: 2021-01-29

## 2021-01-29 RX ADMIN — METHYLPREDNISOLONE SODIUM SUCCINATE 62.5 MG: 125 INJECTION, POWDER, FOR SOLUTION INTRAMUSCULAR; INTRAVENOUS at 12:29

## 2021-01-29 RX ADMIN — METRONIDAZOLE 500 MG: 500 INJECTION, SOLUTION INTRAVENOUS at 12:48

## 2021-01-29 RX ADMIN — FLUTICASONE FUROATE AND VILANTEROL TRIFENATATE 1 PUFF: 200; 25 POWDER RESPIRATORY (INHALATION) at 09:23

## 2021-01-29 RX ADMIN — CEFEPIME HYDROCHLORIDE 2 G: 2 INJECTION, POWDER, FOR SOLUTION INTRAVENOUS at 17:03

## 2021-01-29 RX ADMIN — DOCUSATE SODIUM 50 MG AND SENNOSIDES 8.6 MG 2 TABLET: 8.6; 5 TABLET, FILM COATED ORAL at 09:23

## 2021-01-29 RX ADMIN — CEFEPIME HYDROCHLORIDE 2 G: 2 INJECTION, POWDER, FOR SOLUTION INTRAVENOUS at 01:07

## 2021-01-29 RX ADMIN — HUMAN ALBUMIN MICROSPHERES AND PERFLUTREN 9 ML: 10; .22 INJECTION, SOLUTION INTRAVENOUS at 11:30

## 2021-01-29 RX ADMIN — FUROSEMIDE 20 MG: 10 INJECTION, SOLUTION INTRAVENOUS at 15:49

## 2021-01-29 RX ADMIN — CEFEPIME HYDROCHLORIDE 2 G: 2 INJECTION, POWDER, FOR SOLUTION INTRAVENOUS at 09:22

## 2021-01-29 RX ADMIN — UMECLIDINIUM 1 PUFF: 62.5 AEROSOL, POWDER ORAL at 09:23

## 2021-01-29 RX ADMIN — METRONIDAZOLE 500 MG: 500 INJECTION, SOLUTION INTRAVENOUS at 00:03

## 2021-01-29 RX ADMIN — ENOXAPARIN SODIUM 40 MG: 40 INJECTION SUBCUTANEOUS at 17:04

## 2021-01-29 RX ADMIN — FOLIC ACID 1 MG: 1 TABLET ORAL at 09:23

## 2021-01-29 ASSESSMENT — ACTIVITIES OF DAILY LIVING (ADL)
ADLS_ACUITY_SCORE: 21
ADLS_ACUITY_SCORE: 21
ADLS_ACUITY_SCORE: 22
ADLS_ACUITY_SCORE: 21

## 2021-01-29 NOTE — PLAN OF CARE
PT: Orders received on 1/25 but pt has not been appropriate for PT eval through today. Order will be completed. Please reorder PT when pt is appropriate.

## 2021-01-29 NOTE — PROGRESS NOTES
"Increased confusion. Tearful. Listing off his family and talking about dying. \"I am afraid.\" When asked about what and listed reasons stated \"not dying. I don't care about that. If I die. I die. But I don't know where my wife is. I don't know where she is at. My breathing.\"   Writer reoriented patient and when asked again a couple min. Later could not answer orientation questions.   All other neuros intact. Speech is clear.   Dr. Edwards updated and will see pt. Shortly.   "

## 2021-01-29 NOTE — PROGRESS NOTES
[facility]    Medicine Progress Note - Hospitalist Service       Date of Admission:  1/25/2021  Assessment & Plan        Jah Tate is a 76 year old male with end-stage COPD on 5 L of oxygen at home presents to the emergency department with worsening shortness of breath.  CT of the chest indicates cavitary lesion of the lungs.    Pneumonia of both lungs due to infectious organism, unspecified part of lung  Cavitary pneumonia  Masslike airspace opacity with concern for possible malignancy  End-stage COPD (FEV1 27%)  Left pleural effusion  Acute on chronic hypoxic respiratory failure    Patient is intermittently on 5 L of oxygen at home chronically, he continues to smoke half pack per day, currently presenting with 1 week history of shortness of breath, patient had received the COVID vaccine in the last week.    CT of the chest showed left lower lobe atelectatic cyst and a larger masslike airspace opacity in the lateral left upper lobe, which is new, there is also a spiculated airspace opacity in the right middle lobe.  There is associated hilar and mediastinal adenopathy.    Patient has history of chronic cough, currently he has a cavitary lesion in the left side of the lungs, the differential diagnosis includes cavitary pneumonia versus malignancy, tuberculosis was also a differential.    QuantiFERON gold negative.    Influenza and COVID-19 PCR negative on 1/25/21.    Infectious disease and pulmonology consulted, appreciate their assistance.    Continue cefepime and flagyl per ID. Vancomycin discontinued as nasal MRSA screen was negative.    Continue PTA Breo Ellipta and Incruse Ellipta. PRN albuterol available.    Continue steroids, switched to IV for now as he has been off/on BiPAP.    Initially was placed on BiPAP in the ER and admitted to the ICU, subsequently weaned down to 5 lpm of supplemental oxygen by nasal cannula and transferred out of the ICU on 1/27/21.    Respiratory  status deteriorated on 1/28/21. RRT called, see notes for details. Improved after he was placed on BiPAP. Transferred to [facility] status.    Has been off BiPAP since about 03:00 this morning. Respiratory status deteriorated again this morning, required BiPAP however patient removed it and refused to have it replaced. Seems to be doing OK now on 4 lpm of supplemental oxygen by oxymizer.    S/p thoracentesis on 1/28/21 with 900 ml of fluid removed. Culture negative to date. Cytology pending.    Goals of care    Very severe COPD (FEV1 27%) requiring 4-5 lpm of supplemental oxygen at baseline. Now admitted with severe pneumonia and possible underlying malignancy. Concerned about prognosis going forward. Also concerned that if he were to get to the point of requiring intubation, the likelihood of good outcome would be quite poor.    Discussed with patient and his daughter. He currently wants 'everything done' although given his acute illness I am not sure that he fully understands the conversation. His daughter feels that he would want everything done initially, but would not want to be kept alive on a ventilator for a prolonged period of time.    Palliative care consulted, appreciate their assistance, see notes for details.    Patient remains full code, but states he wouldn't want to be kept alive on machines for a prolonged period of time.    Abdominal aortic aneurysm    CT study on 1/25/21 showed an abdominal aortic aneurysm measuring 5.8 cm compared to 3.57 cm from 8/2/2013.    Vascular surgery consulted, appreciate their assistance.    No acute intervention recommended at this time due to his co-morbidities. Likely plan for outpatient follow-up when/if he recovers from his pulmonary issues.    Chronic gout of multiple sites    Currently asymptomatic.    Moderate Dementia     Answers orientation questions appropriately, but seems forgetful at times.    Daughter confirms diagnosis of dementia and states he is forgetful but  has been able to live independently with his wife.    Plan of care has been discussed with patient and also with his daughter.    Bleeding from scrotum    Pinpoint area of bleeding on left scrotum on 1/26/21.    Resolved with application of pressure by nursing.     Diet: NPO for Medical/Clinical Reasons Except for: Meds    DVT Prophylaxis: Pneumatic Compression Devices and restart lovenox  Cope Catheter: not present  Code Status: Full Code           Disposition Plan   Expected discharge: continue inpatient care  Entered: Varun Edwards MD 01/29/2021, 1:17 PM       The patient's care was discussed with the Bedside Nurse, Care Coordinator/, Patient and Palliative Care Consultant.    Varun Edwards MD  Hospitalist Service  [facility]  Contact information available via [facility] Paging/Directory    ______________________________________________________________________    Interval History   Jah Tate was seen this afternoon. Had increased shortness of breath this morning and he was put back on BiPAP, however he subsequently removed it and refused to wear it. Feels better this afternoon. Feels like his shortness of breath is improving. Continue to have a productive cough with whit phlegm. Denies fevers, chest pain, nausea, abdominal pain. He met with palliative care earlier today, but doesn't really remember the conversation.    Data reviewed today: I reviewed all medications, new labs and imaging results over the last 24 hours. I personally reviewed no images or EKG's today.    Physical Exam   Vital Signs: Temp: 98.8  F (37.1  C) Temp src: Oral BP: 131/71 Pulse: 68   Resp: 16 SpO2: 94 % O2 Device: Oxymizer cannula Oxygen Delivery: 5 LPM  Weight: 164 lbs 1.6 oz  Constitutional: awake, alert, cooperative, laying in bed, frail  Respiratory: coarse, diminished at the bases, appears short of breath when talking  Cardiovascular: regular rate and rhythm, normal S1 and S2, no  murmur noted  GI: normal bowel sounds, soft, non-distended, non-tender  Skin: warm, dry  Musculoskeletal: no lower extremity pitting edema present  Neurologic: awake, alert, answers some questions appropriately but also appears confused at times during the same conversation    Data   Recent Labs   Lab 01/29/21  0601 01/28/21  1129 01/28/21  0800 01/27/21  0613 01/25/21  0948 01/25/21  0948   WBC 11.3*  --  22.3* 12.9*   < > 23.1*   HGB 9.9*  --  11.1* 10.0*   < > 12.5*   MCV 92  --  92 91   < > 88     --  285 188   < > 162   INR  --   --   --   --   --  1.24*    139 141 140   < > 128*   POTASSIUM 4.7 4.4 4.2 4.4   < > 4.4   CHLORIDE 106 105 107 108   < > 90*   CO2 32 28 33* 29   < > 31   BUN 24 19 19 24   < > 17   CR 0.57* 0.54* 0.63* 0.52*   < > 0.66   ANIONGAP 3 6 1* 3   < > 7   SHEEBA 8.3* 8.5 8.5 8.2*   < > 9.1   * 143* 116* 114*   < > 148*   ALBUMIN  --   --   --   --   --  2.6*   PROTTOTAL  --   --  6.3*  --   --  7.4   BILITOTAL  --   --   --   --   --  0.8   ALKPHOS  --   --   --   --   --  83   ALT  --   --   --   --   --  16   AST  --   --   --   --   --  17   TROPI  --   --   --   --   --  0.024    < > = values in this interval not displayed.     Medications     - MEDICATION INSTRUCTIONS -       - MEDICATION INSTRUCTIONS -       - MEDICATION INSTRUCTIONS -         ceFEPIme (MAXIPIME) IV  2 g Intravenous Q8H     fluticasone-vilanterol  1 puff Inhalation Daily     folic acid  1 mg Oral Daily     furosemide  20 mg Intravenous Once     methylPREDNISolone  62.5 mg Intravenous Q12H     metroNIDAZOLE  500 mg Intravenous Q12H     [Held by provider] predniSONE  40 mg Oral Daily     sodium chloride 0.9 %  100 mL Intravenous Once     umeclidinium  1 puff Inhalation Daily

## 2021-01-29 NOTE — PROGRESS NOTES
"Pt. Placed on BIPAP at 10:30am for abdominal muscle use with breathing. O2 stable. Pt. Ripped off BIPAP around 11:00am. Pt. Is adamantly refusing to wear BIPAP despite risk for worsening breathing. Education done and aware or risk for potential intubation if continued CO2 retention. \"Your'e going to have to figure out something else. I won't do it. You are just going to have to figure out something else.\"     Pt. Is mostly calm and cooperative but does become irritable and resistant to assements when using BIPAP due to dislike to the treatment.     Dr. Edwards updated.   Charge nurse updated.   Palliative plans to meet with patient around 11:30am.    "

## 2021-01-29 NOTE — PROGRESS NOTES
"PULMONOLOGY PROGRESS NOTE    Date of Admission: 1/25/2021    CC/Reason for Hospital visit: copd, hypoxia, resp failure  SUBJECTIVE      Worse today, rapid response and bipap yesterday. Thora with 900cc out. Inc o2 needs.      ROS: A Problem Pertinent review of systems was negative except for items noted in HPI.  Past Medical, Family, and Social/Substance History has been reviewed: No interval changes.   OBJECTIVE   Vital signs:  Temp: 98.4  F (36.9  C) Temp src: Axillary BP: 130/80 Pulse: 79   Resp: 16 SpO2: 96 % O2 Device: Oxymask Oxygen Delivery: 3 LPM   Weight: 74.4 kg (164 lb 1.6 oz)  Estimated body mass index is 22.89 kg/m  as calculated from the following:    Height as of 4/6/20: 1.803 m (5' 11\").    Weight as of this encounter: 74.4 kg (164 lb 1.6 oz).        I/O last 3 completed shifts:  In: 6 [I.V.:6]  Out: 685 [Urine:685]      CONSTITUTIONAL/GENERAL APPEARANCE: Alert male, frail, ill appearing  EARS, NOSE,THROAT,MOUTH: External ears and nose overall normal.  NECK: Neck appearance normal.  RESPIRATORY: dec, coarse, tachypnea  CARDIOVASCULAR: S1, S2, regular    LABORATORY ASSESSMENT    Arterial Blood Gas  Recent Labs   Lab 01/28/21  1410 01/28/21  1122 01/25/21  0948   PH 7.33* 7.31*  --    PCO2 62* 61*  --    PO2 128* 94  --    HCO3 33* 31*  --    O2PER 60% 80% 50     CBC  Recent Labs   Lab 01/29/21  0601 01/28/21  0800 01/27/21  0613 01/26/21  0442   WBC 11.3* 22.3* 12.9* 12.9*   RBC 3.49* 3.94* 3.55* 3.73*   HGB 9.9* 11.1* 10.0* 10.6*   HCT 32.0* 36.1* 32.4* 33.3*   MCV 92 92 91 89   MCH 28.4 28.2 28.2 28.4   MCHC 30.9* 30.7* 30.9* 31.8   RDW 15.3* 15.3* 15.4* 15.1*    285 188 151     BMP  Recent Labs   Lab 01/29/21  0601 01/28/21  1129 01/28/21  0800 01/27/21  0613    139 141 140   POTASSIUM 4.7 4.4 4.2 4.4   CHLORIDE 106 105 107 108   SHEEBA 8.3* 8.5 8.5 8.2*   CO2 32 28 33* 29   BUN 24 19 19 24   CR 0.57* 0.54* 0.63* 0.52*   * 143* 116* 114*     INR  Recent Labs   Lab 01/25/21  0948 "   INR 1.24*      BNPNo lab results found in last 7 days.  VENOUS BLOOD GASES  Recent Labs   Lab 01/25/21  0948   PHV 7.31*   PCO2V 65*   PO2V 58*   HCO3V 33*   JUANA 4.3         Additional labs and/or comments:     IMAGING         CT Chest 1/25:  1.  Left lower lobe atelectasis was present on the prior study and is  not significantly changed. Large masslike airspace opacity in the  lateral left upper lobe is new and likely infectious given its rapid  development. The spiculated airspace opacity in the right middle lobe  is also completely new compared to the prior study and more likely to  represent infection. Hilar and mediastinal adenopathy is assumed to be  reactive. The possibility of aggressive malignancy cannot be entirely  excluded. Consider bronchoscopy.  2.  Infrarenal abdominal aortic aneurysm measures 5.8 cm compared to  3.7 cm on 8/2/2013. Vascular surgery referral recommended.        cxr 1/28: Interval increase in density involving the left lung most compatible with increasing pneumonia with interstitial component. More focal dense component along the lateral aspect left perihilar region remain stable. Mild atelectasis medial   aspect right lung base with prominence of the right hilar region related to the focal soft tissue density in this area on the CT. Mild cardiac enlargement. Normal pulmonary vascularity.      PFT & OTHER TESTING       ASSESSMENT / PLAN      Pulmonary diagnoses:  Abnl CT/CXR R91.8  Adenopathy R59.9  COPD exacerb J44.1  Cough R05  Hypoxemia R09.02  Nicotine depend F17.210  Pleural effusion  Pneumonia unspec J18.9  Resp fail acute J96.00  Resp fail chronic J96.10        ASSESSMENT : 76M, very severe o2 dep copd admitted with resp failure and dense bilateral L>R infiltrates. Slowly improving with abx and steroids. ? Aspiration. Too unstable for bronch, does have likely parapneumonic effusion. Given the relatively rapid development of infiltrates, suspect infectious rather than  malignancy but malignancy remains a possibility.        PLAN:     Suggest left sided diagnostic / therapeutic thoracentesis done 1/28. [place], cyto pending.    Wean o2, goal sat ~ 88%, prn NIV    Continue abx    Continue steroids    Continue bronchodilators    Flutter valve and IS    Follow Sputum/blood cultures    Too unstable for bronch at this time    Anticipate extended course of abx. Will need outpt imaging in 8 weeks    Agree with ongoing goals of care discussion. Does have very severe end-stage COPD    Will follow      Varun Santana  [facility]  Office: [phone]  Pager: [phone]

## 2021-01-29 NOTE — PLAN OF CARE
OT: spoke with Nursing this am, pt off bipap on oxymask, will hold therapy today for medical mgmt with respiratory status. Noted palliative consulted today.

## 2021-01-29 NOTE — PROGRESS NOTES
[facility]    Infectious Disease Progress Note    Date of Service (when I saw the patient): 01/29/2021     Assessment & Plan   Jah Tate is a 76 year old male who was admitted on 1/25/2021.     Impression:  1. 76 y.o male with end stage COPD, on 5 L of oxygen at home.   2. Current every day smoker.   3. Presenting with 1 week history of shortness of breath, patient had received Covid vaccine in the last week.  4. CT of the chest shows left lower lobe atelectatic cyst and a larger masslike airspace opacity in the lateral left upper lobe, which is new, there is also a spiculated airspace opacity in the right middle lobe.  There is associated hilar and mediastinal adenopathy.  5. Absolutely no risk factors for TB, never travelled out of [place] per patient, no dwelling in any shelter, jailhouse or group home. Quantiferon TB is negative, out of iso now.   7. PCN listed as allergy.        Recommendations:  Continue on cefepime and flagyl, GNR noted on the sputum will follow   MRSA PCR neg, vanco stopped   Leucocytosis on steroids.   Goal of care discussion noted       Anna Estrada MD    Interval History   No fever   Quant tb is neg   Slightly more short of breath today   RRT again with hypoxia     Physical Exam   Temp: 98.4  F (36.9  C) Temp src: Axillary BP: 130/80 Pulse: 79   Resp: 16 SpO2: 96 % O2 Device: Oxymask Oxygen Delivery: 3 LPM  Vitals:    01/26/21 0353 01/27/21 0400 01/29/21 0651   Weight: 73 kg (160 lb 15 oz) 75.5 kg (166 lb 7.2 oz) 74.4 kg (164 lb 1.6 oz)     Vital Signs with Ranges  Temp:  [97.7  F (36.5  C)-98.6  F (37  C)] 98.4  F (36.9  C)  Pulse:  [] 79  Resp:  [16-28] 16  BP: ()/(44-88) 130/80  FiO2 (%):  [50 %-100 %] 50 %  SpO2:  [85 %-100 %] 96 %    Constitutional: Awake,  Lungs: using accessory muscles, coarse   Cardiovascular: Regular rate and rhythm, normal S1 and S2, and no murmur noted  Abdomen: Normal bowel sounds, soft, non-distended, non-tender  Skin:  No rashes, no cyanosis, no edema  Other:    Medications     - MEDICATION INSTRUCTIONS -       - MEDICATION INSTRUCTIONS -       - MEDICATION INSTRUCTIONS -         ceFEPIme (MAXIPIME) IV  2 g Intravenous Q8H     fluticasone-vilanterol  1 puff Inhalation Daily     folic acid  1 mg Oral Daily     methylPREDNISolone  62.5 mg Intravenous Q12H     metroNIDAZOLE  500 mg Intravenous Q12H     [Held by provider] predniSONE  40 mg Oral Daily     sodium chloride 0.9 %  100 mL Intravenous Once     umeclidinium  1 puff Inhalation Daily       Data   All microbiology laboratory data reviewed.  Recent Labs   Lab Test 01/29/21  0601 01/28/21  0800 01/27/21  0613   WBC 11.3* 22.3* 12.9*   HGB 9.9* 11.1* 10.0*   HCT 32.0* 36.1* 32.4*   MCV 92 92 91    285 188     Recent Labs   Lab Test 01/29/21  0601 01/28/21  1129 01/28/21  0800   CR 0.57* 0.54* 0.63*     No lab results found.  Recent Labs   Lab Test 01/26/21  2130 01/26/21  1210 01/26/21  0442 01/25/21  1016 06/25/20  2000 04/06/20  1748   CULT Light growth  Normal oma    Light growth  Non lactose fermenting gram negative rods  *  Culture in progress Canceled, Test credited  >10 Squamous epithelial cells/low power field indicates oral contamination. Please   recollect.  *  Notification of test cancellation was given to  JUAN ALBERTO COON RN 01/26/21 [address] [place] [address].   No growth after 3 days No growth after 4 days Heavy growth  Normal oma   No growth  No growth       Attestation:  Total time on the floor involved in the patient's care: 35 minutes. Total time spent in counseling/care coordination: >50%

## 2021-01-29 NOTE — PLAN OF CARE
"Vitals: WNL except respiratory status. See below.   Lungs: clear/fine crackles on left. Diminished and coarse on the right. Thick, creamy yellow/white sputum. Using yanker to suction out of mouth.   4L on Oxymask. BiPAP x1 hour for breathing with abdominal muscle use, O2 stable. Pt. Ripped off refusing to use. No labored breathing since 10:30 but pt. Quickly becomes SOB with lying flat for repositioning with cares, air hunger, and purple face with tachypnea stating \"I can't breath.\"   MD paged and x 1 IV lasix - results pending.   During event writer offered BIPAP machine. Pt. Said \"I'll try anything. I haven't tried that yet.\" Once shown the mask pt. Immediately refused, \"No, I won't wear that thing.\"   Air hunger resolved within a couple min, still gave IV lasix.   CV:  NSR  GI: Abdomen slightly rounded and soft. Unknown last BM. Provided Senna-S with x1 medium brown stool.   Pt. Has been NPO yesterday and today. Providing ice chips and oral cares.   Uro: Incontinent of urine. Reapplied external burnham around 15:30. Not stocked on unit. No urinary retention.   CMS: WNL  Neuro: no deficits noted except cognition - see psych note below.   Skin: Purple blanchable redness to coccyx. Mepilex and frequent repositioning. Dry. Ecchymotic.   MSK: Bedrest during shift.   Pain: denies   Labs: WBC 11.3 - improved.  Hgb 9.9 - recheck in AM per MD.   Tests/Procedures: echo done- abnormal - paged MD results were back. No new orders.   Drains/lines: left arm peripheral IV. External male burnham.   Psych: Pt. Mood and memory are labile. Pt. Has been laughing, calm and cooperative, tearful and anxious, irritable and refusing cares.   Pt. At times knows he has PNA and is in a hospital and stated who the president was. Most of the time pt. Does not remember why he is here, what  is being done, year, season, or who the president is.   Pt. Does not appear to have good insight into situation.   Writer unsure if pt. Is capable of making his " own decisions  due to increasing and labile mentation.

## 2021-01-29 NOTE — CONSULTS
[facility]  Palliative Care Consultation Note    Patient: Jah Tate  Date of Admission:  1/25/2021    Requesting Clinician / Team: Dr. Edwards/Hospitalist   Reason for consult: Goals of care    Recommendations:    Goals of care very clearly restorative     Pt is hoping to avoid bipap if he can, but does express willingness to wear it again if he needs it. RN had mentioned that he was pulling it off earlier and stating he will not wear it anymore. Bipap compliance is questionable moving forward    Reviewed code status in depth, particularly intubation. Pt very clearly wants to be a full code. This is consistent from his POLST 9/2020 and what he has told his daughter. He would never want to be intubated long term, or be trached. He voices understanding that ability to get off the ventilator is very unlikely, and family will likely be faced with needing to compassionately step away from the ventilator to allow the natural dying process    I reviewed all of this with pt's daughter, Shraddha, who is the main point of contact/support for pt (wife Archana is also supportive, but she has chronic health issues; I actually met her in 12/2020, and dtr will help with informing wife about plan of care)    I will plan to follow along, and plan to check in on Monday     These recommendations have been discussed with bedside KYLER Mendoza and Dr. Edwards.    JULIA Gutierrez CNP  [facility]  Contact information available via [facility] Paging/Directory      Thank you for the opportunity to participate in the care of this patient and family. Our team: will continue to follow.     During regular M-F work hours ([phone]) -- if you are not sure who specifically to contact -- please contact us on [facility] Smart Web.     After regular work hours and on weekends/holidays, you can call our answering service at [phone].     Attestation:  Total time on the floor involved in the patient's  care: 70 minutes  Total time spent in counseling/care coordination: >50%    Assessments:  Jah Tate is a 76 year old male with PMH significant for severe end stage COPD on chronic 5L O2 at home, AAA, gout, and mild-moderate dementia who presents with acute on chronic respiratory failure 2/2 bilateral PNA. Pulmonary and ID following. Suspect infectious etiology, on broad spectrum abx. Cavitary lesions possibly could represent malignancy. S/p left thoracentesis 1/28 with 900cc removed. Yesterday, pt developed worsening respiratory status requiring increasing O2 support and bipap. He has since been able to wean his O2 support, but seems to continue to be tenuous.     Today, the patient was seen for:  Goals of care    Visited Jah this AM. He just had his echo and got off bipap support. Apparently per Kelly CHÁVEZ, pt was pulling off the bipap mask and stating he will never wear it again.     Jah initially did not have much understanding as to why he is in the hospital. I filled him in on his health concerns, including very serious COPD (O2 dependent) and now a serious PNA. He is able to track this conversation.     Jah very clearly states he doesn't feel good, or right. He can't quite put his finger on it, but something just isn't right, and he has never felt this way before. We acknowledge the seriousness of his condition, given the tenuous nature of his breathing since being hospitalized, and the varying levels of O2 support he has required.    Jah and I spend more time talking about bipap. Although he doesn't like it, he is willing to put it on again if he needs it.     We also talk about intubation. I educated regarding the pros and cons of intubation and mechanical ventilation. Discussed probability of survival as well as quality of life implications. Recommended DNI. Jah very clearly states he wants the chance at intubation. He does not want to be vented long term, or trached. He and I  acknowledge that he may not ever be able to breathe again without that support. He voices understanding that his family may very well be in a position where they would need to make the decision about stepping away from the ventilator and allowing and preparing for the dying process. He accepts this.     We discuss the scenario where his heart gets into trouble. I educated regarding the pros and cons of attempting cardiac resuscitation. Discussed probability of survival as well as quality of life implications. Recommended DNR. Despite very low likelihood of surviving an event such as this, Jah states he wants CPR and resuscitative efforts.     I express worry that future treatments may inflict harm, discomfort, and suffering with very little meaningful outcome. Jah accepts this. If at any point he does not want something done to his body, I encourage him to let us know. He is in agreement.     Jah was ok with me updating his dtr Shraddha, who is his main point person. Wife Archana is involved, but has chronic health issues and hearing deficit, making communication challenging.     Spoke with dtr Shraddha via phone. Introduced myself and our services. I actually met Padmini and pt's wife Archana during a hospitalization in 12/2020. Wife now follows with my colleague, Dr. Raphael, in the clinic.    Padmini and I acknowledge pt's underlying COPD, which appears end stage, and now his serious PNA. She and I discuss the medical team's concern for pt's tenuous breathing, and how this may end up reflecting Jah's dying process.     I detailed the above conversation to Padmini. She knows Jah's wishes to be consistent. She and I discuss the careful balance of comfort medications and their incongruence with restorative measures when the body is trying to do the work of dying.     Padmini is very supportive of pt's wishes, and willing and understanding of the possibility that she and wife Archana may end up needing to serve as surrogate  decision makers if pt's health continues to deteriorate.     We talk about worst case scenario (pt suffers and dies from this) vs best (he survives, but still has end stage COPD, will have further functional decline, and may never be able to safely live at home again).     Prognosis, Goals, & Planning:      Functional Status just prior to hospitalization: 3 (Capable of only limited self-care; needs help with ADLs; in bed/chair >50% of waking hours)      Prognosis, Goals, and/or Advance Care Planning were addressed today: Yes      Patient's decision making preferences: independently          Patient has decision-making capacity today for complex decisions: Yes            I have concerns about the patient/family's health literacy today: No           Patient has a completed Health Care Directive: Yes, and on file. POLST      Code status: Full Code. Discussed at length, DNR/DNI recommended.     Coping, Meaning, & Spirituality:   Mood, coping, and/or meaning in the context of serious illness were addressed today: Yes  Summary/Comments:  Pt acknowledges that he doesn't feel well and something just isn't right. He can't put his finger on it, but he has never felt this way before. Pt denies shalonda or spirituality as being important to him.     Social:     Living situation: With wife Archana in [facility] in [place], [place]    [place] family / caregivers: Wife Archana is very chronically ill (many hospitalizations 2020). Dtr Padmini very supportive and involved. Son Estiven and brother live local     Occupational history:      History of Present Illness:  History gathered today from: patient, family/loved ones, medical chart, medical team members, unit team members, health care directive/s    Jah Tate is a 76 year old male with PMH significant for severe end stage COPD on chronic 5L O2 at home, AAA, gout, and dementia who presents with acute on chronic respiratory failure 2/2 bilateral PNA. Pulmonary and ID  following. Suspect infectious etiology, on broad spectrum abx. Cavitary lesions possibly could represent malignancy. S/p left thoracentesis 1/28 with 900cc removed. Yesterday, pt developed worsening respiratory status requiring increasing O2 support and bipap. He has since been able to wean his O2 support, but seems to continue to be tenuous.     Key Palliative Symptom Data:  # Pain severity the last 12 hours: low  # Dyspnea severity the last 12 hours: severe  # Nausea severity the last 12 hours: none  # Anxiety severity the last 12 hours: severe    Patient is on opioids: bowels not assessed today.    ROS:  Comprehensive ROS is reviewed and is negative except as here & per HPI: N/A     Past Medical History:  Past Medical History:   Diagnosis Date     Bilateral lower extremity edema      COPD (chronic obstructive pulmonary disease) (H)      Dementia (H)      Gout         Past Surgical History:  Past Surgical History:   Procedure Laterality Date     FRACTURE TX, ANKLE RT/LT      left- w/plate         Family History:  No family history on file.      Allergies:  Allergies   Allergen Reactions     Penicillins Swelling     Has tolerated Keflex, cefuroxime     Sulfa Drugs Swelling     Chantix [Varenicline Tartrate]      Hallucinations          Medications:  I have reviewed this patient's medication profile and medications from this hospitalization.   Noted scheduled meds are:  Cefepime   Methylprednisolone   Flagyl     Noted PRN meds are:  Morphine 1mg IV Q2hrs PRN pain   Oxycodone 5-10mg PO Q3hrs PRN pain   Seroquel 25mg PO TID PRN agitation     Physical Exam:  Vital Signs: Temp: 98.4  F (36.9  C) Temp src: Axillary BP: 133/75 Pulse: 79   Resp: 16 SpO2: 93 % O2 Device: BiPAP/CPAP Oxygen Delivery: 3 LPM  Weight: 164 lbs 1.6 oz  CONSTITUTIONAL: Chronically ill man seen lying in bed in NAD, A&Ox3. Calm and cooperative.  HEENT: NCAT  RESPIRATORY: NL respiratory effort on oxymizer   NEUROLOGIC: Appropriately responsive during  interview  PSYCH: Affect engaged     Data reviewed:  Recent imaging reviewed, my comments on pertinents:   Results for orders placed or performed during the hospital encounter of 01/25/21   XR Chest Port 1 View    Impression    IMPRESSION: There is masslike abnormal airspace opacity in the mid  left lung that is new compared to the prior study and associated with  a small left pleural effusion. There is also a trace right pleural  effusion. Findings are suggestive of pneumonia with parapneumonic  effusion. Continued radiographic follow-up until complete resolution  is recommended.    ORLANDO KRUSE MD   CT Chest Pulmonary Embolism w Contrast    Impression    IMPRESSION:  1.  Left lower lobe atelectasis was present on the prior study and is  not significantly changed. Large masslike airspace opacity in the  lateral left upper lobe is new and likely infectious given its rapid  development. The spiculated airspace opacity in the right middle lobe  is also completely new compared to the prior study and more likely to  represent infection. Hilar and mediastinal adenopathy is assumed to be  reactive. The possibility of aggressive malignancy cannot be entirely  excluded. Consider bronchoscopy.  2.  Infrarenal abdominal aortic aneurysm measures 5.8 cm compared to  3.7 cm on 8/2/2013. Vascular surgery referral recommended.    ORLANDO KRUSE MD   CTA Abdomen Pelvis with Contrast    Impression    IMPRESSION:  1. Aneurysmal dilatation of the abdominal aorta measuring up to 5.4 x  6.0 cm, previously 3.7 cm.  2. Moderate left pleural effusion with associated atelectasis.  3. Diverticulosis without evidence of diverticulitis.    LINDA FIGUEROA,    XR Chest Port 1 View    Impression    IMPRESSION: Interval increase in density involving the left lung most compatible with increasing pneumonia with interstitial component. More focal dense component along the lateral aspect left perihilar region remain stable. Mild atelectasis  medial   aspect right lung base with prominence of the right hilar region related to the focal soft tissue density in this area on the CT. Mild cardiac enlargement. Normal pulmonary vascularity.   US Thoracentesis    Impression    IMPRESSION: Technically successful thoracentesis without immediate  complications.   US Lower Extremity Venous Duplex Bilateral    Impression    IMPRESSION: Negative for deep venous thrombosis in both lower  extremities.    DADA GARCIA MD       Recent lab data reviewed, my comments on pertinents:   Na 141  K 4.7  Creat 0.57  WBC 11.3  Hgb 9.9  Plt 238  Albumin 2.6  INR 1.24

## 2021-01-29 NOTE — PLAN OF CARE
Alert to self, disoriented to place, time and situation. On BIPAP overnight, switched to oxymask 15 L, productive cough, ALONSO,VSS, denies pain. External catheter in place, minimal void overnight. Tele-SR, on IV maxipime and flagyl. LS course

## 2021-01-30 ENCOUNTER — APPOINTMENT (OUTPATIENT)
Dept: GENERAL RADIOLOGY | Facility: CLINIC | Age: 76
DRG: 193 | End: 2021-01-30
Attending: NURSE PRACTITIONER
Payer: COMMERCIAL

## 2021-01-30 LAB
ANION GAP SERPL CALCULATED.3IONS-SCNC: 3 MMOL/L (ref 3–14)
BACTERIA SPEC CULT: ABNORMAL
BUN SERPL-MCNC: 29 MG/DL (ref 7–30)
C DIFF TOX B STL QL: NEGATIVE
CALCIUM SERPL-MCNC: 8.4 MG/DL (ref 8.5–10.1)
CHLORIDE SERPL-SCNC: 100 MMOL/L (ref 94–109)
CO2 SERPL-SCNC: 35 MMOL/L (ref 20–32)
CREAT SERPL-MCNC: 0.66 MG/DL (ref 0.66–1.25)
ERYTHROCYTE [DISTWIDTH] IN BLOOD BY AUTOMATED COUNT: 15.1 % (ref 10–15)
GFR SERPL CREATININE-BSD FRML MDRD: >90 ML/MIN/{1.73_M2}
GLUCOSE SERPL-MCNC: 141 MG/DL (ref 70–99)
HCT VFR BLD AUTO: 30.6 % (ref 40–53)
HGB BLD-MCNC: 9.9 G/DL (ref 13.3–17.7)
MAGNESIUM SERPL-MCNC: 2.2 MG/DL (ref 1.6–2.3)
MCH RBC QN AUTO: 29.1 PG (ref 26.5–33)
MCHC RBC AUTO-ENTMCNC: 32.4 G/DL (ref 31.5–36.5)
MCV RBC AUTO: 90 FL (ref 78–100)
PLATELET # BLD AUTO: 282 10E9/L (ref 150–450)
POTASSIUM SERPL-SCNC: 4.1 MMOL/L (ref 3.4–5.3)
RBC # BLD AUTO: 3.4 10E12/L (ref 4.4–5.9)
SODIUM SERPL-SCNC: 138 MMOL/L (ref 133–144)
SPECIMEN SOURCE: ABNORMAL
SPECIMEN SOURCE: NORMAL
WBC # BLD AUTO: 9.8 10E9/L (ref 4–11)

## 2021-01-30 PROCEDURE — 999N000157 HC STATISTIC RCP TIME EA 10 MIN

## 2021-01-30 PROCEDURE — 210N000002 HC R&B HEART CARE

## 2021-01-30 PROCEDURE — 250N000009 HC RX 250: Performed by: NURSE PRACTITIONER

## 2021-01-30 PROCEDURE — 80048 BASIC METABOLIC PNL TOTAL CA: CPT | Performed by: HOSPITALIST

## 2021-01-30 PROCEDURE — 94667 MNPJ CHEST WALL 1ST: CPT

## 2021-01-30 PROCEDURE — 250N000013 HC RX MED GY IP 250 OP 250 PS 637: Performed by: HOSPITALIST

## 2021-01-30 PROCEDURE — 71045 X-RAY EXAM CHEST 1 VIEW: CPT

## 2021-01-30 PROCEDURE — 999N000128 HC STATISTIC PERIPHERAL IV START W/O US GUIDANCE

## 2021-01-30 PROCEDURE — 99291 CRITICAL CARE FIRST HOUR: CPT | Performed by: NURSE PRACTITIONER

## 2021-01-30 PROCEDURE — 85027 COMPLETE CBC AUTOMATED: CPT | Performed by: HOSPITALIST

## 2021-01-30 PROCEDURE — 250N000013 HC RX MED GY IP 250 OP 250 PS 637: Performed by: NURSE PRACTITIONER

## 2021-01-30 PROCEDURE — 250N000012 HC RX MED GY IP 250 OP 636 PS 637: Performed by: INTERNAL MEDICINE

## 2021-01-30 PROCEDURE — 94640 AIRWAY INHALATION TREATMENT: CPT | Mod: 76

## 2021-01-30 PROCEDURE — 94640 AIRWAY INHALATION TREATMENT: CPT

## 2021-01-30 PROCEDURE — 83735 ASSAY OF MAGNESIUM: CPT | Performed by: HOSPITALIST

## 2021-01-30 PROCEDURE — 250N000011 HC RX IP 250 OP 636: Performed by: NURSE PRACTITIONER

## 2021-01-30 PROCEDURE — 250N000011 HC RX IP 250 OP 636: Performed by: HOSPITALIST

## 2021-01-30 PROCEDURE — 87493 C DIFF AMPLIFIED PROBE: CPT | Performed by: INTERNAL MEDICINE

## 2021-01-30 PROCEDURE — 36415 COLL VENOUS BLD VENIPUNCTURE: CPT | Performed by: HOSPITALIST

## 2021-01-30 RX ORDER — GUAIFENESIN 600 MG/1
600 TABLET, EXTENDED RELEASE ORAL 2 TIMES DAILY
Status: DISCONTINUED | OUTPATIENT
Start: 2021-01-30 | End: 2021-02-05 | Stop reason: HOSPADM

## 2021-01-30 RX ORDER — IPRATROPIUM BROMIDE AND ALBUTEROL SULFATE 2.5; .5 MG/3ML; MG/3ML
3 SOLUTION RESPIRATORY (INHALATION)
Status: DISCONTINUED | OUTPATIENT
Start: 2021-01-30 | End: 2021-02-02

## 2021-01-30 RX ORDER — NITROGLYCERIN 0.4 MG/1
0.4 TABLET SUBLINGUAL EVERY 5 MIN PRN
Status: DISCONTINUED | OUTPATIENT
Start: 2021-01-30 | End: 2021-02-01

## 2021-01-30 RX ORDER — ACETYLCYSTEINE 200 MG/ML
2 SOLUTION ORAL; RESPIRATORY (INHALATION) 4 TIMES DAILY
Status: DISCONTINUED | OUTPATIENT
Start: 2021-01-30 | End: 2021-02-02

## 2021-01-30 RX ORDER — LIDOCAINE 40 MG/G
CREAM TOPICAL
Status: DISCONTINUED | OUTPATIENT
Start: 2021-01-30 | End: 2021-02-01

## 2021-01-30 RX ORDER — PREDNISONE 20 MG/1
60 TABLET ORAL DAILY
Status: DISCONTINUED | OUTPATIENT
Start: 2021-01-30 | End: 2021-02-05 | Stop reason: HOSPADM

## 2021-01-30 RX ADMIN — Medication 1 MG: at 21:29

## 2021-01-30 RX ADMIN — FLUTICASONE FUROATE AND VILANTEROL TRIFENATATE 1 PUFF: 200; 25 POWDER RESPIRATORY (INHALATION) at 08:57

## 2021-01-30 RX ADMIN — METHYLPREDNISOLONE SODIUM SUCCINATE 62.5 MG: 125 INJECTION, POWDER, FOR SOLUTION INTRAMUSCULAR; INTRAVENOUS at 00:11

## 2021-01-30 RX ADMIN — METRONIDAZOLE 500 MG: 500 INJECTION, SOLUTION INTRAVENOUS at 01:25

## 2021-01-30 RX ADMIN — CEFEPIME HYDROCHLORIDE 2 G: 2 INJECTION, POWDER, FOR SOLUTION INTRAVENOUS at 18:40

## 2021-01-30 RX ADMIN — CEFEPIME HYDROCHLORIDE 2 G: 2 INJECTION, POWDER, FOR SOLUTION INTRAVENOUS at 00:11

## 2021-01-30 RX ADMIN — ACETYLCYSTEINE 2 ML: 200 SOLUTION ORAL; RESPIRATORY (INHALATION) at 15:38

## 2021-01-30 RX ADMIN — METHYLPREDNISOLONE SODIUM SUCCINATE 62.5 MG: 125 INJECTION, POWDER, FOR SOLUTION INTRAMUSCULAR; INTRAVENOUS at 12:14

## 2021-01-30 RX ADMIN — GUAIFENESIN 600 MG: 600 TABLET, EXTENDED RELEASE ORAL at 21:29

## 2021-01-30 RX ADMIN — UMECLIDINIUM 1 PUFF: 62.5 AEROSOL, POWDER ORAL at 08:57

## 2021-01-30 RX ADMIN — IPRATROPIUM BROMIDE AND ALBUTEROL SULFATE 3 ML: .5; 3 SOLUTION RESPIRATORY (INHALATION) at 15:38

## 2021-01-30 RX ADMIN — ENOXAPARIN SODIUM 40 MG: 40 INJECTION SUBCUTANEOUS at 18:39

## 2021-01-30 RX ADMIN — FOLIC ACID 1 MG: 1 TABLET ORAL at 08:57

## 2021-01-30 RX ADMIN — IPRATROPIUM BROMIDE AND ALBUTEROL SULFATE 3 ML: .5; 3 SOLUTION RESPIRATORY (INHALATION) at 20:24

## 2021-01-30 RX ADMIN — METRONIDAZOLE 500 MG: 500 INJECTION, SOLUTION INTRAVENOUS at 14:22

## 2021-01-30 RX ADMIN — ACETYLCYSTEINE 2 ML: 200 SOLUTION ORAL; RESPIRATORY (INHALATION) at 20:24

## 2021-01-30 RX ADMIN — GUAIFENESIN 600 MG: 600 TABLET, EXTENDED RELEASE ORAL at 14:23

## 2021-01-30 RX ADMIN — PREDNISONE 60 MG: 20 TABLET ORAL at 14:23

## 2021-01-30 RX ADMIN — CEFEPIME HYDROCHLORIDE 2 G: 2 INJECTION, POWDER, FOR SOLUTION INTRAVENOUS at 09:27

## 2021-01-30 ASSESSMENT — ACTIVITIES OF DAILY LIVING (ADL)
ADLS_ACUITY_SCORE: 23
ADLS_ACUITY_SCORE: 23
ADLS_ACUITY_SCORE: 19
ADLS_ACUITY_SCORE: 23

## 2021-01-30 NOTE — PROVIDER NOTIFICATION
MD Notification    Notified Person: MD    Notified Person Name:  Joshua     Notification Date/Time: 1/30 1530    Notification Interaction: text page     Purpose of Notification: pt experiencing abdominal cramping and has had x3 loose stools. Want to sample stool? Thanks     Orders Received: No call back, re-paged at 1650   1700: Order for stool sample and enteric precautions.

## 2021-01-30 NOTE — PROGRESS NOTES
RRT called at 1400. Writer not beside when respiratory event started, but was called bedside by NA. Upon arrival, pt tripoding and very dusky. RR appeared 30-40's & O2 was 74% on 4L NC. Oximask placed on patient at 15L and O2 gradually trending back to 90's. Due to sudden onset of symptoms and pt tenuous respiratory status, RRT was called.

## 2021-01-30 NOTE — PLAN OF CARE
OT - Attempted to see pt for scheduled evaluation, however, even with extra time spent reassuring pt and educating him, he was too anxious to attempt any movement, even to trial sitting EOB. Pt's goals of care are restorative, but pt is too anxious to participate in skilled OT intervention at this time. OT has attempted to evaluate pt X4, and will complete order at this time. MD please re-order if pt more appropriate. Skilled OT intervention can also be initiated at TCU if pt continues to recover and is able to discharge to TCU.

## 2021-01-30 NOTE — CODE/RAPID RESPONSE
"[facility]    RRT Note  1/30/2021   Time Called: 1: 53 PM    RRT called for: shortness of breath     Assessment & Plan   Acute on chronic hypoxic respiratory failure, worsening, multifactorial   RRT called for sudden onset shortness of breath which largely resolved after clearing some secretions. Upon my arrival patient talking in full sentences, giving relatively accurate history. He continues to cough weakly and is bringing up very thick yellow secretions. I suspect he cleared a mucous plug to account for improvement without other intervention.     Per EHR review he remains a full code and doesn't seem to understand goals of care discussion when I approached the subject. I called his daughter to discuss plan and she continues to feel like her dad would want trial of intubation, but would not want long-term intubation and mechanical ventilation.     Discussed with Dr. Liu, ICU attending. He is in agreement with my current plan outside the ICU. If Mr. Tate worsens will need to consider transfer to ICU for intubation and bronchoscopy.     INTERVENTIONS:  - IMC as he will need heavy nursing care and RT care to keep secretions clearing and avoid transfer to ICU   - intermittent BiPap is okay, will attempt to keep secretions clear   - chest physiotherapy with Mucomyst and DuoNeb four times daily   - scheduled Mucinex   - chest Xray with infiltrates appearing to be improved, no significant pleural effusion     Discussed with and defer further cares to Dr. Lewis, Hospitalist.     Code Status: Full Code. Discussed with patient and he does not seems to understand the subject at this time. He tells me \"sometimes it's hard to breath and sometimes it's fine with watching TV.\" I discussed via phone with daughter, Padmini. We discussed my concern that this infection may not be survivable for her dad. We discussed his severe deconditioning limits clearing of secretions. I shared with Padmini that " intubation and mechanical ventilation is not likely to improve his outcome and may lead to family needing to make the decision to withdraw support. We discussed the restorative and comfort care pathways and at this time Padmini would like to continue with restorative care.     JULIA Rodriguez, CNP  Hospitalist Service, House Officer  [facility]     Text Page  Pager: [phone]    Allergies   Allergies   Allergen Reactions     Penicillins Swelling     Has tolerated Keflex, cefuroxime     Sulfa Drugs Swelling     Chantix [Varenicline Tartrate]      Hallucinations         Physical Exam   Vital Signs with Ranges:  Temp:  [98  F (36.7  C)] 98  F (36.7  C)  Pulse:  [] 128  Resp:  [16] 16  BP: (121-154)/(63-99) 145/73  SpO2:  [95 %-98 %] 98 %  I/O last 3 completed shifts:  In: -   Out: 1400 [Urine:1400]    Constitutional: 76- year old chronically ill appearing male sitting in bed.   Pulmonary: No significant respiratory distress. Lung fields coarse. He is hypoxic with worsening hypoxia.   Cardiovascular: S1, S2 without obvious murmur, rub, or gallop. He appears adequately perfused.   GI: Soft, non-tender.   Skin/Integumen: No obvious concerning rashes or lesions on exposed skin.   Neuro: Awake, alert, oriented x 3, non-focal.   Psych:  Anxious.   Extremities: Moves all extremities.       ABG:  -  Recent Labs   Lab 01/28/21  1410   PH 7.33*   PCO2 62*   PO2 128*   HCO3 33*   O2PER 60%       Troponin:    Recent Labs   Lab Test 01/25/21  0948   TROPI 0.024     CBC with Diff:  Recent Labs   Lab Test 01/30/21  0551 01/25/21  0948 01/25/21  0948   WBC 9.8   < > 23.1*   HGB 9.9*   < > 12.5*   MCV 90   < > 88      < > 162   INR  --   --  1.24*    < > = values in this interval not displayed.        Lactic Acid:    Lab Results   Component Value Date    LACT 0.6 01/28/2021           Comprehensive Metabolic Panel:  Recent Labs   Lab 01/30/21  0551 01/28/21  1129 01/28/21  1129 01/28/21  0800  01/25/21 0948 01/25/21 0948      < > 139 141   < > 128*   POTASSIUM 4.1   < > 4.4 4.2   < > 4.4   CHLORIDE 100   < > 105 107   < > 90*   CO2 35*   < > 28 33*   < > 31   ANIONGAP 3   < > 6 1*   < > 7   *   < > 143* 116*   < > 148*   BUN 29   < > 19 19   < > 17   CR 0.66   < > 0.54* 0.63*   < > 0.66   GFRESTIMATED >90   < > >90 >90   < > >90   GFRESTBLACK >90   < > >90 >90   < > >90   SHEEBA 8.4*   < > 8.5 8.5   < > 9.1   MAG 2.2   < >  --   --    < > 2.1   PHOS  --   --  2.4*  --   --   --    PROTTOTAL  --   --   --  6.3*  --  7.4   ALBUMIN  --   --   --   --   --  2.6*   BILITOTAL  --   --   --   --   --  0.8   ALKPHOS  --   --   --   --   --  83   AST  --   --   --   --   --  17   ALT  --   --   --   --   --  16    < > = values in this interval not displayed.       INR:    Recent Labs   Lab Test 01/25/21 0948   INR 1.24*       Time Spent on this Encounter   I spent 60 minutes of critical care time on the unit/floor managing the care of Jah Tate. Upon evaluation, this patient had a high probability of imminent or life-threatening deterioration due to worsening hypoxic respiratory failure, which required my direct attention, intervention, and personal management. 100% of my time was spent at the bedside counseling the patient and/or coordinating care regarding services listed in this note.

## 2021-01-30 NOTE — PLAN OF CARE
Pt VSS on 4L NC. Tele SR. Alert and oriented to self and somewhat place. T/R in bed, using purewick. Denies pain. IMC orders discontinued. Plan to continue abxs and steroids. Continue to monitor.

## 2021-01-30 NOTE — PROGRESS NOTES
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  CT of the chest indicates cavitary lesion of the lungs.    Pneumonia of both lungs due to infectious organism, unspecified part of lung  Cavitary pneumonia  Masslike airspace opacity with concern for possible malignancy  End-stage COPD (FEV1 27%)  Left pleural effusion  Acute on chronic hypoxic respiratory failure    Patient is intermittently on 5 L of oxygen at home chronically, he continues to smoke half pack per day, currently presenting with 1 week history of shortness of breath, patient had received the COVID vaccine in the last week.    CT of the chest showed left lower lobe atelectatic cyst and a larger masslike airspace opacity in the lateral left upper lobe, which is new, there is also a spiculated airspace opacity in the right middle lobe.  There is associated hilar and mediastinal adenopathy.    Patient has history of chronic cough, currently he has a cavitary lesion in the left side of the lungs, the differential diagnosis includes cavitary pneumonia versus malignancy, tuberculosis was also a differential.Initially was placed on BiPAP in the ER and admitted to the ICU, subsequently weaned down to 5 lpm of supplemental oxygen by nasal cannula and transferred out of the ICU on 1/27/21.    QuantiFERON gold negative.Influenza and COVID-19 PCR negative on 1/25/21.    Infectious disease and pulmonology consulted, appreciate their assistance.    Continue cefepime and flagyl per ID. Vancomycin discontinued as nasal MRSA screen was negative.    Continue PTA Breo Ellipta and Incruse Ellipta. PRN albuterol available.  As needed Mucinex ordered.Continue steroids, switch to p.o. prednisone 60 mg,  1/30    Respiratory status deteriorated on 1/28/21. RRT called, see notes for details. Improved after he was placed on BiPAP. [place] [facility] [place]  Patient is currently off of  BiPAP and is on nasal cannula O2, still continues to need 5 L of oxygen.  Plan to wean for SPO2 88% .    S/p thoracentesis on 1/28/21 with 900 ml of fluid removed. Culture negative to date. Cytology pending.  Since patient is close to his baseline O2 needs, need to discuss discharge plans, possibly will need antibiotics for longer term IV, cultures growing Stenotrophomonas maltophilia, colonization versus infection.  White count is currently back to normal.    Patient can be transferred to medical floor on telemetry.    Goals of care    Very severe COPD (FEV1 27%) requiring 4-5 lpm of supplemental oxygen at baseline. Now admitted with severe pneumonia and possible underlying malignancy. Concerned about prognosis going forward. Also concerned that if he were to get to the point of requiring intubation, the likelihood of good outcome would be quite poor.    Discussed with patient and his daughter. He currently wants 'everything done' although given his acute illness I am not sure that he fully understands the conversation. His daughter feels that he would want everything done initially, but would not want to be kept alive on a ventilator for a prolonged period of time.    Palliative care consulted, appreciate their assistance, see notes for details.    Patient remains full code, but states he wouldn't want to be kept alive on machines for a prolonged period of time.    Abdominal aortic aneurysm    CT study on 1/25/21 showed an abdominal aortic aneurysm measuring 5.8 cm compared to 3.57 cm from 8/2/2013.    Vascular surgery consulted, appreciate their assistance.    No acute intervention recommended at this time due to his co-morbidities. Likely plan for outpatient follow-up when/if he recovers from his pulmonary issues.    Chronic gout of  multiple sites    Currently asymptomatic.    Moderate Dementia     Answers orientation questions appropriately, but seems forgetful at times.    Daughter confirms diagnosis of dementia and states he is forgetful but has been able to live independently with his wife.    Plan of care has been discussed with patient and nursing.    Bleeding from scrotum    Pinpoint area of bleeding on left scrotum on 1/26/21.    Resolved with application of pressure by nursing.     Diet: 2 Gram Sodium Diet    DVT Prophylaxis: Pneumatic Compression Devices and restart lovenox  Cope Catheter: not present  Code Status: Full Code           Disposition Plan   Expected discharge: continue inpatient care  Entered: Jazmine Lewis MD 01/30/2021, 12:26 PM       The patient's care was discussed with the bedside nurse and patient.  Jazmine Lewis MD  Hospitalist Service  [facility]  Contact information available via [facility] Paging/Directory    ______________________________________________________________________    Interval History   Visited with patient today, he is on 5 L of oxygen, he appears quite forgetful, occasionally need a repetition of the same facts, he mentions that he still has some shortness of breath with minimal activity.    Data reviewed today: I reviewed all medications, new labs and imaging results over the last 24 hours. I personally reviewed no images or EKG's today.    Physical Exam   Vital Signs: Temp: 98  F (36.7  C) Temp src: Oral BP: (!) 138/99 Pulse: 128   Resp: 16 SpO2: 97 % O2 Device: Nasal cannula Oxygen Delivery: 3 LPM  Weight: 159 lbs 12.8 oz  Constitutional: awake, alert, cooperative, laying in bed, frail  Respiratory: coarse, diminished at the bases, coarse crackles heard bilaterally  Cardiovascular: regular rate and rhythm, normal S1 and S2, no murmur noted  GI: normal bowel sounds, soft, non-distended, non-tender  Skin: warm, dry  Musculoskeletal: no lower extremity pitting edema  present  Neurologic: awake, alert, answers some questions appropriately but also appears confused at times during the same conversation    Data   Recent Labs   Lab 01/30/21  0551 01/29/21  0601 01/28/21  1129 01/28/21  0800 01/25/21  0948 01/25/21  0948   WBC 9.8 11.3*  --  22.3*   < > 23.1*   HGB 9.9* 9.9*  --  11.1*   < > 12.5*   MCV 90 92  --  92   < > 88    238  --  285   < > 162   INR  --   --   --   --   --  1.24*    141 139 141   < > 128*   POTASSIUM 4.1 4.7 4.4 4.2   < > 4.4   CHLORIDE 100 106 105 107   < > 90*   CO2 35* 32 28 33*   < > 31   BUN 29 24 19 19   < > 17   CR 0.66 0.57* 0.54* 0.63*   < > 0.66   ANIONGAP 3 3 6 1*   < > 7   SHEEBA 8.4* 8.3* 8.5 8.5   < > 9.1   * 143* 143* 116*   < > 148*   ALBUMIN  --   --   --   --   --  2.6*   PROTTOTAL  --   --   --  6.3*  --  7.4   BILITOTAL  --   --   --   --   --  0.8   ALKPHOS  --   --   --   --   --  83   ALT  --   --   --   --   --  16   AST  --   --   --   --   --  17   TROPI  --   --   --   --   --  0.024    < > = values in this interval not displayed.     Medications     - MEDICATION INSTRUCTIONS -       - MEDICATION INSTRUCTIONS -       - MEDICATION INSTRUCTIONS -         ceFEPIme (MAXIPIME) IV  2 g Intravenous Q8H     enoxaparin ANTICOAGULANT  40 mg Subcutaneous Q24H     fluticasone-vilanterol  1 puff Inhalation Daily     folic acid  1 mg Oral Daily     methylPREDNISolone  62.5 mg Intravenous Q12H     metroNIDAZOLE  500 mg Intravenous Q12H     sodium chloride 0.9 %  100 mL Intravenous Once     umeclidinium  1 puff Inhalation Daily

## 2021-01-30 NOTE — PROGRESS NOTES
"PULMONOLOGY PROGRESS NOTE    Date of Admission: 1/25/2021    CC/Reason for Hospital visit: copd, hypoxia, resp failure  SUBJECTIVE      Better today, sitting up in bed, o2 4-5L. Thick, productive cough.      ROS: A Problem Pertinent review of systems was negative except for items noted in HPI.  Past Medical, Family, and Social/Substance History has been reviewed: No interval changes.   OBJECTIVE   Vital signs:  Temp: 98  F (36.7  C) Temp src: Oral BP: (!) 138/99 Pulse: 128   Resp: 16 SpO2: 97 % O2 Device: Nasal cannula Oxygen Delivery: 3 LPM   Weight: 72.5 kg (159 lb 12.8 oz)  Estimated body mass index is 22.29 kg/m  as calculated from the following:    Height as of 4/6/20: 1.803 m (5' 11\").    Weight as of this encounter: 72.5 kg (159 lb 12.8 oz).        I/O last 3 completed shifts:  In: -   Out: 1400 [Urine:1400]      CONSTITUTIONAL/GENERAL APPEARANCE: Alert male, frail, ill appearing  EARS, NOSE,THROAT,MOUTH: External ears and nose overall normal.  NECK: Neck appearance normal.  RESPIRATORY: dec, coarse, tachypnea  CARDIOVASCULAR: S1, S2, regular    LABORATORY ASSESSMENT    Arterial Blood Gas  Recent Labs   Lab 01/28/21  1410 01/28/21  1122 01/25/21  0948   PH 7.33* 7.31*  --    PCO2 62* 61*  --    PO2 128* 94  --    HCO3 33* 31*  --    O2PER 60% 80% 50     CBC  Recent Labs   Lab 01/30/21  0551 01/29/21  0601 01/28/21  0800 01/27/21  0613   WBC 9.8 11.3* 22.3* 12.9*   RBC 3.40* 3.49* 3.94* 3.55*   HGB 9.9* 9.9* 11.1* 10.0*   HCT 30.6* 32.0* 36.1* 32.4*   MCV 90 92 92 91   MCH 29.1 28.4 28.2 28.2   MCHC 32.4 30.9* 30.7* 30.9*   RDW 15.1* 15.3* 15.3* 15.4*    238 285 188     BMP  Recent Labs   Lab 01/30/21  0551 01/29/21  0601 01/28/21  1129 01/28/21  0800    141 139 141   POTASSIUM 4.1 4.7 4.4 4.2   CHLORIDE 100 106 105 107   SHEEBA 8.4* 8.3* 8.5 8.5   CO2 35* 32 28 33*   BUN 29 24 19 19   CR 0.66 0.57* 0.54* 0.63*   * 143* 143* 116*     INR  Recent Labs   Lab 01/25/21  0948   INR 1.24*      BNPNo " lab results found in last 7 days.  VENOUS BLOOD GASES  Recent Labs   Lab 01/25/21  0948   PHV 7.31*   PCO2V 65*   PO2V 58*   HCO3V 33*   JUANA 4.3         Additional labs and/or comments:     IMAGING         CT Chest 1/25:  1.  Left lower lobe atelectasis was present on the prior study and is  not significantly changed. Large masslike airspace opacity in the  lateral left upper lobe is new and likely infectious given its rapid  development. The spiculated airspace opacity in the right middle lobe  is also completely new compared to the prior study and more likely to  represent infection. Hilar and mediastinal adenopathy is assumed to be  reactive. The possibility of aggressive malignancy cannot be entirely  excluded. Consider bronchoscopy.  2.  Infrarenal abdominal aortic aneurysm measures 5.8 cm compared to  3.7 cm on 8/2/2013. Vascular surgery referral recommended.        cxr 1/28: Interval increase in density involving the left lung most compatible with increasing pneumonia with interstitial component. More focal dense component along the lateral aspect left perihilar region remain stable. Mild atelectasis medial   aspect right lung base with prominence of the right hilar region related to the focal soft tissue density in this area on the CT. Mild cardiac enlargement. Normal pulmonary vascularity.      PFT & OTHER TESTING       ASSESSMENT / PLAN      Pulmonary diagnoses:  Abnl CT/CXR R91.8  Adenopathy R59.9  COPD exacerb J44.1  Cough R05  Hypoxemia R09.02  Nicotine depend F17.210  Pleural effusion  Pneumonia unspec J18.9  Resp fail acute J96.00  Resp fail chronic J96.10        ASSESSMENT : 76M, very severe o2 dep copd admitted with resp failure and dense bilateral L>R infiltrates. Slowly improving with abx and steroids. ? Aspiration. Too unstable for bronch, does have likely parapneumonic effusion. Given the relatively rapid development of infiltrates, suspect infectious rather than malignancy but malignancy  remains a possibility.        PLAN:     left sided diagnostic / therapeutic thoracentesis done 1/28. Page, cyto pending.    Wean o2, goal sat ~ 88%, prn NIV    Continue abx    Continue steroids    Continue bronchodilators    Flutter valve and IS. Prn mucinex    Follow Sputum/blood cultures    Anticipate extended course of abx. Will need outpt imaging in 8 weeks    Agree with ongoing goals of care discussion. Does have very severe end-stage COPD    Will follow up Monday, please call if needed      Varun Santana  [facility]  Office: [phone]  Pager: [phone]

## 2021-01-30 NOTE — PROGRESS NOTES
IMC status. A&O to self and place only. Frequently forgetful, occasionally taking off O2 mask and heart and O2 monitor. VSS on 4L oximask. Tele SR. Denies CP/SOB/pain. T/R q2 hours, pt occasionally refuses. External catheter in place. Lovenox started. Will continue to monitor.

## 2021-01-31 LAB
BACTERIA SPEC CULT: NO GROWTH
CREAT SERPL-MCNC: 0.59 MG/DL (ref 0.66–1.25)
GFR SERPL CREATININE-BSD FRML MDRD: >90 ML/MIN/{1.73_M2}
Lab: NORMAL
PLATELET # BLD AUTO: 261 10E9/L (ref 150–450)
SPECIMEN SOURCE: NORMAL

## 2021-01-31 PROCEDURE — 99233 SBSQ HOSP IP/OBS HIGH 50: CPT | Performed by: INTERNAL MEDICINE

## 2021-01-31 PROCEDURE — 250N000011 HC RX IP 250 OP 636: Performed by: HOSPITALIST

## 2021-01-31 PROCEDURE — 250N000009 HC RX 250: Performed by: NURSE PRACTITIONER

## 2021-01-31 PROCEDURE — 36415 COLL VENOUS BLD VENIPUNCTURE: CPT | Performed by: HOSPITALIST

## 2021-01-31 PROCEDURE — 999N000157 HC STATISTIC RCP TIME EA 10 MIN

## 2021-01-31 PROCEDURE — 250N000013 HC RX MED GY IP 250 OP 250 PS 637: Performed by: HOSPITALIST

## 2021-01-31 PROCEDURE — 82565 ASSAY OF CREATININE: CPT | Performed by: HOSPITALIST

## 2021-01-31 PROCEDURE — 210N000002 HC R&B HEART CARE

## 2021-01-31 PROCEDURE — 85049 AUTOMATED PLATELET COUNT: CPT | Performed by: HOSPITALIST

## 2021-01-31 PROCEDURE — 250N000013 HC RX MED GY IP 250 OP 250 PS 637: Performed by: NURSE PRACTITIONER

## 2021-01-31 PROCEDURE — 94640 AIRWAY INHALATION TREATMENT: CPT

## 2021-01-31 PROCEDURE — 94640 AIRWAY INHALATION TREATMENT: CPT | Mod: 76

## 2021-01-31 PROCEDURE — 250N000012 HC RX MED GY IP 250 OP 636 PS 637: Performed by: INTERNAL MEDICINE

## 2021-01-31 RX ADMIN — ALBUTEROL SULFATE 2 PUFF: 90 INHALANT RESPIRATORY (INHALATION) at 06:37

## 2021-01-31 RX ADMIN — ACETYLCYSTEINE 2 ML: 200 SOLUTION ORAL; RESPIRATORY (INHALATION) at 19:58

## 2021-01-31 RX ADMIN — METRONIDAZOLE 500 MG: 500 INJECTION, SOLUTION INTRAVENOUS at 12:39

## 2021-01-31 RX ADMIN — ACETYLCYSTEINE 2 ML: 200 SOLUTION ORAL; RESPIRATORY (INHALATION) at 08:02

## 2021-01-31 RX ADMIN — GUAIFENESIN 600 MG: 600 TABLET, EXTENDED RELEASE ORAL at 22:00

## 2021-01-31 RX ADMIN — UMECLIDINIUM 1 PUFF: 62.5 AEROSOL, POWDER ORAL at 08:00

## 2021-01-31 RX ADMIN — ACETYLCYSTEINE 2 ML: 200 SOLUTION ORAL; RESPIRATORY (INHALATION) at 11:36

## 2021-01-31 RX ADMIN — METRONIDAZOLE 500 MG: 500 INJECTION, SOLUTION INTRAVENOUS at 01:00

## 2021-01-31 RX ADMIN — CEFEPIME HYDROCHLORIDE 2 G: 2 INJECTION, POWDER, FOR SOLUTION INTRAVENOUS at 08:00

## 2021-01-31 RX ADMIN — GUAIFENESIN 600 MG: 600 TABLET, EXTENDED RELEASE ORAL at 08:00

## 2021-01-31 RX ADMIN — CEFEPIME HYDROCHLORIDE 2 G: 2 INJECTION, POWDER, FOR SOLUTION INTRAVENOUS at 00:08

## 2021-01-31 RX ADMIN — IPRATROPIUM BROMIDE AND ALBUTEROL SULFATE 3 ML: .5; 3 SOLUTION RESPIRATORY (INHALATION) at 15:16

## 2021-01-31 RX ADMIN — ENOXAPARIN SODIUM 40 MG: 40 INJECTION SUBCUTANEOUS at 18:22

## 2021-01-31 RX ADMIN — PREDNISONE 60 MG: 20 TABLET ORAL at 08:00

## 2021-01-31 RX ADMIN — IPRATROPIUM BROMIDE AND ALBUTEROL SULFATE 3 ML: .5; 3 SOLUTION RESPIRATORY (INHALATION) at 08:03

## 2021-01-31 RX ADMIN — CEFEPIME HYDROCHLORIDE 2 G: 2 INJECTION, POWDER, FOR SOLUTION INTRAVENOUS at 18:22

## 2021-01-31 RX ADMIN — ACETYLCYSTEINE 2 ML: 200 SOLUTION ORAL; RESPIRATORY (INHALATION) at 15:15

## 2021-01-31 RX ADMIN — IPRATROPIUM BROMIDE AND ALBUTEROL SULFATE 3 ML: .5; 3 SOLUTION RESPIRATORY (INHALATION) at 11:36

## 2021-01-31 RX ADMIN — FLUTICASONE FUROATE AND VILANTEROL TRIFENATATE 1 PUFF: 200; 25 POWDER RESPIRATORY (INHALATION) at 08:00

## 2021-01-31 RX ADMIN — FOLIC ACID 1 MG: 1 TABLET ORAL at 08:00

## 2021-01-31 RX ADMIN — IPRATROPIUM BROMIDE AND ALBUTEROL SULFATE 3 ML: .5; 3 SOLUTION RESPIRATORY (INHALATION) at 19:58

## 2021-01-31 ASSESSMENT — ACTIVITIES OF DAILY LIVING (ADL)
ADLS_ACUITY_SCORE: 23
ADLS_ACUITY_SCORE: 19
ADLS_ACUITY_SCORE: 23
ADLS_ACUITY_SCORE: 23
ADLS_ACUITY_SCORE: 19
ADLS_ACUITY_SCORE: 23

## 2021-01-31 NOTE — PROGRESS NOTES
Pt didn't tolerated vest therapy, 02 Sats 85% while on vest and pt stated he could not breathe. Vest therapy was stopped and pt was placed back on 10L oxmask and Sp02 is 98%.

## 2021-01-31 NOTE — PROGRESS NOTES
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  CT of the chest indicates cavitary lesion of the lungs.    Pneumonia of both lungs due to infectious organism, unspecified part of lung  Cavitary pneumonia  Masslike airspace opacity with concern for possible malignancy  End-stage COPD (FEV1 27%)  Left pleural effusion  Acute on chronic hypoxic respiratory failure    Patient is intermittently on 5 L of oxygen at home chronically, he continues to smoke half pack per day, currently presenting with 1 week history of shortness of breath, patient had received the COVID vaccine in the last week.    CT of the chest showed left lower lobe atelectatic cyst and a larger masslike airspace opacity in the lateral left upper lobe, which is new, there is also a spiculated airspace opacity in the right middle lobe.  There is associated hilar and mediastinal adenopathy.    Patient has history of chronic cough, currently he has a cavitary lesion in the left side of the lungs, the differential diagnosis includes cavitary pneumonia versus malignancy, tuberculosis was also a differential.Initially was placed on BiPAP in the ER and admitted to the ICU, subsequently weaned down to 5 lpm of supplemental oxygen by nasal cannula and transferred out of the ICU on 1/27/21.    QuantiFERON gold negative.Influenza and COVID-19 PCR negative on 1/25/21.    Infectious disease and pulmonology consulted, appreciate their assistance.    Continue cefepime and flagyl per ID. Vancomycin discontinued as nasal MRSA screen was negative.    Continue PTA Breo Ellipta and Incruse Ellipta. PRN albuterol available.  As needed Mucinex ordered.Continue steroids, switch to p.o. prednisone 60 mg,  1/30    Respiratory status deteriorated on 1/28/21. RRT called, see notes for details. Improved after he was placed on BiPAP. Transferred to IM status.  Patient is currently off of  BiPAP and is on nasal cannula O2, still continues to need 5 L of oxygen.  Plan to wean for SPO2 88% .    S/p thoracentesis on 1/28/21 with 900 ml of fluid removed. Culture negative to date. Cytology pending.  Since patient is close to his baseline O2 needs, need to discuss discharge plans, possibly will need antibiotics for longer term IV, cultures growing Stenotrophomonas maltophilia, colonization versus infection.  White count is currently back to normal.    Patient was transferred to medical floor on telemetry 1/30.  Patient further decompensated on 1/30 for which he was placed on BiPAP and transferred to [facility] [place].  This was discussed in detail with the patient's daughter, recommended not to pursue intubation if it comes to that, she is still thinking about it.    Need to decide on the antibiotic duration.    Goals of care    Very severe COPD (FEV1 27%) requiring 4-5 lpm of supplemental oxygen at baseline. Now admitted with severe pneumonia and possible underlying malignancy. Concerned about prognosis going forward. Also concerned that if he were to get to the point of requiring intubation, the likelihood of good outcome would be quite poor.    Discussed with patient and his daughter. He currently wants 'everything done' although given his acute illness I am not sure that he fully understands the conversation. His daughter feels that he would want everything done initially, but would not want to be kept alive on a ventilator for a prolonged period of time.    Palliative care consulted, appreciate their assistance, see notes for details.    Patient remains full code, but states he wouldn't want to be kept alive on machines for a prolonged period of time.    I had another discussion with the patient's daughter which lasted more than 40  minutes which included rest of immediate family, currently patient's daughter is strongly thinking that patient should not be intubated if his current situation deteriorates.  But she could not be sure during our conversation.    Abdominal aortic aneurysm    CT study on 1/25/21 showed an abdominal aortic aneurysm measuring 5.8 cm compared to 3.57 cm from 8/2/2013.    Vascular surgery consulted, appreciate their assistance.    No acute intervention recommended at this time due to his co-morbidities. Likely plan for outpatient follow-up when/if he recovers from his pulmonary issues.    Chronic gout of multiple sites    Currently asymptomatic.    Moderate Dementia     Answers orientation questions appropriately, but seems forgetful at times.    Daughter confirms diagnosis of dementia and states he is forgetful but has been able to live independently with his wife.    Plan of care has been discussed with patient and nursing.    Bleeding from scrotum    Pinpoint area of bleeding on left scrotum on 1/26/21.    Resolved with application of pressure by nursing.     Diet: 2 Gram Sodium Diet  Snacks/Supplements Adult: Boost Shake; With Meals    DVT Prophylaxis: Pneumatic Compression Devices and restart lovenox  Cope Catheter: not present  Code Status: Full Code           Disposition Plan   Expected discharge: continue inpatient care  Entered: Jazmine Lewis MD 01/31/2021, 2:21 PM       The patient's care was discussed with the bedside nurse and patient.  Contacted patient's daughter, discussed CODE STATUS further and current progress and deteriorations that we are seeing.  Total time spent on discussion with family and at bedside 65 minutes.  Jazmine Lewis MD  Hospitalist Service  [facility]  Contact information available via [facility] Paging/Directory    ______________________________________________________________________    Interval History   Overnight events noted, he was transferred back to [facility] [place]  with a BiPAP yesterday evening, patient is currently on 5-6 L nasal cannula O2, in the morning percussion vest was tried and he became quite short of breath.  It was removed and patient was placed temporarily on nonrebreather.  Patient daughter is currently visiting him, we readdressed the need to discuss CODE STATUS with him and he is more lucid.  Patient had diarrhea yesterday evening, C. difficile testing done which is negative.  Diarrhea improving.    Data reviewed today: I reviewed all medications, new labs and imaging results over the last 24 hours. I personally reviewed no images or EKG's today.    Physical Exam   Vital Signs: Temp: 97.5  F (36.4  C) Temp src: Oral BP: 133/62 Pulse: 94   Resp: 16 SpO2: 93 % O2 Device: Nasal cannula Oxygen Delivery: 6 LPM  Weight: 162 lbs 0 oz  Constitutional: awake, alert, cooperative, laying in bed, frail  Respiratory: coarse, diminished at the bases, coarse crackles heard bilaterally  Cardiovascular: regular rate and rhythm, normal S1 and S2, no murmur noted  GI: normal bowel sounds, soft, non-distended, non-tender  Skin: warm, dry  Musculoskeletal: no lower extremity pitting edema present  Neurologic: awake, alert, answers some questions appropriately but also appears confused at times during the same conversation    Data   Recent Labs   Lab 01/31/21  0613 01/30/21  0551 01/29/21  0601 01/28/21  1129 01/28/21  0800 01/25/21  0948 01/25/21  0948   WBC  --  9.8 11.3*  --  22.3*   < > 23.1*   HGB  --  9.9* 9.9*  --  11.1*   < > 12.5*   MCV  --  90 92  --  92   < > 88    282 238  --  285   < > 162   INR  --   --   --   --   --   --  1.24*   NA  --  138 141 139 141   < > 128*   POTASSIUM  --  4.1 4.7 4.4 4.2   < > 4.4   CHLORIDE  --  100 106 105 107   < > 90*   CO2  --  35* 32 28 33*   < > 31   BUN  --  29 24 19 19   < > 17   CR 0.59* 0.66 0.57* 0.54* 0.63*   < > 0.66   ANIONGAP  --  3 3 6 1*   < > 7   SHEEBA  --  8.4* 8.3* 8.5 8.5   < > 9.1   GLC  --  141* 143* 143* 116*    < > 148*   ALBUMIN  --   --   --   --   --   --  2.6*   PROTTOTAL  --   --   --   --  6.3*  --  7.4   BILITOTAL  --   --   --   --   --   --  0.8   ALKPHOS  --   --   --   --   --   --  83   ALT  --   --   --   --   --   --  16   AST  --   --   --   --   --   --  17   TROPI  --   --   --   --   --   --  0.024    < > = values in this interval not displayed.     Medications     - MEDICATION INSTRUCTIONS -       - MEDICATION INSTRUCTIONS -       - MEDICATION INSTRUCTIONS -         acetylcysteine  2 mL Nebulization 4x Daily     ceFEPIme (MAXIPIME) IV  2 g Intravenous Q8H     enoxaparin ANTICOAGULANT  40 mg Subcutaneous Q24H     fluticasone-vilanterol  1 puff Inhalation Daily     folic acid  1 mg Oral Daily     guaiFENesin  600 mg Oral BID     ipratropium - albuterol 0.5 mg/2.5 mg/3 mL  3 mL Nebulization 4x daily     metroNIDAZOLE  500 mg Intravenous Q12H     predniSONE  60 mg Oral Daily     sodium chloride 0.9 %  100 mL Intravenous Once     umeclidinium  1 puff Inhalation Daily

## 2021-01-31 NOTE — PROGRESS NOTES
IMC and enteric precautions. A&O to self only. Frequently forgetful, occasionally taking off O2 mask and heart and O2 monitor. VSS on 4L oximask. Tele SR. Denies CP/pain. T/R q2 hours, pt occasionally refuses. External catheter in place. RRT called for respiratory distress, see note. Pt complaining with abdominal cramping w/ x3 loose stools, stool sample collected. Updated daughter Padmini, she plans to visit tomorrow. Will continue to monitor.

## 2021-01-31 NOTE — PROGRESS NOTES
"IMC. A&O to self only. VSS. Pt did not tolerate vest therapy and was placed on bipap for 2 hours. Now on 5L NC. Denies CP/pain. T/R q2 hours, pt occasionally refuses. Pt voiding in urinal. Very poor appetite, pt does like chocolate boost shakes. Aggressive IS and acapella done multiple time throughout shift. Will continue to monitor.    Jenna Washington bedside and a long conversation about goals of care were discussed, pt seemed very hesitant and confused about what it means to be \"Full Code\". Jenna Washington plans to discuss goals of care again tomorrow with palliative.  "

## 2021-01-31 NOTE — PROVIDER NOTIFICATION
MD Notification    Notified Person: MD    Notified Person Name:  Joshua     Notification Date/Time: 1/31 0922    Notification Interaction: text page     Purpose of Notification: FYI: Pt feeling more SOB and work of breathing has increased this AM. Did place pt back on bipap. Daughter Padmini will be by later today.     Orders Received: -    Comments: Attempted to call and update daughter Padmini. No answer and voicemail left.

## 2021-02-01 LAB
BACTERIA SPEC CULT: NO GROWTH
COPATH REPORT: NORMAL
ERYTHROCYTE [DISTWIDTH] IN BLOOD BY AUTOMATED COUNT: 15.2 % (ref 10–15)
HCT VFR BLD AUTO: 33.5 % (ref 40–53)
HGB BLD-MCNC: 10.5 G/DL (ref 13.3–17.7)
MCH RBC QN AUTO: 27.9 PG (ref 26.5–33)
MCHC RBC AUTO-ENTMCNC: 31.3 G/DL (ref 31.5–36.5)
MCV RBC AUTO: 89 FL (ref 78–100)
PLATELET # BLD AUTO: 251 10E9/L (ref 150–450)
RBC # BLD AUTO: 3.76 10E12/L (ref 4.4–5.9)
SPECIMEN SOURCE: NORMAL
WBC # BLD AUTO: 11.4 10E9/L (ref 4–11)

## 2021-02-01 PROCEDURE — 250N000011 HC RX IP 250 OP 636: Performed by: HOSPITALIST

## 2021-02-01 PROCEDURE — 250N000013 HC RX MED GY IP 250 OP 250 PS 637: Performed by: HOSPITALIST

## 2021-02-01 PROCEDURE — 94640 AIRWAY INHALATION TREATMENT: CPT

## 2021-02-01 PROCEDURE — 999N000157 HC STATISTIC RCP TIME EA 10 MIN

## 2021-02-01 PROCEDURE — 250N000013 HC RX MED GY IP 250 OP 250 PS 637: Performed by: NURSE PRACTITIONER

## 2021-02-01 PROCEDURE — 250N000009 HC RX 250: Performed by: NURSE PRACTITIONER

## 2021-02-01 PROCEDURE — 85027 COMPLETE CBC AUTOMATED: CPT | Performed by: INTERNAL MEDICINE

## 2021-02-01 PROCEDURE — 99233 SBSQ HOSP IP/OBS HIGH 50: CPT | Performed by: NURSE PRACTITIONER

## 2021-02-01 PROCEDURE — 999N000147 HC STATISTIC PT IP EVAL DEFER: Performed by: PHYSICAL THERAPIST

## 2021-02-01 PROCEDURE — 99233 SBSQ HOSP IP/OBS HIGH 50: CPT | Performed by: INTERNAL MEDICINE

## 2021-02-01 PROCEDURE — 94668 MNPJ CHEST WALL SBSQ: CPT

## 2021-02-01 PROCEDURE — 210N000002 HC R&B HEART CARE

## 2021-02-01 PROCEDURE — 94640 AIRWAY INHALATION TREATMENT: CPT | Mod: 76

## 2021-02-01 PROCEDURE — 250N000012 HC RX MED GY IP 250 OP 636 PS 637: Performed by: INTERNAL MEDICINE

## 2021-02-01 PROCEDURE — 250N000009 HC RX 250: Performed by: INTERNAL MEDICINE

## 2021-02-01 PROCEDURE — 36415 COLL VENOUS BLD VENIPUNCTURE: CPT | Performed by: INTERNAL MEDICINE

## 2021-02-01 RX ADMIN — CEFEPIME HYDROCHLORIDE 2 G: 2 INJECTION, POWDER, FOR SOLUTION INTRAVENOUS at 16:29

## 2021-02-01 RX ADMIN — QUETIAPINE 25 MG: 25 TABLET ORAL at 07:54

## 2021-02-01 RX ADMIN — MORPHINE SULFATE 1 MG: 2 INJECTION, SOLUTION INTRAMUSCULAR; INTRAVENOUS at 11:07

## 2021-02-01 RX ADMIN — ACETYLCYSTEINE 2 ML: 200 SOLUTION ORAL; RESPIRATORY (INHALATION) at 21:13

## 2021-02-01 RX ADMIN — GUAIFENESIN 600 MG: 600 TABLET, EXTENDED RELEASE ORAL at 08:01

## 2021-02-01 RX ADMIN — ACETYLCYSTEINE 2 ML: 200 SOLUTION ORAL; RESPIRATORY (INHALATION) at 14:52

## 2021-02-01 RX ADMIN — FOLIC ACID 1 MG: 1 TABLET ORAL at 08:01

## 2021-02-01 RX ADMIN — CEFEPIME HYDROCHLORIDE 2 G: 2 INJECTION, POWDER, FOR SOLUTION INTRAVENOUS at 00:58

## 2021-02-01 RX ADMIN — CEFEPIME HYDROCHLORIDE 2 G: 2 INJECTION, POWDER, FOR SOLUTION INTRAVENOUS at 08:01

## 2021-02-01 RX ADMIN — UMECLIDINIUM 1 PUFF: 62.5 AEROSOL, POWDER ORAL at 07:52

## 2021-02-01 RX ADMIN — ACETYLCYSTEINE 2 ML: 200 SOLUTION ORAL; RESPIRATORY (INHALATION) at 07:24

## 2021-02-01 RX ADMIN — ENOXAPARIN SODIUM 40 MG: 40 INJECTION SUBCUTANEOUS at 17:48

## 2021-02-01 RX ADMIN — IPRATROPIUM BROMIDE AND ALBUTEROL SULFATE 3 ML: .5; 3 SOLUTION RESPIRATORY (INHALATION) at 07:24

## 2021-02-01 RX ADMIN — IPRATROPIUM BROMIDE AND ALBUTEROL SULFATE 3 ML: .5; 3 SOLUTION RESPIRATORY (INHALATION) at 14:52

## 2021-02-01 RX ADMIN — IPRATROPIUM BROMIDE AND ALBUTEROL SULFATE 3 ML: .5; 3 SOLUTION RESPIRATORY (INHALATION) at 21:14

## 2021-02-01 RX ADMIN — IPRATROPIUM BROMIDE AND ALBUTEROL SULFATE 3 ML: .5; 3 SOLUTION RESPIRATORY (INHALATION) at 11:23

## 2021-02-01 RX ADMIN — GUAIFENESIN 600 MG: 600 TABLET, EXTENDED RELEASE ORAL at 20:55

## 2021-02-01 RX ADMIN — MORPHINE SULFATE 1 MG: 2 INJECTION, SOLUTION INTRAMUSCULAR; INTRAVENOUS at 20:56

## 2021-02-01 RX ADMIN — IPRATROPIUM BROMIDE AND ALBUTEROL SULFATE 3 ML: .5; 3 SOLUTION RESPIRATORY (INHALATION) at 05:53

## 2021-02-01 RX ADMIN — FLUTICASONE FUROATE AND VILANTEROL TRIFENATATE 1 PUFF: 200; 25 POWDER RESPIRATORY (INHALATION) at 08:02

## 2021-02-01 RX ADMIN — PREDNISONE 60 MG: 20 TABLET ORAL at 08:01

## 2021-02-01 RX ADMIN — METRONIDAZOLE 500 MG: 500 INJECTION, SOLUTION INTRAVENOUS at 12:18

## 2021-02-01 RX ADMIN — ONDANSETRON 4 MG: 2 INJECTION INTRAMUSCULAR; INTRAVENOUS at 19:48

## 2021-02-01 RX ADMIN — METRONIDAZOLE 500 MG: 500 INJECTION, SOLUTION INTRAVENOUS at 01:34

## 2021-02-01 ASSESSMENT — ACTIVITIES OF DAILY LIVING (ADL)
ADLS_ACUITY_SCORE: 21
ADLS_ACUITY_SCORE: 20
ADLS_ACUITY_SCORE: 21

## 2021-02-01 NOTE — PLAN OF CARE
VSS, does get tachypneic when anxious, SOB. PRN seroquel helped some, still needs ques to slow breathing and calm his mind. Did continue to complain of SOB, placed on BIPAP for approx 2 minutes when pt demanded it to be taken off. Morphine PRN helped significantly. 5L NC with sats around 90%. Productive cough, Yankauer at bedside. Turn/repo when pt allows, has refused side turns/pillows in bed and heels elevation. Poor appetite. Incontinent of stool and sometimes urine. High néstor risk score but skin appears intact. Pt was very hesitant to allow turning even for incontinence care. IMC status was stopped, pt remains heavy in terms of nursing care required. Frequent/repeated requests, much less so when feeling better in the afternoon. Palliative involved, orders in place for DNR/DNI and no escalation of care level.

## 2021-02-01 NOTE — PROGRESS NOTES
"SPIRITUAL HEALTH SERVICES Progress Note  [facility]    Visited pt due to illness status/palliative consultation. I introduced myself and  services and he invited me to stay.    I offered reflective listening and affirmation of feelings and meaning as Jah told me that \"today isn't a good day\". We talked about how he is coping with his health situation and he said \"like what everyone does, I blame everyone except myself\". He talked about he support of his daughter and that she visited him yesterday. Jah said he was becoming sleepy and would like to rest and asked that I return for a visit later today or tomorrow. I will visit him tomorrow.    [facility] remains available.      Padmini Edgar  Chaplain Resident    "

## 2021-02-01 NOTE — PROGRESS NOTES
"PULMONOLOGY PROGRESS NOTE    Date of Admission: 1/25/2021    CC/Reason for Hospital visit: copd, hypoxia, resp failure  SUBJECTIVE      Better today with morphine. Sitting up in bed. Goals of care and dispo discussions ongoing.    ROS: A Problem Pertinent review of systems was negative except for items noted in HPI.  Past Medical, Family, and Social/Substance History has been reviewed: No interval changes.   OBJECTIVE   Vital signs:  Temp: 98.2  F (36.8  C) Temp src: Oral BP: 126/65 Pulse: 117   Resp: 16 SpO2: 91 % O2 Device: Nasal cannula Oxygen Delivery: 5 LPM   Weight: 73.6 kg (162 lb 4.8 oz)  Estimated body mass index is 22.64 kg/m  as calculated from the following:    Height as of 4/6/20: 1.803 m (5' 11\").    Weight as of this encounter: 73.6 kg (162 lb 4.8 oz).        I/O last 3 completed shifts:  In: 180 [P.O.:180]  Out: 650 [Urine:650]      CONSTITUTIONAL/GENERAL APPEARANCE: Alert male, frail, ill appearing  EARS, NOSE,THROAT,MOUTH: External ears and nose overall normal.  NECK: Neck appearance normal.  RESPIRATORY: dec, coarse, tachypnea  CARDIOVASCULAR: S1, S2, regular    LABORATORY ASSESSMENT    Arterial Blood Gas  Recent Labs   Lab 01/28/21  1410 01/28/21  1122   PH 7.33* 7.31*   PCO2 62* 61*   PO2 128* 94   HCO3 33* 31*   O2PER 60% 80%     CBC  Recent Labs   Lab 02/01/21  0552 01/31/21  0613 01/30/21  0551 01/29/21  0601 01/28/21  0800   WBC 11.4*  --  9.8 11.3* 22.3*   RBC 3.76*  --  3.40* 3.49* 3.94*   HGB 10.5*  --  9.9* 9.9* 11.1*   HCT 33.5*  --  30.6* 32.0* 36.1*   MCV 89  --  90 92 92   MCH 27.9  --  29.1 28.4 28.2   MCHC 31.3*  --  32.4 30.9* 30.7*   RDW 15.2*  --  15.1* 15.3* 15.3*    261 282 238 285     BMP  Recent Labs   Lab 01/31/21  0613 01/30/21  0551 01/29/21  0601 01/28/21  1129 01/28/21  0800   NA  --  138 141 139 141   POTASSIUM  --  4.1 4.7 4.4 4.2   CHLORIDE  --  100 106 105 107   SHEEBA  --  8.4* 8.3* 8.5 8.5   CO2  --  35* 32 28 33*   BUN  --  29 24 19 19   CR 0.59* 0.66 0.57* " 0.54* 0.63*   GLC  --  141* 143* 143* 116*     INR  No lab results found in last 7 days.   BNPNo lab results found in last 7 days.  VENOUS BLOOD GASES  No lab results found in last 7 days.      Additional labs and/or comments:     IMAGING         CT Chest 1/25:  1.  Left lower lobe atelectasis was present on the prior study and is  not significantly changed. Large masslike airspace opacity in the  lateral left upper lobe is new and likely infectious given its rapid  development. The spiculated airspace opacity in the right middle lobe  is also completely new compared to the prior study and more likely to  represent infection. Hilar and mediastinal adenopathy is assumed to be  reactive. The possibility of aggressive malignancy cannot be entirely  excluded. Consider bronchoscopy.  2.  Infrarenal abdominal aortic aneurysm measures 5.8 cm compared to  3.7 cm on 8/2/2013. Vascular surgery referral recommended.        cxr 1/28: Interval increase in density involving the left lung most compatible with increasing pneumonia with interstitial component. More focal dense component along the lateral aspect left perihilar region remain stable. Mild atelectasis medial   aspect right lung base with prominence of the right hilar region related to the focal soft tissue density in this area on the CT. Mild cardiac enlargement. Normal pulmonary vascularity.      PFT & OTHER TESTING       ASSESSMENT / PLAN      Pulmonary diagnoses:  Abnl CT/CXR R91.8  Adenopathy R59.9  COPD exacerb J44.1  Cough R05  Hypoxemia R09.02  Nicotine depend F17.210  Pleural effusion  Pneumonia unspec J18.9  Resp fail acute J96.00  Resp fail chronic J96.10        ASSESSMENT : 76M, very severe o2 dep copd admitted with resp failure and dense bilateral L>R infiltrates. Slowly improving with abx and steroids. ? Aspiration. Too unstable for bronch, does have likely parapneumonic effusion. Given the relatively rapid development of infiltrates, suspect infectious  rather than malignancy but malignancy remains a possibility. Poor long term prognosis        PLAN:     left sided diagnostic / therapeutic thoracentesis done 1/28. [place], cyto negative.    Wean o2, goal sat ~ 88%, prn NIV    8d total of abx    Dec steroids by 10mg every 3d    Continue bronchodilators    Flutter valve and IS. Prn mucinex    Agree with ongoing goals of care discussion. Does have very severe end-stage COPD and would be reasonable for hospice.    No further suggestions. Will sign off. Please call if needed.      Varun Santana  [facility]  Office: [phone]  Pager: [phone]

## 2021-02-01 NOTE — CONSULTS
Care Management Follow Up    Length of Stay (days): 7    Expected Discharge Date: 02/03/21     Concerns to be Addressed:     Discharge planning  Patient plan of care discussed at interdisciplinary rounds: Yes    Anticipated Discharge Disposition: TBD      Anticipated Discharge Services:  TBD  Anticipated Discharge DME:  N/A    Patient/family educated on Medicare website which has current facility and service quality ratings:  N/A  Education Provided on the Discharge Plan:  N/A  Patient/Family in Agreement with the Plan:  N/A    Referrals Placed by CM/SW:  N/A  Private pay costs discussed: Not applicable    Additional Information:  Care management/social work  Consult acknowledged.  Spoke with Kasey from palliative care who states that she will see patient again tomorrow and possibly make more of a discharge plan.  Will continue to follow.      PAULINA Ware, [facility]    [phone]  [facility]

## 2021-02-01 NOTE — PLAN OF CARE
PT: Attempted to see for PT tonio. Pt had just finished respiratory treatment. Explained the role of PT, importance of moving and seeing his mobility level and level of assist  in order to return home per pt's desire, but pt declines any PT involvement today or in future. Order will be completed per pt's request.

## 2021-02-01 NOTE — PLAN OF CARE
Pt has been stable over night and has slept well.  He has maintain good O2 sat's on NC at 5L.  He does c/o SOB intermittently and has received PRN nebs.  His rhythm has been SB/SR.  VSS, no new issues noted.

## 2021-02-01 NOTE — PROGRESS NOTES
[facility]  Palliative Care Daily Progress Note       Recommendations & Counseling       Pt elects DNR/DNI and no escalation of care to ICU. This was reviewed with pt's daughter, who is in agreement     Continue present level of cares. I will check in again tomorrow via virtual visit. Prepared both pt and dtr that I believe this represents pt's dying process, and we should likely anticipate a transition to comfort measures in the coming days     Suspect pt will likely die in the hospital     Dtr did ask for an exception for pt's wife to visit this afternoon. I discussed with unit manager and this was declined. Dtr voiced understanding      Case was reviewed with bedside RN Nubia, unit SW Flor, and Dr. Lewis.    JULIA Gutierrez CNP  [facility]  Contact information available via [facility] Paging/Directory      Thank you for the opportunity to participate in the care of this patient and family. Our team: will continue to follow.     During regular M-F work hours ([phone]) -- if you are not sure who specifically to contact -- please contact us in [facility] Smart Web.     After regular work hours and on weekends/holidays, you can call our answering service at [phone].     Attestation:  Total time on the floor involved in the patient's care: 45 minutes  Total time spent in counseling/care coordination: >50%     Assessments          Jah Tate is a 76 year old male with PMH significant for severe end stage COPD on chronic 5L O2 at home, AAA, gout, and mild-moderate dementia who presents with acute on chronic respiratory failure 2/2 bilateral PNA. Pulmonary and ID following. Suspect infectious etiology, on broad spectrum abx. Cavitary lesions possibly could represent malignancy. S/p left thoracentesis 1/28 with 900cc removed. The last few days have brought a rather tenuous respiratory condition, requiring intermittent episodes on bipap, yet pt's compliance and  tolerance with bipap has been poor.     Today, the patient was seen for:  Goals of care    Visited Jah this AM. He is seen resting in bed. Just completed a nebulizer. This is the first one he has tolerated through the entire treatment. Jah recalls meeting me on Friday. He states that he doesn't feel well. I express worry that he is getting sicker here in the hospital. We acknowledge that his reserves are very limited in the setting of his COPD and now PNA. He looks very tired today and he states that he is very worn out.     Jah and I discuss the prospect of intubation, CPR, and the ICU. He and I agree that these treatments will be of no benefit to him at this point. He completely agrees and elects DNR/DNI and no escalation of care to the ICU. He would otherwise like to keep going with the rest of his cares and take it moment by moment. He and I agree that I will check in again tomorrow.    Spoke with dtr Padmini via phone. We discussed pt's ongoing tenuous breathing, and reviewed weekend events. I shared my conversation with Jah as detailed above. Padmini was supportive of his decisions, and agrees.     I prepared Padmini that this likely represents Jah's dying process, and that I am worried that time to live could be measured in days to weeks at this point. His condition could deteriorate at any point. We acknowledge the likelihood that moving toward comfort measures in the coming days would be a reasonable next step.     Dtr asked for an exception for pt's wife to visit. I reviewed with unit manager, who declined, per visitor policy. Dtr was notified and aware that I will be in communication with them tomorrow.     Prognosis, Goals, or Advance Care Planning was addressed today with: Yes.  Mood, coping, and/or meaning in the context of serious illness were addressed today: Yes.            Interval History:     Chart review/discussion with unit or clinical team members:   Tenuous resp condition, requiring  intermittent periods on bipap, yet low compliance/tolerance from pt.     Per patient or family/caregivers today:  Pt doesn't feel well and is worried something is wrong. He is very worn out.     Key Palliative Symptoms:  # Pain severity the last 12 hours: none  # Dyspnea severity the last 12 hours: severe  # Nausea severity the last 12 hours: none  # Anxiety severity the last 12 hours: severe    Patient is on opioids: bowels not assessed today.           Review of Systems:     Besides above, an additional N/A system ROS was reviewed and is unremarkable          Medications:     I have reviewed this patient's medication profile and medications during this hospitalization.    Noted meds:    Cefepime   Mucinex 600mg PO BID   Prednisone 60mg PO daily   Flagyl   Morphine 1mg IV Q2hrs PRN pain   Oxycodone 5-10mg PO Q3hrs PRN pain   Seroquel 25mg PO TID PRN agitation            Physical Exam:   Temp: 97.5  F (36.4  C) Temp src: Oral BP: 138/73 Pulse: 88   Resp: 16 SpO2: 91 % O2 Device: Nasal cannula Oxygen Delivery: 5 LPM  CONSTITUTIONAL: Chronically ill man who appears very fatigued, seen resting in bed in NAD. Calm and cooperative.  HEENT: NCAT  RESPIRATORY: NL respiratory effort on 5L via NC  NEUROLOGIC: Appropriately responsive during interview           Data Reviewed:     Recent imaging reviewed, my comments on pertinents:   Results for orders placed or performed during the hospital encounter of 01/25/21   XR Chest Port 1 View    Impression    IMPRESSION: There is masslike abnormal airspace opacity in the mid  left lung that is new compared to the prior study and associated with  a small left pleural effusion. There is also a trace right pleural  effusion. Findings are suggestive of pneumonia with parapneumonic  effusion. Continued radiographic follow-up until complete resolution  is recommended.    ORLANDO KRUSE MD   CT Chest Pulmonary Embolism w Contrast    Impression    IMPRESSION:  1.  Left lower lobe  atelectasis was present on the prior study and is  not significantly changed. Large masslike airspace opacity in the  lateral left upper lobe is new and likely infectious given its rapid  development. The spiculated airspace opacity in the right middle lobe  is also completely new compared to the prior study and more likely to  represent infection. Hilar and mediastinal adenopathy is assumed to be  reactive. The possibility of aggressive malignancy cannot be entirely  excluded. Consider bronchoscopy.  2.  Infrarenal abdominal aortic aneurysm measures 5.8 cm compared to  3.7 cm on 8/2/2013. Vascular surgery referral recommended.    ORLANDO KRUSE MD   CTA Abdomen Pelvis with Contrast    Impression    IMPRESSION:  1. Aneurysmal dilatation of the abdominal aorta measuring up to 5.4 x  6.0 cm, previously 3.7 cm.  2. Moderate left pleural effusion with associated atelectasis.  3. Diverticulosis without evidence of diverticulitis.    LINDA FIGUEROA DO   XR Chest Port 1 View    Impression    IMPRESSION: Interval increase in density involving the left lung most compatible with increasing pneumonia with interstitial component. More focal dense component along the lateral aspect left perihilar region remain stable. Mild atelectasis medial   aspect right lung base with prominence of the right hilar region related to the focal soft tissue density in this area on the CT. Mild cardiac enlargement. Normal pulmonary vascularity.   US Thoracentesis    Impression    IMPRESSION: Technically successful thoracentesis without immediate  complications.    BALTAZAR REESE MD   US Lower Extremity Venous Duplex Bilateral    Impression    IMPRESSION: Negative for deep venous thrombosis in both lower  extremities.    DADA GARCIA MD   XR Chest Port 1 View    Impression    IMPRESSION: Improved left-sided infiltrates compared to previous.  Improved right base infiltrate.    BALTAZAR REESE MD       Recent lab data reviewed, my comments on  pertinents:   Na 138  K 4.1  Creat 0.59  WBC 11.4  Hgb 10.5  Plt 251

## 2021-02-01 NOTE — PROGRESS NOTES
[facility]    Infectious Disease Progress Note    Date of Service (when I saw the patient): 02/01/2021     Assessment & Plan   Jah Tate is a 76 year old male who was admitted on 1/25/2021.     Impression:  1. 76 y.o male with end stage COPD, on 5 L of oxygen at home.   2. Current every day smoker.   3. Presenting with 1 week history of shortness of breath, patient had received Covid vaccine in the last week.  4. CT of the chest shows left lower lobe atelectatic cyst and a larger masslike airspace opacity in the lateral left upper lobe, which is new, there is also a spiculated airspace opacity in the right middle lobe.  There is associated hilar and mediastinal adenopathy.  5. Absolutely no risk factors for TB, never travelled out of [place] per patient, no dwelling in any shelter, jailhouse or detention. Quantiferon TB is negative, out of iso now.   7. PCN listed as allergy.        Recommendations:  Day 8 cefepime and flagyl, GNR noted on the sputum is stento not covered but likely just colonization  MRSA PCR neg, vanco stopped   Leucocytosis on steroids.   Goal of care discussion   ? Stop ABX likely OK any time discontinue flagyl today    Frank Farnsworth MD    Interval History   No fever   Quant tb is neg   Slightly more short of breath today   RRT again with hypoxia this WER    Physical Exam   Temp: 97.5  F (36.4  C) Temp src: Oral BP: 138/73 Pulse: 88   Resp: 16 SpO2: 91 % O2 Device: Nasal cannula Oxygen Delivery: 5 LPM  Vitals:    01/30/21 0600 01/31/21 0507 02/01/21 0307   Weight: 72.5 kg (159 lb 12.8 oz) 73.5 kg (162 lb) 73.6 kg (162 lb 4.8 oz)     Vital Signs with Ranges  Temp:  [97.5  F (36.4  C)-97.9  F (36.6  C)] 97.5  F (36.4  C)  Pulse:  [] 88  Resp:  [16-28] 16  BP: (118-145)/(57-73) 138/73  SpO2:  [91 %-99 %] 91 %    Constitutional: Awake,  Lungs: using accessory muscles, coarse   Cardiovascular: Regular rate and rhythm, normal S1 and S2, and no murmur noted  Abdomen:  Normal bowel sounds, soft, non-distended, non-tender  Skin: No rashes, no cyanosis, no edema  Other:    Medications     - MEDICATION INSTRUCTIONS -       - MEDICATION INSTRUCTIONS -       - MEDICATION INSTRUCTIONS -         acetylcysteine  2 mL Nebulization 4x Daily     ceFEPIme (MAXIPIME) IV  2 g Intravenous Q8H     enoxaparin ANTICOAGULANT  40 mg Subcutaneous Q24H     fluticasone-vilanterol  1 puff Inhalation Daily     folic acid  1 mg Oral Daily     guaiFENesin  600 mg Oral BID     ipratropium - albuterol 0.5 mg/2.5 mg/3 mL  3 mL Nebulization 4x daily     metroNIDAZOLE  500 mg Intravenous Q12H     predniSONE  60 mg Oral Daily     sodium chloride 0.9 %  100 mL Intravenous Once     umeclidinium  1 puff Inhalation Daily       Data   All microbiology laboratory data reviewed.  Recent Labs   Lab Test 02/01/21  0552 01/31/21  0613 01/30/21  0551 01/29/21  0601   WBC 11.4*  --  9.8 11.3*   HGB 10.5*  --  9.9* 9.9*   HCT 33.5*  --  30.6* 32.0*   MCV 89  --  90 92    261 282 238     Recent Labs   Lab Test 01/31/21  0613 01/30/21  0551 01/29/21  0601   CR 0.59* 0.66 0.57*     No lab results found.  Recent Labs   Lab Test 01/28/21  1430 01/26/21  2130 01/26/21  1210 01/26/21  0442 01/25/21  1016 06/25/20  2000 04/06/20  1748   CULT Culture negative monitoring continues Light growth  Normal oma    Light growth  Stenotrophomonas maltophilia  *  Light growth  Strain 2  Stenotrophomonas maltophilia  * Canceled, Test credited  >10 Squamous epithelial cells/low power field indicates oral contamination. Please   recollect.  *  Notification of test cancellation was given to  JUAN ALBERTO COON RN 01/26/21 [address] [place] [address].   No growth No growth Heavy growth  Normal oma   No growth  No growth       Attestation:  Total time on the floor involved in the patient's care: 35 minutes. Total time spent in counseling/care coordination: >50%

## 2021-02-01 NOTE — PROGRESS NOTES
CLINICAL NUTRITION SERVICES  -  ASSESSMENT NOTE      Recommendations Ordered by Registered Dietitian (RD):   -Continue boost chocolate shakes with meals.   Malnutrition:   % Weight Loss:  > 2% in 1 week (severe malnutrition)  % Intake:  </= 50% for >/= 5 days (severe malnutrition)  Subcutaneous Fat Loss:  Orbital region moderate depletion and Upper arm region mild depletion  Muscle Loss:  Temporal region moderate depletion, Dorsal hand region mild depletion and Posterior calf region mild depletion  Fluid Retention:  None noted    Malnutrition Diagnosis: Severe malnutrition  In Context of:  Chronic illness or disease      REASON FOR ASSESSMENT  Jah Tate is a 76 year old male seen by Registered Dietitian for [facility]    PMH:  End stage COPD (5 liters of oxygen at home), cavitary lesion of the lungs (hx of chronic cough), he continues to smoke half pack per day, received his COVID vaccine the week before he was admitted, hx of dementia and gout.  Admitted with worsening shortness of breath.    NUTRITION HISTORY  Information obtained from patient:  -Eats toast for breakfast.  -Peanut butter sandwich for lunch.  -Snacks on peanuts and fruit throughout the day.  -Reported eating one main meal during the day with wife (usually dinner).    CURRENT NUTRITION ORDERS  Diet Order:     2000 mg Sodium   Boost shake with meals    Current Intake/Tolerance:  Per patient:   -enjoyed cornflakes with milk for breakfast this morning, ate 100%  -Eating overall about 50% less than what he used to at home.  -Reports appetite is good.  -Pt states he drinks 100% of chocolate boost shakes (he received 3 today, each containing 375 kcal and 10 gm PRO).    NUTRITION FOCUSED PHYSICAL ASSESSMENT FOR DIAGNOSING MALNUTRITION)  Yes           Observed:    Muscle wasting (refer to documentation in Malnutrition section) and Subcutaneous fat loss (refer to documentation in Malnutrition section)    Obtained from Chart/Interdisciplinary Team:  Last  BM: 1/31 x 5    ANTHROPOMETRICS  Height: 180.3 cm (5' 11'')  Weight: 162 lbs 4.8 oz (73.6 lb)- most recent wt  Body mass index is 22.64 kg/m .  Weight Status:  Normal BMI  IBW: 75.3 kg  % IBW: 97%  Weight History: 2 kg wt loss in 5 days (2.5%)   02/01/21 0307 73.6 kg (162 lb 4.8 oz)   01/31/21 0507 73.5 kg (162 lb)   01/30/21 0600 72.5 kg (159 lb 12.8 oz)   01/30/21 0555 73.6 kg (162 lb 4.8 oz)   01/29/21 0651 74.4 kg (164 lb 1.6 oz)   01/27/21 0400 75.5 kg (166 lb 7.2 oz)   01/26/21 0353 73 kg (160 lb 15 oz)   01/25/21 0942 78.9 kg (174 lb)     07/01/20 59.7 kg (131 lb 9.8 oz)   04/06/20 72.1 kg (158 lb 15.2 oz)     LABS  Labs reviewed  Creatinine 0.59 (L), phos 2.4 (L) 1/28, hemoglobin 10.5 (L)    MEDICATIONS  Medications reviewed  Folvite, musinex    ASSESSED NUTRITION NEEDS PER APPROVED PRACTICE GUIDELINES:    Dosing Weight 73.6 kg- current wt  Estimated Energy Needs: 2,200-2,600 kcals (30-35 Kcal/Kg)  Justification: repletion  Estimated Protein Needs:  grams protein (1.2-1.5 g pro/Kg)  Justification: Repletion  Estimated Fluid Needs (1 mL/Kcal)  Justification: maintenance    MALNUTRITION:  % Weight Loss:  > 2% in 1 week (severe malnutrition)  % Intake:  </= 50% for >/= 5 days (severe malnutrition)  Subcutaneous Fat Loss:  Orbital region moderate depletion and Upper arm region mild depletion  Muscle Loss:  Temporal region moderate depletion, Dorsal hand region mild depletion and Posterior calf region mild depletion  Fluid Retention:  None noted    Malnutrition Diagnosis: Severe malnutrition  In Context of:  Chronic illness or disease    NUTRITION DIAGNOSIS:  Inadequate oral intake related to decreased appetite suspected due to COPD and shortness of breath as evidenced by 2.5% wt loss in 5 days and pt ordering only 1 small meal per day, not counting ONS at meal times.    NUTRITION INTERVENTIONS  Recommendations / Nutrition Prescription  -Continue boost chocolate shakes with  meals.    Implementation  Nutrition education: No education needs assessed at this time  Medical Food Supplement    Nutrition Goals  Eat 50-75% of meals BID + 3 boost shakes/day.    MONITORING AND EVALUATION:  Progress towards goals will be monitored and evaluated per protocol and Practice Guidelines    Anabela Frank  Dietetic Intern

## 2021-02-01 NOTE — PROGRESS NOTES
[facility]    Medicine Progress Note - Hospitalist Service       Date of Admission:  1/25/2021  Assessment & Plan        Jah Tate is a 76 year old male with end-stage COPD on 5 L of oxygen at home presents to the emergency department with worsening shortness of breath.  CT of the chest indicates cavitary lesion of the lungs.    Pneumonia of both lungs due to infectious organism, unspecified part of lung  Cavitary pneumonia  Masslike airspace opacity with concern for possible malignancy  End-stage COPD (FEV1 27%)  Left pleural effusion  Acute on chronic hypoxic respiratory failure    Patient is intermittently on 5 L of oxygen at home chronically, he continues to smoke half pack per day, currently presenting with 1 week history of shortness of breath, patient had received the COVID vaccine in the last week.    CT of the chest showed left lower lobe atelectatic cyst and a larger masslike airspace opacity in the lateral left upper lobe, which is new, there is also a spiculated airspace opacity in the right middle lobe.  There is associated hilar and mediastinal adenopathy.    Patient has history of chronic cough, currently he has a cavitary lesion in the left side of the lungs, the differential diagnosis includes cavitary pneumonia versus malignancy, tuberculosis was also a differential.Initially was placed on BiPAP in the ER and admitted to the ICU, subsequently weaned down to 5 lpm of supplemental oxygen by nasal cannula and transferred out of the ICU on 1/27/21.    QuantiFERON gold negative.Influenza and COVID-19 PCR negative on 1/25/21.    Infectious disease and pulmonology consulted, appreciate their assistance.    Continue cefepime and flagyl per ID. Vancomycin discontinued as nasal MRSA screen was negative.  Flagyl stopped 2/1    Continue PTA Breo Ellipta and Incruse Ellipta. PRN albuterol available.  As needed Mucinex ordered.Continue steroids, switch to p.o. prednisone 60  mg, 1/30    Respiratory status deteriorated on 1/28/21. RRT called, see notes for details. Improved after he was placed on BiPAP. [place] [facility] [place]  Patient is currently off of  BiPAP and is on nasal cannula O2, still continues to need 5 L of oxygen.  Plan to wean for SPO2 88% .    S/p thoracentesis on 1/28/21 with 900 ml of fluid removed. Culture negative to date. Cytology does not show any malignancy.   cultures growing Stenotrophomonas maltophilia, colonization versus infection.  White count is currently back to normal.    Patient was transferred to medical floor on telemetry 1/30.  Patient further decompensated on 1/30 for which he was placed on BiPAP and [place] [facility]  This was discussed in detail with the patient's daughter, recommended not to pursue intubation if it comes to that, she is still thinking about it.    Need to decide on the antibiotic duration.  Appreciate infectious disease input, plan to stop date 2/1, currently only on cefepime, he has completed 8 days so far.    Patient is currently doing poorly, he is always anxious and is asking for nebulizations and when given he tried to push it away or does not complete.  Stop the best to therapy today, I do not see any improvement in his symptoms with that.    Goals of care    Appreciate palliative care input, discussed with the palliative care, current plan is to transition patient to DNR/DNI and not escalate care in case he is not able to be managed on BiPAP here.  Plan for comfort care depends on his progress.    Abdominal aortic aneurysm    CT study on 1/25/21 showed an abdominal aortic aneurysm measuring 5.8 cm compared to 3.57 cm from 8/2/2013.    Vascular surgery consulted, appreciate their assistance.    No acute intervention recommended at this time due to his co-morbidities. Likely plan for outpatient follow-up when/if he recovers from his pulmonary issues.    Chronic gout of multiple sites    Currently asymptomatic.    Moderate  Dementia     Answers orientation questions appropriately, but seems forgetful at times.    Daughter confirms diagnosis of dementia and states he is forgetful but has been able to live independently with his wife.    Patient has significant memory issues, he keeps on asking the same question again and again.    Bleeding from scrotum    Pinpoint area of bleeding on left scrotum on 1/26/21.    Resolved with application of pressure by nursing.     Diet: 2 Gram Sodium Diet  Snacks/Supplements Adult: Boost Shake; With Meals    DVT Prophylaxis: Pneumatic Compression Devices and restart lovenox  Cope Catheter: not present  Code Status: No CPR- Do NOT Intubate           Disposition Plan   Expected discharge: Will assess patient for next 24 hours, if he is stable and antibiotics can be stopped possibly can transfer to TCU.  Will request PT/OT/social work consult  Entered: Jazmine Lewis MD 02/01/2021, 1:24 PM     Total time 40 minutes  Patient's care was discussed with nursing, RT and palliative care.  Jazmine Lewis MD  Hospitalist Service  [facility]  Contact information available via [facility] Paging/Directory    ______________________________________________________________________    Interval History   Visited with patient today, earlier he had refused duo nebs, discussed with the nursing and RT, he had tried to ask for nebs earlier, when given he tried to refuse it, he appears to be confused, he does answer questions but he keeps on asking the same question again and again.  Still on 5 to 6 L of oxygen, baseline he needs 5 L of oxygen PTA.    Data reviewed today: I reviewed all medications, new labs and imaging results over the last 24 hours. I personally reviewed no images or EKG's today.    Physical Exam   Vital Signs: Temp: 97.5  F (36.4  C) Temp src: Oral BP: 138/73 Pulse: 88   Resp: 16 SpO2: 91 % O2 Device: Nasal cannula Oxygen Delivery: 5 LPM  Weight: 162 lbs 4.8 oz  Constitutional: awake,  alert, cooperative, laying in bed, frail  Respiratory: coarse, diminished at the bases, coarse crackles heard bilaterally  Cardiovascular: regular rate and rhythm, normal S1 and S2, no murmur noted  GI: normal bowel sounds, soft, non-distended, non-tender  Skin: warm, dry  Musculoskeletal: no lower extremity pitting edema present  Neurologic: awake, alert, answers some questions appropriately but also appears confused at times during the same conversation    Data   Recent Labs   Lab 02/01/21  0552 01/31/21  0613 01/30/21  0551 01/29/21  0601 01/28/21  1129 01/28/21  0800   WBC 11.4*  --  9.8 11.3*  --  22.3*   HGB 10.5*  --  9.9* 9.9*  --  11.1*   MCV 89  --  90 92  --  92    261 282 238  --  285   NA  --   --  138 141 139 141   POTASSIUM  --   --  4.1 4.7 4.4 4.2   CHLORIDE  --   --  100 106 105 107   CO2  --   --  35* 32 28 33*   BUN  --   --  29 24 19 19   CR  --  0.59* 0.66 0.57* 0.54* 0.63*   ANIONGAP  --   --  3 3 6 1*   SHEEBA  --   --  8.4* 8.3* 8.5 8.5   GLC  --   --  141* 143* 143* 116*   PROTTOTAL  --   --   --   --   --  6.3*     Medications     - MEDICATION INSTRUCTIONS -       - MEDICATION INSTRUCTIONS -       - MEDICATION INSTRUCTIONS -         acetylcysteine  2 mL Nebulization 4x Daily     ceFEPIme (MAXIPIME) IV  2 g Intravenous Q8H     enoxaparin ANTICOAGULANT  40 mg Subcutaneous Q24H     fluticasone-vilanterol  1 puff Inhalation Daily     folic acid  1 mg Oral Daily     guaiFENesin  600 mg Oral BID     ipratropium - albuterol 0.5 mg/2.5 mg/3 mL  3 mL Nebulization 4x daily     metroNIDAZOLE  500 mg Intravenous Q12H     predniSONE  60 mg Oral Daily     sodium chloride 0.9 %  100 mL Intravenous Once     umeclidinium  1 puff Inhalation Daily

## 2021-02-02 LAB
BACTERIA SPEC CULT: NO GROWTH
INTERPRETATION ECG - MUSE: NORMAL
SPECIMEN SOURCE: NORMAL

## 2021-02-02 PROCEDURE — 250N000009 HC RX 250: Performed by: NURSE PRACTITIONER

## 2021-02-02 PROCEDURE — 250N000012 HC RX MED GY IP 250 OP 636 PS 637: Performed by: INTERNAL MEDICINE

## 2021-02-02 PROCEDURE — 99233 SBSQ HOSP IP/OBS HIGH 50: CPT | Performed by: NURSE PRACTITIONER

## 2021-02-02 PROCEDURE — 99207 PR NOT IN PERSON INPATIENT CONSULT STATISTICAL MARKER: CPT | Performed by: NURSE PRACTITIONER

## 2021-02-02 PROCEDURE — 250N000013 HC RX MED GY IP 250 OP 250 PS 637: Performed by: NURSE PRACTITIONER

## 2021-02-02 PROCEDURE — 250N000011 HC RX IP 250 OP 636: Performed by: HOSPITALIST

## 2021-02-02 PROCEDURE — 999N000157 HC STATISTIC RCP TIME EA 10 MIN

## 2021-02-02 PROCEDURE — 210N000002 HC R&B HEART CARE

## 2021-02-02 PROCEDURE — 250N000013 HC RX MED GY IP 250 OP 250 PS 637: Performed by: HOSPITALIST

## 2021-02-02 PROCEDURE — 99233 SBSQ HOSP IP/OBS HIGH 50: CPT | Performed by: INTERNAL MEDICINE

## 2021-02-02 PROCEDURE — 94640 AIRWAY INHALATION TREATMENT: CPT

## 2021-02-02 RX ORDER — MORPHINE SULFATE 2 MG/ML
1-2 INJECTION, SOLUTION INTRAMUSCULAR; INTRAVENOUS
Status: DISCONTINUED | OUTPATIENT
Start: 2021-02-02 | End: 2021-02-05 | Stop reason: HOSPADM

## 2021-02-02 RX ORDER — ATROPINE SULFATE 10 MG/ML
1-2 SOLUTION/ DROPS OPHTHALMIC
Status: DISCONTINUED | OUTPATIENT
Start: 2021-02-02 | End: 2021-02-05 | Stop reason: HOSPADM

## 2021-02-02 RX ORDER — HALOPERIDOL 2 MG/ML
.5-1 SOLUTION ORAL EVERY 6 HOURS PRN
Status: DISCONTINUED | OUTPATIENT
Start: 2021-02-02 | End: 2021-02-05 | Stop reason: HOSPADM

## 2021-02-02 RX ORDER — GLYCOPYRROLATE 0.2 MG/ML
.2-.4 INJECTION, SOLUTION INTRAMUSCULAR; INTRAVENOUS EVERY 4 HOURS PRN
Status: DISCONTINUED | OUTPATIENT
Start: 2021-02-02 | End: 2021-02-05 | Stop reason: HOSPADM

## 2021-02-02 RX ORDER — LORAZEPAM 2 MG/ML
.5-1 CONCENTRATE ORAL
Status: DISCONTINUED | OUTPATIENT
Start: 2021-02-02 | End: 2021-02-05 | Stop reason: HOSPADM

## 2021-02-02 RX ORDER — MORPHINE SULFATE 100 MG/5ML
5-10 SOLUTION ORAL
Status: DISCONTINUED | OUTPATIENT
Start: 2021-02-02 | End: 2021-02-05 | Stop reason: HOSPADM

## 2021-02-02 RX ORDER — LORAZEPAM 2 MG/ML
.5-1 INJECTION INTRAMUSCULAR
Status: DISCONTINUED | OUTPATIENT
Start: 2021-02-02 | End: 2021-02-05 | Stop reason: HOSPADM

## 2021-02-02 RX ADMIN — CEFEPIME HYDROCHLORIDE 2 G: 2 INJECTION, POWDER, FOR SOLUTION INTRAVENOUS at 02:50

## 2021-02-02 RX ADMIN — FLUTICASONE FUROATE AND VILANTEROL TRIFENATATE 1 PUFF: 200; 25 POWDER RESPIRATORY (INHALATION) at 08:57

## 2021-02-02 RX ADMIN — FOLIC ACID 1 MG: 1 TABLET ORAL at 08:57

## 2021-02-02 RX ADMIN — CEFEPIME HYDROCHLORIDE 2 G: 2 INJECTION, POWDER, FOR SOLUTION INTRAVENOUS at 09:01

## 2021-02-02 RX ADMIN — IPRATROPIUM BROMIDE AND ALBUTEROL SULFATE 3 ML: .5; 3 SOLUTION RESPIRATORY (INHALATION) at 07:26

## 2021-02-02 RX ADMIN — GUAIFENESIN 600 MG: 600 TABLET, EXTENDED RELEASE ORAL at 08:57

## 2021-02-02 RX ADMIN — ACETYLCYSTEINE 2 ML: 200 SOLUTION ORAL; RESPIRATORY (INHALATION) at 07:26

## 2021-02-02 RX ADMIN — PREDNISONE 60 MG: 20 TABLET ORAL at 08:57

## 2021-02-02 RX ADMIN — GUAIFENESIN 600 MG: 600 TABLET, EXTENDED RELEASE ORAL at 21:14

## 2021-02-02 ASSESSMENT — ACTIVITIES OF DAILY LIVING (ADL)
ADLS_ACUITY_SCORE: 21

## 2021-02-02 NOTE — PROGRESS NOTES
[facility]    Infectious Disease Progress Note    Date of Service (when I saw the patient): 02/02/2021     Assessment & Plan   Jah Tate is a 76 year old male who was admitted on 1/25/2021.     Impression:  1. 76 y.o male with end stage COPD, on 5 L of oxygen at home.   2. Current every day smoker.   3. Presenting with 1 week history of shortness of breath, patient had received Covid vaccine in the last week.  4. CT of the chest shows left lower lobe atelectatic cyst and a larger masslike airspace opacity in the lateral left upper lobe, which is new, there is also a spiculated airspace opacity in the right middle lobe.  There is associated hilar and mediastinal adenopathy.  5. Absolutely no risk factors for TB, never travelled out of [place] per patient, no dwelling in any shelter, jailhouse or retirement. Quantiferon TB is negative, out of iso now.   7. PCN listed as allergy.        Recommendations:  Day 8+ cefepime and flagyl, GNR noted on the sputum is stento not covered but likely just colonization  MRSA PCR neg, vanco stopped   Leucocytosis on steroids.   Goal of care discussion    Stop ABX likely OK any time, will folow peripherally    Frank Farnsworth MD    Interval History   No fever   Quant tb is neg   Slightly more short of breath today   RRT again with hypoxia this WER    Physical Exam   Temp: 98.1  F (36.7  C) Temp src: Oral BP: 128/68 Pulse: 69   Resp: 16 SpO2: 95 % O2 Device: None (Room air) Oxygen Delivery: 5 LPM  Vitals:    01/31/21 0507 02/01/21 0307 02/02/21 0627   Weight: 73.5 kg (162 lb) 73.6 kg (162 lb 4.8 oz) 72.6 kg (160 lb 1.6 oz)     Vital Signs with Ranges  Temp:  [98.1  F (36.7  C)-98.5  F (36.9  C)] 98.1  F (36.7  C)  Pulse:  [] 69  Resp:  [16-18] 16  BP: (126-138)/(65-73) 128/68  SpO2:  [91 %-95 %] 95 %    Constitutional: Awake,  Lungs: using accessory muscles, coarse   Cardiovascular: Regular rate and rhythm, normal S1 and S2, and no murmur noted  Abdomen:  Normal bowel sounds, soft, non-distended, non-tender  Skin: No rashes, no cyanosis, no edema  Other:    Medications       fluticasone-vilanterol  1 puff Inhalation Daily     guaiFENesin  600 mg Oral BID     predniSONE  60 mg Oral Daily     sodium chloride 0.9 %  100 mL Intravenous Once     umeclidinium  1 puff Inhalation Daily       Data   All microbiology laboratory data reviewed.  Recent Labs   Lab Test 02/01/21  0552 01/31/21  0613 01/30/21  0551 01/29/21  0601   WBC 11.4*  --  9.8 11.3*   HGB 10.5*  --  9.9* 9.9*   HCT 33.5*  --  30.6* 32.0*   MCV 89  --  90 92    261 282 238     Recent Labs   Lab Test 01/31/21  0613 01/30/21  0551 01/29/21  0601   CR 0.59* 0.66 0.57*     No lab results found.  Recent Labs   Lab Test 01/28/21  1430 01/26/21  2130 01/26/21  1210 01/26/21  0442 01/25/21  1016 06/25/20  2000 04/06/20  1748   CULT No growth Light growth  Normal oma    Light growth  Stenotrophomonas maltophilia  *  Light growth  Strain 2  Stenotrophomonas maltophilia  * Canceled, Test credited  >10 Squamous epithelial cells/low power field indicates oral contamination. Please   recollect.  *  Notification of test cancellation was given to  JUAN ALBERTO COON RN 01/26/21 [address] [place] [address].   No growth No growth Heavy growth  Normal oma   No growth  No growth       Attestation:  Total time on the floor involved in the patient's care: 35 minutes. Total time spent in counseling/care coordination: >50%

## 2021-02-02 NOTE — PLAN OF CARE
"OT: per discussion with RN and chart review, patient refusing therapy- does not want therapy, \"not now, not ever\". Will complete orders and cancel appointment.  "

## 2021-02-02 NOTE — PROGRESS NOTES
"SPIRITUAL HEALTH SERVICES Progress Note  [facility]/Palliative     Return visit to pt, per request from yesterday. I re-introduced to pt and he invited me to stay. Nursing staff was present during the early part of the visit.    Pt seemed confused and often asked \"how did I get here and why am I still here?\" Nurse told him about his COPD status and he indicated he understood this. Jah told me that he is \"fine today\" and often looked out the window. I offered to read from a [employer] book and Jah agreed. I read some short writings from the [employer] devotional book and Jah expressed appreciation. Pt told me that he has a daughter, calling her \"my Stephanie\" and his wife, named Archana. I sat quietly with pt for several minutes and then offered words of our care for pt and his family and he thanked me for visit. When asked if there was anything he needed, he said he would like the nurse to bring him medication. I stopped by nursing station to let them know.    SHS remains available.      Padmini Edgar  Chaplain Resident    "

## 2021-02-02 NOTE — PLAN OF CARE
Transitioned today to comfort care status. Pt has had no complaints today, breathing appears comfortable, only minimal SOB during turns. PRNs are available if needed. Poor appetite, encouraged him to order anything that would taste good. Likes staff at bedside but usually doesn't have any requests. Occasionally yells out when he feels lonely. Encouraged him to call his daughter or other family members several times but he declined. SH and SW involved. Very forgetful, several reminders on situation/time/place today. Incontinent. Has declined much turning/repo, but RN has insisted on incontinence care-not appropriate for external catheter related to anatomy.

## 2021-02-02 NOTE — PROGRESS NOTES
[facility]    Medicine Progress Note - Hospitalist Service       Date of Admission:  1/25/2021  Assessment & Plan     Jah Tate is a 76 year old male with end-stage COPD on 5 L of oxygen at home presents to the emergency department with worsening shortness of breath.  CT of the chest indicates cavitary lesion of the lungs.  Comfort care only status since 2/2/2021  Pneumonia of both lungs due to infectious organism, unspecified part of lung  Cavitary pneumonia  Masslike airspace opacity with concern for possible malignancy  End-stage COPD (FEV1 27%)  Left pleural effusion  Acute on chronic hypoxic respiratory failure    Patient is intermittently on 5 L of oxygen at home chronically, he continues to smoke half pack per day, currently presenting with 1 week history of shortness of breath, patient had received the COVID vaccine in the last week.    CT of the chest showed left lower lobe atelectatic cyst and a larger masslike airspace opacity in the lateral left upper lobe, which is new, there is also a spiculated airspace opacity in the right middle lobe.  There is associated hilar and mediastinal adenopathy.    Patient has history of chronic cough, currently he has a cavitary lesion in the left side of the lungs, the differential diagnosis includes cavitary pneumonia versus malignancy, tuberculosis was also a differential.Initially was placed on BiPAP in the ER and admitted to the ICU, subsequently weaned down to 5 lpm of supplemental oxygen by nasal cannula and transferred out of the ICU on 1/27/21.    QuantiFERON gold negative.Influenza and COVID-19 PCR negative on 1/25/21.    Infectious disease and pulmonology consulted, appreciate their assistance.    Continue cefepime and flagyl per ID. Vancomycin discontinued as nasal MRSA screen was negative.  Flagyl stopped 2/1    Continue PTA Breo Ellipta and Incruse Ellipta. PRN albuterol available.  As needed Mucinex ordered.Continue  steroids, switch to p.o. prednisone 60 mg, 1/30    Respiratory status deteriorated on 1/28/21. RRT called, see notes for details. Improved after he was placed on BiPAP. [place] [facility] [place]  Patient is currently off of  BiPAP and is on nasal cannula O2, still continues to need 5 L of oxygen.  Plan to wean for SPO2 88% .    S/p thoracentesis on 1/28/21 with 900 ml of fluid removed. Culture negative to date. Cytology does not show any malignancy.   cultures growing Stenotrophomonas maltophilia, colonization versus infection.  White count is currently back to normal.    Patient was transferred to medical floor on telemetry 1/30.  Patient further decompensated on 1/30 for which he was placed on BiPAP and [place] [facility]  This was discussed in detail with the patient's daughter, recommended not to pursue intubation if it comes to that, she is still thinking about it.    Need to decide on the antibiotic duration.  Appreciate infectious disease input, plan to stop date 2/1, currently only on cefepime, he has completed 8 days so far.    We had a long discussion with the patient's family and patient, palliative care involved, his care was transitioned to comfort measures only on 2/2/2021.  Will need discharge to NH with  hospice.    Goals of care  Appreciate palliative care input, family has decided on comfort measures only status since 2/2/2021.  Abdominal aortic aneurysm    Chronic gout of multiple sites    Moderate Dementia       Bleeding from scrotum       Diet: 2 Gram Sodium Diet  Snacks/Supplements Adult: Boost Shake; With Meals    DVT Prophylaxis: Pneumatic Compression Devices and restart lovenox  Cope Catheter: not present  Code Status: No CPR- Do NOT Intubate           Disposition Plan   Expected discharge: Discharge to nursing home with hospice when bed available  Entered: Jazmine Lewis MD 02/02/2021, 12:21 PM     Total time 45 minutes included conversation with the patient's family regarding choosing  comfort measures versus ongoing care.    Jazmine Lewis MD  Hospitalist Service  [facility]  Contact information available via [facility] Paging/Directory    ______________________________________________________________________    Interval History   Patient is alert, resting comfortably, occasionally confused, on 5 L of oxygen, he had refused PT/OT yesterday and today morning, this was discussed with the patient's family, they are opting to comfort measures status.    Data reviewed today: I reviewed all medications, new labs and imaging results over the last 24 hours. I personally reviewed no images or EKG's today.    Physical Exam   Vital Signs: Temp: 98.1  F (36.7  C) Temp src: Oral BP: 128/68 Pulse: 69   Resp: 16 SpO2: 95 % O2 Device: None (Room air) Oxygen Delivery: 5 LPM  Weight: 160 lbs 1.6 oz  Constitutional: awake, alert, cooperative, laying in bed, frail  Respiratory: coarse, diminished at the bases, coarse crackles heard bilaterally  Cardiovascular: regular rate and rhythm, normal S1 and S2, no murmur noted  GI: normal bowel sounds, soft, non-distended, non-tender  Skin: warm, dry  Musculoskeletal: no lower extremity pitting edema present  Neurologic: awake, alert, answers some questions appropriately but also appears confused at times during the same conversation    Data   Recent Labs   Lab 02/01/21  0552 01/31/21  0613 01/30/21  0551 01/29/21  0601 01/28/21  1129 01/28/21  0800   WBC 11.4*  --  9.8 11.3*  --  22.3*   HGB 10.5*  --  9.9* 9.9*  --  11.1*   MCV 89  --  90 92  --  92    261 282 238  --  285   NA  --   --  138 141 139 141   POTASSIUM  --   --  4.1 4.7 4.4 4.2   CHLORIDE  --   --  100 106 105 107   CO2  --   --  35* 32 28 33*   BUN  --   --  29 24 19 19   CR  --  0.59* 0.66 0.57* 0.54* 0.63*   ANIONGAP  --   --  3 3 6 1*   SHEEBA  --   --  8.4* 8.3* 8.5 8.5   GLC  --   --  141* 143* 143* 116*   PROTTOTAL  --   --   --   --   --  6.3*     Medications        fluticasone-vilanterol  1 puff Inhalation Daily     guaiFENesin  600 mg Oral BID     predniSONE  60 mg Oral Daily     sodium chloride 0.9 %  100 mL Intravenous Once     umeclidinium  1 puff Inhalation Daily

## 2021-02-02 NOTE — PROGRESS NOTES
[facility]  Palliative Care Daily Progress Note       Recommendations & Counseling       Pt agreeable to transitioning to comfort measures and hospice     Will discontinue IV abx, but continue PO steroids, bronchodilators, PRN nebulizers for comfort    No further bipap or scheduled nebulizers at this point     Morphine high concentration 5-10mg SL every 2hrs PRN or 1-2mg IV Q1hr PRN pain, SOB     Ativan 0.5-1mg SL every 3hrs PRN or IV Q1hr PRN anxiety    Haldol 0.5-1mg SL every 6hrs PRN agitation     Atropine, robinul PRN secretions     Dtr updated via phone and in complete agreement with comfort plan of care     SW consult for hospice, will look into doing hospice at pt's custodial. Dtr notified that given pt's tenuous respiratory condition, it is possible he could decompensate in the hospital and may not be able to safely discharge from the hospital with hospice. Will pursue dispo planning today, given his clinical stability at least for today      Case was reviewed with bedside RN Nubia, ADIN left for unit SILVIO Ray, notified Dr. Lewis of plan of care.    JULIA Gutierrez CNP  [facility]  Contact information available via [facility] Paging/Directory      Thank you for the opportunity to participate in the care of this patient and family. Our team: will continue to follow.     During regular M-F work hours ([id number]) -- if you are not sure who specifically to contact -- please contact us in [facility] Smart Web.     After regular work hours and on weekends/holidays, you can call our answering service at [phone].     Attestation:  Total time involved in the patient's care: 45 minutes  Total time spent in counseling/care coordination: >50%     Assessments          Jah Tate is a 76 year old male with PMH significant for severe end stage COPD on chronic 5L O2 at home, AAA, gout, and mild-moderate dementia who presents with acute on chronic respiratory failure 2/2  bilateral PNA. Pulmonary and ID following. Suspect infectious etiology, on broad spectrum abx. Cavitary lesions possibly could represent malignancy. S/p left thoracentesis 1/28 with 900cc removed. The last few days have brought a rather tenuous respiratory condition, requiring intermittent episodes on bipap, yet pt's compliance and tolerance with bipap has been poor.     Today, the patient was seen for:  Goals of care    Visited with Jah via video conference. He is seen resting in bed. He reports that his breathing is comfortable at present. He had an ok night last night. He believes that he dodged a bullet from yesterday, and is a little out of the woods today.     He and I acknowledge that with his chronic lung disease, he appears to be very end stage. Meaning his reserves are very limited, and anything can tip him off the edge at any point. He is largely bedbound at this point, and I believe his tolerance for activity moving forward is going to be completely limited.     I express concern how how he could rehabilitate from here, or even be able to safely return home. He is concerned as well. We acknowledge that at some point, he will die from this condition.     We thus talk about a way to support his comfort in the bed/chair, and allow him to fulfill his days the way he would like to. I recommend shifting our focus of care to comfort and alleviating symptoms (he is getting relief from PRN doses of morphine, although still using sparingly).     We discuss the hospice plan of care and philosophy. We acknowledge that focus will be on sx relief, and less emphasis on nebulizers, bipap, hospitalizations (all of which he detests). More emphasis will be on managing sx so that he can read and watch TV, which is generally enjoyable/relaxing for him, as well as how he typically spends his days.     Jah voices agreement. We talk about starting comfort cares in the hospital, which will free him up from not needing to  use bipap again if he decompensates. We also discuss the visitor policy with comfort measures, and note that his wife Archana will now be able to visit.     I spoke with his dtr Padmini via phone. I updated her on last night's events and my conversation with Jah today. She was surprised, but relieved that we were able to have these difficult conversations and come up with a plan.    We discuss hospice planning with the unit SW. We note that pt could still decompensate at any point, which may ultimately affect his ability to safely discharge from the hospital.     Prognosis, Goals, or Advance Care Planning was addressed today with: Yes.  Mood, coping, and/or meaning in the context of serious illness were addressed today: Yes.            Interval History:     Chart review/discussion with unit or clinical team members:   Breathing generally stable yesterday afternoon/evening and overnight. 1 dose of morphine used with good effect. Otherwise, incontinent of urine and stool, total cares, bed bound.      Per patient or family/caregivers today:  Breathing is ok today. Able to read and watch TV.      Key Palliative Symptoms:  # Pain severity the last 12 hours: none  # Dyspnea severity the last 12 hours: moderate  # Nausea severity the last 12 hours: none  # Anxiety severity the last 12 hours: moderate    Patient is on opioids: bowels not assessed today.           Review of Systems:     Besides above, an additional N/A system ROS was reviewed and is unremarkable          Medications:     I have reviewed this patient's medication profile and medications during this hospitalization.    Noted meds:    Mucinex 600mg PO BID   Prednisone 60mg PO daily    Morphine high concentration 5-10mg SL every 2hrs PRN or 1-2mg IV Q1hr PRN pain, SOB   Ativan 0.5-1mg SL every 3hrs PRN or IV Q1hr PRN anxiety  Haldol 0.5-1mg SL every 6hrs PRN agitation   Atropine, robinul PRN secretions            Physical Exam:   Temp: 98.1  F (36.7  C) Temp src:  Oral BP: 128/68 Pulse: 69   Resp: 16 SpO2: 95 % O2 Device: None (Room air) Oxygen Delivery: 5 LPM   Assessed via video conference   CONSTITUTIONAL: Chronically ill man seen resting in bed in NAD. Calm and cooperative.  RESP: Occasional wet congested nonproductive cough  NEUROLOGIC: Appropriately responsive during interview  Remainder of physical exam per hospitalist, Dr. Lewis, note dated 2/2           Data Reviewed:     Recent imaging reviewed, my comments on pertinents:   Results for orders placed or performed during the hospital encounter of 01/25/21   XR Chest Port 1 View    Impression    IMPRESSION: There is masslike abnormal airspace opacity in the mid  left lung that is new compared to the prior study and associated with  a small left pleural effusion. There is also a trace right pleural  effusion. Findings are suggestive of pneumonia with parapneumonic  effusion. Continued radiographic follow-up until complete resolution  is recommended.    ORLANDO KRUSE MD   CT Chest Pulmonary Embolism w Contrast    Impression    IMPRESSION:  1.  Left lower lobe atelectasis was present on the prior study and is  not significantly changed. Large masslike airspace opacity in the  lateral left upper lobe is new and likely infectious given its rapid  development. The spiculated airspace opacity in the right middle lobe  is also completely new compared to the prior study and more likely to  represent infection. Hilar and mediastinal adenopathy is assumed to be  reactive. The possibility of aggressive malignancy cannot be entirely  excluded. Consider bronchoscopy.  2.  Infrarenal abdominal aortic aneurysm measures 5.8 cm compared to  3.7 cm on 8/2/2013. Vascular surgery referral recommended.    ORLANDO KRUSE MD   CTA Abdomen Pelvis with Contrast    Impression    IMPRESSION:  1. Aneurysmal dilatation of the abdominal aorta measuring up to 5.4 x  6.0 cm, previously 3.7 cm.  2. Moderate left pleural effusion with associated  atelectasis.  3. Diverticulosis without evidence of diverticulitis.    LINDA FIGUEROA,    XR Chest Port 1 View    Impression    IMPRESSION: Interval increase in density involving the left lung most compatible with increasing pneumonia with interstitial component. More focal dense component along the lateral aspect left perihilar region remain stable. Mild atelectasis medial   aspect right lung base with prominence of the right hilar region related to the focal soft tissue density in this area on the CT. Mild cardiac enlargement. Normal pulmonary vascularity.   US Thoracentesis    Impression    IMPRESSION: Technically successful thoracentesis without immediate  complications.    BALTAZAR REESE MD   US Lower Extremity Venous Duplex Bilateral    Impression    IMPRESSION: Negative for deep venous thrombosis in both lower  extremities.    DADA GARCIA MD   XR Chest Port 1 View    Impression    IMPRESSION: Improved left-sided infiltrates compared to previous.  Improved right base infiltrate.    BALTAZAR REESE MD       Recent lab data reviewed, my comments on pertinents:   Na 138  K 4.1  Creat 0.59  WBC 11.4  Hgb 10.5  Plt 251

## 2021-02-02 NOTE — PLAN OF CARE
Patient alert, refuses turns.Some incontinence. O2 at 5L, c/o increased SOB last evening, anxious, calling out. Given morphine and scheduled nebs, with improvement. LS mostly clear. Loose cough and coughs up sputum, using Yankauer to suction out. Tele SR, VSS. IV Merrem. Review POC with patient.

## 2021-02-03 PROCEDURE — 250N000013 HC RX MED GY IP 250 OP 250 PS 637: Performed by: HOSPITALIST

## 2021-02-03 PROCEDURE — 99233 SBSQ HOSP IP/OBS HIGH 50: CPT | Performed by: NURSE PRACTITIONER

## 2021-02-03 PROCEDURE — 210N000002 HC R&B HEART CARE

## 2021-02-03 PROCEDURE — 250N000013 HC RX MED GY IP 250 OP 250 PS 637: Performed by: NURSE PRACTITIONER

## 2021-02-03 PROCEDURE — 99232 SBSQ HOSP IP/OBS MODERATE 35: CPT | Performed by: HOSPITALIST

## 2021-02-03 PROCEDURE — 250N000012 HC RX MED GY IP 250 OP 636 PS 637: Performed by: INTERNAL MEDICINE

## 2021-02-03 RX ADMIN — GUAIFENESIN 600 MG: 600 TABLET, EXTENDED RELEASE ORAL at 20:22

## 2021-02-03 RX ADMIN — GUAIFENESIN 600 MG: 600 TABLET, EXTENDED RELEASE ORAL at 08:07

## 2021-02-03 RX ADMIN — FLUTICASONE FUROATE AND VILANTEROL TRIFENATATE 1 PUFF: 200; 25 POWDER RESPIRATORY (INHALATION) at 08:09

## 2021-02-03 RX ADMIN — UMECLIDINIUM 1 PUFF: 62.5 AEROSOL, POWDER ORAL at 08:08

## 2021-02-03 RX ADMIN — PREDNISONE 60 MG: 20 TABLET ORAL at 08:07

## 2021-02-03 ASSESSMENT — ACTIVITIES OF DAILY LIVING (ADL)
ADLS_ACUITY_SCORE: 22
ADLS_ACUITY_SCORE: 21
ADLS_ACUITY_SCORE: 22

## 2021-02-03 NOTE — PROGRESS NOTES
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  CT of the chest indicated a cavitary lesion of the lungs.    Comfort cares only starting on 2/2/2021  Pneumonia of both lungs due to infectious organism, unspecified part of lung  Cavitary pneumonia  Masslike airspace opacity with concern for possible malignancy  End-stage COPD (FEV1 27%)  Left pleural effusion  Acute on chronic hypoxic respiratory failure  Abdominal aortic aneurysm  Chronic gout of multiple sites  Moderate Dementia   Bleeding from scrotum  Severe malnutrition in the context of chronic illness or disease    Patient is intermittently on 5 L of oxygen at home chronically, he continues to smoke half pack per day, currently presenting with 1 week history of shortness of breath, patient had received the COVID vaccine in the last week.    CT of the chest showed left lower lobe atelectatic cyst and a larger masslike airspace opacity in the lateral left upper lobe, which is new, there is also a spiculated airspace opacity in the right middle lobe.  There is associated hilar and mediastinal adenopathy.    Patient has history of chronic cough, currently he has a cavitary lesion in the left side of the lungs, the differential diagnosis includes cavitary pneumonia versus malignancy, tuberculosis was also a differential.Initially was placed on BiPAP in the ER and admitted to the ICU, subsequently weaned down to 5 lpm of supplemental oxygen by nasal cannula and transferred out of the ICU on 1/27/21.    QuantiFERON gold negative.Influenza and COVID-19 PCR negative on 1/25/21.    Infectious disease and pulmonology consulted, appreciate their assistance.    Treated with cefepime, flagyl, and vancomycin. All  antibiotics discontinued on 2/2/21 with transition to comfort cares.    Continue PTA Breo Ellipta and Incruse Ellipta. PRN albuterol available.  As needed Mucinex ordered. Continue steroids, switch to p.o. prednisone 60 mg, 1/30    Respiratory status deteriorated on 1/28/21. RRT called, see notes for details. Improved after he was placed on BiPAP. Transferred to [facility] status.  Patient is currently off of  BiPAP and is on nasal cannula O2, still continues to need 5 L of oxygen.  Plan to wean for SPO2 88% .    S/p thoracentesis on 1/28/21 with 900 ml of fluid removed. Culture negative to date. Cytology does not show any malignancy.   cultures growing Stenotrophomonas maltophilia, colonization versus infection.    Patient was transferred to medical floor on telemetry 1/30.  Patient further decompensated on 1/30 for which he was placed on BiPAP and transferred to [facility].  This was discussed in detail with the patient's daughter, recommended not to pursue intubation if it comes to that.    Palliative care consulted.    After multiple conversations, patient ultimately decided to transition to comfort cares on 2/2/21.    Continue comfort cares.    Continue inhalers, steroids, and oxygen for comfort.    Awaiting placement for end of life cares, discussed with social work.     Diet: Regular Diet Adult  Snacks/Supplements Adult: Boost Shake; Between Meals    DVT Prophylaxis: None, comfort cares  Cope Catheter: not present  Code Status: No CPR- Do NOT Intubate           Disposition Plan   Expected discharge: Likely discharge with hospice soon, discussed with social work  Entered: Varun Edwards MD 02/03/2021, 1:21 PM       The patient's care was discussed with the Bedside Nurse, Care Coordinator/ and Patient.    Varun Edwards MD  Hospitalist Service  [facility]  Contact information available via [facility]  [place]/Directory    ______________________________________________________________________    Interval History   Jah Tate feels better today. Doesn't feel short of breath at the moment. Denies fevers, chest pain, nausea, abdominal pain. Discussed plan of care, he confirms his decision to transition to comfort cares/hospice.    Data reviewed today: I reviewed all medications, new labs and imaging results over the last 24 hours. I personally reviewed no images or EKG's today.    Physical Exam   Vital Signs:           Resp: 20     Oxygen Delivery: 5 LPM  Weight: 164 lbs 3.2 oz  Constitutional: awake, alert, cooperative, no apparent distress, laying in bed  Respiratory: coarse breath sounds bilaterally, diminished at the bases  Cardiovascular: regular rate and rhythm, normal S1 and S2, no murmur noted  GI: normal bowel sounds, soft, non-distended, non-tender  Skin: warm, dry  Musculoskeletal: no lower extremity pitting edema present  Neurologic: awake, alert, answers questions appropriately    Data   Recent Labs   Lab 02/01/21  0552 01/31/21  0613 01/30/21  0551 01/29/21  0601 01/28/21  1129 01/28/21  0800   WBC 11.4*  --  9.8 11.3*  --  22.3*   HGB 10.5*  --  9.9* 9.9*  --  11.1*   MCV 89  --  90 92  --  92    261 282 238  --  285   NA  --   --  138 141 139 141   POTASSIUM  --   --  4.1 4.7 4.4 4.2   CHLORIDE  --   --  100 106 105 107   CO2  --   --  35* 32 28 33*   BUN  --   --  29 24 19 19   CR  --  0.59* 0.66 0.57* 0.54* 0.63*   ANIONGAP  --   --  3 3 6 1*   SHEEBA  --   --  8.4* 8.3* 8.5 8.5   GLC  --   --  141* 143* 143* 116*   PROTTOTAL  --   --   --   --   --  6.3*     Medications       fluticasone-vilanterol  1 puff Inhalation Daily     guaiFENesin  600 mg Oral BID     predniSONE  60 mg Oral Daily     sodium chloride 0.9 %  100 mL Intravenous Once     umeclidinium  1 puff Inhalation Daily

## 2021-02-03 NOTE — PLAN OF CARE
A&Ox4, denies pain or SOB. On comfort cares. Pt states he's uncomfortable, repositioned with relief. Pt able to make needs known. Plan to discharge home to assisted living, then start hospice.

## 2021-02-03 NOTE — CONSULTS
Care Management Initial Consult    General Information  Assessment completed with: Children, (daughter Padmini)  Type of CM/SW Visit: Initial Assessment    Primary Care Provider verified and updated as needed:     Readmission within the last 30 days:        Reason for Consult: discharge planning  Advance Care Planning: Advance Care Planning Reviewed: no concerns identified          Communication Assessment  Patient's communication style: spoken language (English or Bilingual)    Hearing Difficulty or Deaf: no   Wear Glasses or Blind: no    Cognitive  Cognitive/Neuro/Behavioral: .WDL except  Level of Consciousness: (forgetful)  Arousal Level: opens eyes spontaneously  Orientation: disoriented to, situation  Mood/Behavior: anxious  Best Language: 0 - No aphasia  Speech: slow    Living Environment:   People in home: spouse  (Archana)  Current living Arrangements: assisted living  Name of Facility: ([facility])   Able to return to prior arrangements:         Family/Social Support:  Care provided by: self, child(justin), other (see comments)  Provides care for: no one  Marital Status:   Wife, Children  (Archana)       Description of Support System: Supportive, Involved    Support Assessment: Adequate family and caregiver support    Current Resources:   Patient receiving home care services: No     Community Resources: None  Equipment currently used at home: none  Supplies currently used at home: Oxygen Tubing/Supplies    Employment/Financial:  Employment Status: retired        Financial Concerns: No concerns identified           Lifestyle & Psychosocial Needs:        Socioeconomic History     Marital status:      Spouse name: Not on file     Number of children: Not on file     Years of education: Not on file     Highest education level: Not on file     Tobacco Use     Smoking status: Current Every Day Smoker     Packs/day: 0.50     Years: 60.00     Pack years: 30.00     Types: Cigarettes     Smokeless tobacco:  Never Used   Substance and Sexual Activity     Alcohol use: No     Comment: 4-5 drinks per month     Drug use: No     Sexual activity: Yes       Functional Status:  Prior to admission patient needed assistance:              Mental Health Status:          Chemical Dependency Status:                Values/Beliefs:  Spiritual, Cultural Beliefs, Mormonism Practices, Values that affect care: no               Additional Information:  Per care management/social work consult for discharge planning and a hospice consult on discharge.  Patient was admitted on 1-25-21 with worsening shortness of breath.  The tentative date of discharge is yet to be determined.  Reviewed chart and call placed to patient's daughter Shraddha to discuss discharge plans.  Per patient's daughter's report, patient lives with his wife at [facility].  Patient uses 5 liters of home oxygen at baseline.  Explained that we have to work on a discharge plan if patient is medically stable to discharge and doesn't pass in the hospital.  Patient's daughter states she understands.  Patient's daughter is asking about having patient going back to [facility] in the Care Suites.  Explained that I will call the assisted living and I will get more information.  Patient's daughter is in agreement.  Call placed to [facility] and spoke with the nurse Mireya, [phone].  Mireya states that patient can return back to his apartment with hospice or he can go to the Care Suites.  The Care Suites is private pay, if he went back to his apartment there would be no extra cost unless they add services or pay for private pay homecare services.  Inquired as to how much the care suites are and Mireya was unable to tell me.  Explained that patient's daughter was currently at the facility and they can just update her as to the costs.  They also prefer to use [facility].  Call placed to update patient's daughter as to this information.  Explained  th above and also explained that SNF and residential hospice would be private pay.  Explained that room & board are not covered under insurance.  Explained that if he was to go home home hospice is not 24 hour care.  Explained to Padmini that I will follow up with her tomorrow to discuss the discharge plan further.  Patient's daughter is in agreement.    Will continue to follow.      PAULINA Ware, [facility]    [phone]  [facility]

## 2021-02-03 NOTE — PROGRESS NOTES
Comfort cares. Alert to self. 5L NC. Denies CP/pain. T/R PRN. Pt incont, liberty catheter in place. Plan to discharge on hospice tomorrow.

## 2021-02-03 NOTE — PLAN OF CARE
Full assessment and VS check deferred d/t pt. on comfort cares. Pt. Denies pain. Incontinent, condom cath in place. No BM this shift. Repositioned for comfort. PIV SL. Continue to monitor.

## 2021-02-03 NOTE — PROGRESS NOTES
SW:  D:  Call placed and message left for patient's daughter to discuss discharge plans.  Awaiting a return call.    P:  Will continue to follow.\      PAULINA Ware, [facility]    [phone]  [facility]

## 2021-02-03 NOTE — PROGRESS NOTES
Addended by: RADHA BOWDEN on: 1/20/2021 12:02 PM     Modules accepted: Orders     [facility]  Palliative Care Daily Progress Note       Recommendations & Counseling       Continue comfort measures only    Continue PO steroids, bronchodilators, PRN nebulizers for comfort    No further bipap or scheduled nebulizers at this point     Morphine high concentration 5-10mg SL every 2hrs PRN or 1-2mg IV Q1hr PRN pain, SOB     Ativan 0.5-1mg SL every 3hrs PRN or IV Q1hr PRN anxiety    Haldol 0.5-1mg SL every 6hrs PRN agitation     Atropine, robinul PRN secretions     SW consult for hospice, looking into placement     Case was reviewed with unit SILVIO Ray.     Linda Harding, APRN CNP  [facility]  Contact information available via [facility] Paging/Directory      Thank you for the opportunity to participate in the care of this patient and family. Our team: will continue to follow.     During regular M-F work hours ([id number]) -- if you are not sure who specifically to contact -- please contact us in [facility] Smart Web.     After regular work hours and on weekends/holidays, you can call our answering service at [phone].     Attestation:  Total time on the floor involved in the patient's care: 35 minutes  Total time spent in counseling/care coordination: >50%     Assessments          Jah Tate is a 76 year old male with PMH significant for severe end stage COPD on chronic 5L O2 at home, AAA, gout, and mild-moderate dementia who presents with acute on chronic respiratory failure 2/2 bilateral PNA. Pulmonary and ID following. Suspect infectious etiology, on broad spectrum abx. Cavitary lesions possibly could represent malignancy. S/p left thoracentesis 1/28 with 900cc removed. The last few days have brought a rather tenuous respiratory condition, requiring intermittent episodes on bipap, yet pt's compliance and tolerance with bipap has been poor. Pt transitioned to comfort measures on 2/2.     Today, the patient was seen for:  Symptom management, pt and family  support     Visited with pt this AM. He is seen resting in bed, watching TV/reading. Jah reports feeling well. His breathing is comfortable. He reports being incontinent of stool.     Jah and I again review his underlying COPD and the likelihood that we are at an advanced stage/end stage. He will likely die from his condition at some point. Most likely he will be bed/chair bound moving forward. We acknowledge the option to focus on comfort/alleviating symptoms, and stepping away from treatments that he does not like (bipap, nebs). We will support him with a program called hospice. We discussed anticipated time of discharge (1-2 days). Jah continues to be in complete agreement.     Prognosis, Goals, or Advance Care Planning was addressed today with: Yes.  Mood, coping, and/or meaning in the context of serious illness were addressed today: Yes.            Interval History:     Chart review/discussion with unit or clinical team members:   Transitioned to comfort measures yesterday. Has not needed morphine or ativan for comfort. Some mild disorientation.     Per patient or family/caregivers today:  Breathing is ok today. Able to read and watch TV. Incontinent of stool.     Key Palliative Symptoms:  # Pain severity the last 12 hours: none  # Dyspnea severity the last 12 hours: low  # Nausea severity the last 12 hours: none  # Anxiety severity the last 12 hours: low    Patient is on opioids: bowels not assessed today.           Review of Systems:     Besides above, an additional N/A system ROS was reviewed and is unremarkable          Medications:     I have reviewed this patient's medication profile and medications during this hospitalization.    Noted meds:    Mucinex 600mg PO BID   Prednisone 60mg PO daily    Morphine high concentration 5-10mg SL every 2hrs PRN or 1-2mg IV Q1hr PRN pain, SOB   Ativan 0.5-1mg SL every 3hrs PRN or IV Q1hr PRN anxiety  Haldol 0.5-1mg SL every 6hrs PRN agitation   Atropine,  robinul PRN secretions            Physical Exam:             Resp: 20     Oxygen Delivery: 5 LPM   CONSTITUTIONAL: Chronically ill man seen resting in bed in NAD. Alert and conversant. Calm and cooperative.  RESP: NL breathing on 5L via NC  NEUROLOGIC: Appropriately responsive during interview           Data Reviewed:     Recent imaging reviewed, my comments on pertinents:   Results for orders placed or performed during the hospital encounter of 01/25/21   XR Chest Port 1 View    Impression    IMPRESSION: There is masslike abnormal airspace opacity in the mid  left lung that is new compared to the prior study and associated with  a small left pleural effusion. There is also a trace right pleural  effusion. Findings are suggestive of pneumonia with parapneumonic  effusion. Continued radiographic follow-up until complete resolution  is recommended.    ORLANDO KRUSE MD   CT Chest Pulmonary Embolism w Contrast    Impression    IMPRESSION:  1.  Left lower lobe atelectasis was present on the prior study and is  not significantly changed. Large masslike airspace opacity in the  lateral left upper lobe is new and likely infectious given its rapid  development. The spiculated airspace opacity in the right middle lobe  is also completely new compared to the prior study and more likely to  represent infection. Hilar and mediastinal adenopathy is assumed to be  reactive. The possibility of aggressive malignancy cannot be entirely  excluded. Consider bronchoscopy.  2.  Infrarenal abdominal aortic aneurysm measures 5.8 cm compared to  3.7 cm on 8/2/2013. Vascular surgery referral recommended.    ORLANDO KRUSE MD   CTA Abdomen Pelvis with Contrast    Impression    IMPRESSION:  1. Aneurysmal dilatation of the abdominal aorta measuring up to 5.4 x  6.0 cm, previously 3.7 cm.  2. Moderate left pleural effusion with associated atelectasis.  3. Diverticulosis without evidence of diverticulitis.    LINDA FIGUEROA,    XR Chest  Port 1 View    Impression    IMPRESSION: Interval increase in density involving the left lung most compatible with increasing pneumonia with interstitial component. More focal dense component along the lateral aspect left perihilar region remain stable. Mild atelectasis medial   aspect right lung base with prominence of the right hilar region related to the focal soft tissue density in this area on the CT. Mild cardiac enlargement. Normal pulmonary vascularity.   US Thoracentesis    Impression    IMPRESSION: Technically successful thoracentesis without immediate  complications.    BALTAZAR REESE MD   US Lower Extremity Venous Duplex Bilateral    Impression    IMPRESSION: Negative for deep venous thrombosis in both lower  extremities.    DADA GARCIA MD   XR Chest Port 1 View    Impression    IMPRESSION: Improved left-sided infiltrates compared to previous.  Improved right base infiltrate.    BALTAZAR REESE MD       Recent lab data reviewed, my comments on pertinents:   Na 138  K 4.1  Creat 0.59  WBC 11.4  Hgb 10.5  Plt 251

## 2021-02-03 NOTE — PROGRESS NOTES
Care Management Follow Up    Length of Stay (days): 9    Expected Discharge Date: 02/04/21     Concerns to be Addressed: grief and loss, discharge planning     Patient plan of care discussed at interdisciplinary rounds: Yes    Anticipated Discharge Disposition: Hospice     Anticipated Discharge Services:  hospice  Anticipated Discharge DME:  Hospital bed,over the bed tray table    Patient/family educated on Medicare website which has current facility and service quality ratings: no  Education Provided on the Discharge Plan:  yes  Patient/Family in Agreement with the Plan: yes    Referrals Placed by CM/SW: Post Acute Facilities  Private pay costs discussed: private pay homecare    Additional Information:  Call placed to patient's daughter to discuss discharge plans.  Explained again the Medicare hospice beneftt with home hospice being intermittent homecare , 30-60 minutes 2-3 times a week.  Explained that he may need to hire private pay homecare 4 hours up to 24 hours.  Patient's daughter states that they just added services at the facility as well.   Patient's daughter states that she is planning on having patient discharge back to his apartment on discharge.  Explained that the facility prefers [facility] so I can make a hospice referral.  Patient's daughter is in agreement.  Explained that patient is medically stable to discharge so we can look at discharge depending on what [facility] can do for staffing.  Patient's daughter states she understands.   Patient's daughter is asking for a new POLST to be completed as the current one is full code.  Explained that we can do that.  Paged Kasey from palliative with this request.  She will complete a new POLSt tomorrow.  Call placed to [facility] to make a referral.  Spoke with Marcela.  She is asking for information to be faxed.  Ordered a hospital bed, an over the bed tray table, and oxygen.   She states that they can complete the intake tomorrow and  will follow up with me about timing.  She is asking for us to set up a 3 day supply of [facility] meds to go with the patient.    Will continue to follow.      PAULINA Ware, [facility]    [phone]  [facility]

## 2021-02-04 VITALS
HEART RATE: 99 BPM | TEMPERATURE: 97.8 F | WEIGHT: 164.2 LBS | OXYGEN SATURATION: 94 % | DIASTOLIC BLOOD PRESSURE: 71 MMHG | SYSTOLIC BLOOD PRESSURE: 115 MMHG | BODY MASS INDEX: 22.9 KG/M2 | RESPIRATION RATE: 18 BRPM

## 2021-02-04 PROCEDURE — 99232 SBSQ HOSP IP/OBS MODERATE 35: CPT | Performed by: HOSPITALIST

## 2021-02-04 PROCEDURE — 99232 SBSQ HOSP IP/OBS MODERATE 35: CPT | Performed by: NURSE PRACTITIONER

## 2021-02-04 PROCEDURE — 250N000012 HC RX MED GY IP 250 OP 636 PS 637: Performed by: INTERNAL MEDICINE

## 2021-02-04 PROCEDURE — 210N000002 HC R&B HEART CARE

## 2021-02-04 PROCEDURE — 250N000013 HC RX MED GY IP 250 OP 250 PS 637: Performed by: NURSE PRACTITIONER

## 2021-02-04 RX ADMIN — FLUTICASONE FUROATE AND VILANTEROL TRIFENATATE 1 PUFF: 200; 25 POWDER RESPIRATORY (INHALATION) at 08:30

## 2021-02-04 RX ADMIN — GUAIFENESIN 600 MG: 600 TABLET, EXTENDED RELEASE ORAL at 08:30

## 2021-02-04 RX ADMIN — UMECLIDINIUM 1 PUFF: 62.5 AEROSOL, POWDER ORAL at 08:30

## 2021-02-04 RX ADMIN — GUAIFENESIN 600 MG: 600 TABLET, EXTENDED RELEASE ORAL at 20:59

## 2021-02-04 RX ADMIN — PREDNISONE 60 MG: 20 TABLET ORAL at 08:30

## 2021-02-04 ASSESSMENT — ACTIVITIES OF DAILY LIVING (ADL)
ADLS_ACUITY_SCORE: 21

## 2021-02-04 NOTE — PROGRESS NOTES
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  CT of the chest indicated a cavitary lesion of the lungs.    Comfort cares only starting on 2/2/2021  Pneumonia of both lungs due to infectious organism, unspecified part of lung  Cavitary pneumonia  Masslike airspace opacity with concern for possible malignancy  End-stage COPD (FEV1 27%)  Left pleural effusion  Acute on chronic hypoxic respiratory failure  Abdominal aortic aneurysm  Chronic gout of multiple sites  Moderate Dementia   Bleeding from scrotum  Severe malnutrition in the context of chronic illness or disease    Patient is intermittently on 5 L of oxygen at home chronically, he continues to smoke half pack per day, currently presenting with 1 week history of shortness of breath, patient had received the COVID vaccine in the last week.    CT of the chest showed left lower lobe atelectatic cyst and a larger masslike airspace opacity in the lateral left upper lobe, which is new, there is also a spiculated airspace opacity in the right middle lobe.  There is associated hilar and mediastinal adenopathy.    Patient has history of chronic cough, currently he has a cavitary lesion in the left side of the lungs, the differential diagnosis includes cavitary pneumonia versus malignancy, tuberculosis was also a differential.Initially was placed on BiPAP in the ER and admitted to the ICU, subsequently weaned down to 5 lpm of supplemental oxygen by nasal cannula and transferred out of the ICU on 1/27/21.    QuantiFERON gold negative.Influenza and COVID-19 PCR negative on 1/25/21.    Infectious disease and pulmonology consulted, appreciate their assistance.    Treated with cefepime, flagyl, and vancomycin. All  antibiotics discontinued on 2/2/21 with transition to comfort cares.    Continue PTA Breo Ellipta and Incruse Ellipta. PRN albuterol available.  As needed Mucinex ordered. Continue steroids, switch to p.o. prednisone 60 mg, 1/30    Respiratory status deteriorated on 1/28/21. RRT called, see notes for details. Improved after he was placed on BiPAP. Transferred to [facility] status.  Patient is currently off of  BiPAP and is on nasal cannula O2, still continues to need 5 L of oxygen.  Plan to wean for SPO2 88% .    S/p thoracentesis on 1/28/21 with 900 ml of fluid removed. Culture negative to date. Cytology does not show any malignancy.   cultures growing Stenotrophomonas maltophilia, colonization versus infection.    Patient was transferred to medical floor on telemetry 1/30.  Patient further decompensated on 1/30 for which he was placed on BiPAP and transferred to [facility].  This was discussed in detail with the patient's daughter, recommended not to pursue intubation if it comes to that.    Palliative care consulted.    After multiple conversations, patient ultimately decided to transition to comfort cares on 2/2/21.    Continue comfort cares.    Continue inhalers, steroids, and oxygen for comfort.    Awaiting placement for end of life cares. Discussed with patient and social work this morning. Waiting to hear back from patient's facility. Possible discharge later today or tomorrow.     Diet: Regular Diet Adult  Snacks/Supplements Adult: Boost Shake; Between Meals    DVT Prophylaxis: None, comfort cares  Cope Catheter: not present  Code Status: No CPR- Do NOT Intubate           Disposition Plan   Expected discharge: possible discharge with hospice later today or tomorrow  Entered: Varun Edwards MD 02/04/2021, 9:48 AM       The patient's care was discussed with the Bedside Nurse, Care Coordinator/ and Patient.    Varun Edwards MD  Hospitalist Service  [facility]  Contact  information available via [facility] Paging/Directory    ______________________________________________________________________    Interval History   Jah Tate feels OK this morning. No new complaints/concerns. Denies fevers, chest pain, shortness of breath, nausea, abdominal pain. Discussed discharge plans with patient and .    Data reviewed today: I reviewed all medications, new labs and imaging results over the last 24 hours. I personally reviewed no images or EKG's today.    Physical Exam   Vital Signs: Temp: 97.9  F (36.6  C) Temp src: Oral BP: 134/63 Pulse: 81   Resp: 20 SpO2: 94 % O2 Device: Nasal cannula Oxygen Delivery: 5 LPM  Weight: 164 lbs 3.2 oz  Constitutional: awake, alert, cooperative, no apparent distress, laying in bed, frail  Respiratory: coarse, diminished at the bases  Cardiovascular: regular rate and rhythm, normal S1 and S2, no murmur noted  GI: normal bowel sounds, soft, non-distended, non-tender  Skin: warm, dry  Musculoskeletal: no lower extremity pitting edema present  Neurologic: awake, alert, answered my questions appropriately    Data   Recent Labs   Lab 02/01/21  0552 01/31/21  0613 01/30/21  0551 01/29/21  0601 01/28/21  1129 01/28/21  1129   WBC 11.4*  --  9.8 11.3*  --   --    HGB 10.5*  --  9.9* 9.9*  --   --    MCV 89  --  90 92  --   --     261 282 238   < >  --    NA  --   --  138 141  --  139   POTASSIUM  --   --  4.1 4.7  --  4.4   CHLORIDE  --   --  100 106  --  105   CO2  --   --  35* 32  --  28   BUN  --   --  29 24  --  19   CR  --  0.59* 0.66 0.57*  --  0.54*   ANIONGAP  --   --  3 3  --  6   SHEEBA  --   --  8.4* 8.3*  --  8.5   GLC  --   --  141* 143*  --  143*    < > = values in this interval not displayed.     Medications       fluticasone-vilanterol  1 puff Inhalation Daily     guaiFENesin  600 mg Oral BID     predniSONE  60 mg Oral Daily     sodium chloride 0.9 %  100 mL Intravenous Once     umeclidinium  1 puff Inhalation Daily

## 2021-02-04 NOTE — PROGRESS NOTES
[facility]  Palliative Care Daily Progress Note       Recommendations & Counseling       Continue comfort measures only    Continue PO steroids, bronchodilators, PRN nebulizers for comfort    No further bipap or scheduled nebulizers at this point     Morphine high concentration 5-10mg SL every 2hrs PRN or 1-2mg IV Q1hr PRN pain, SOB     Ativan 0.5-1mg SL every 3hrs PRN or IV Q1hr PRN anxiety    Haldol 0.5-1mg SL every 6hrs PRN agitation     Atropine, robinul PRN secretions     SW consult for hospice, looking into placement, ideally back at his apartment     POLST completed today recognizing wishes for DNR/DNI, comfort focused plan of care      Linda Hrading, APRN CNP  [facility]  Contact information available via [facility] Paging/Directory      Thank you for the opportunity to participate in the care of this patient and family. Our team: will continue to follow.     During regular M-F work hours (8055-5151) -- if you are not sure who specifically to contact -- please contact us in [facility] Smart Web.     After regular work hours and on weekends/holidays, you can call our answering service at [phone].     Attestation:  Total time on the floor involved in the patient's care: 15 minutes  Total time spent in counseling/care coordination: >50%     Assessments          Jah Tate is a 76 year old male with PMH significant for severe end stage COPD on chronic 5L O2 at home, AAA, gout, and mild-moderate dementia who presents with acute on chronic respiratory failure 2/2 bilateral PNA. Pulmonary and ID following. Suspect infectious etiology, on broad spectrum abx. Cavitary lesions possibly could represent malignancy. S/p left thoracentesis 1/28 with 900cc removed. The last few days have brought a rather tenuous respiratory condition, requiring intermittent episodes on bipap, yet pt's compliance and tolerance with bipap has been poor. Pt transitioned to comfort measures on  2/2.     Today, the patient was seen for:  Symptom management, pt and family support     Visited with pt this AM. He is seen resting in bed, watching TV. His breathing is comfortably, he generally feels well. He is bedbound at this point. He is looking forward to returning home to his wife.    POLST completed to reflect his wishes for DNR/DNI status, comfort focused plan of care.     Prognosis, Goals, or Advance Care Planning was addressed today with: Yes.  Mood, coping, and/or meaning in the context of serious illness were addressed today: Yes.            Interval History:     Chart review/discussion with unit or clinical team members:   Comfort measures continue.     Per patient or family/caregivers today:  Breathing is ok today. Able to read and watch TV. Incontinent of stool. Wife visited last evening. Looking forward to returning home.     Key Palliative Symptoms:  # Pain severity the last 12 hours: none  # Dyspnea severity the last 12 hours: low  # Nausea severity the last 12 hours: none  # Anxiety severity the last 12 hours: low    Patient is on opioids: bowels not assessed today.           Review of Systems:     Besides above, an additional N/A system ROS was reviewed and is unremarkable          Medications:     I have reviewed this patient's medication profile and medications during this hospitalization.    Noted meds:    Mucinex 600mg PO BID   Prednisone 60mg PO daily    Morphine high concentration 5-10mg SL every 2hrs PRN or 1-2mg IV Q1hr PRN pain, SOB   Ativan 0.5-1mg SL every 3hrs PRN or IV Q1hr PRN anxiety  Haldol 0.5-1mg SL every 6hrs PRN agitation   Atropine, robinul PRN secretions            Physical Exam:   Temp: 97.9  F (36.6  C) Temp src: Oral BP: 134/63 Pulse: 81   Resp: 20 SpO2: 94 % O2 Device: Nasal cannula Oxygen Delivery: 5 LPM   CONSTITUTIONAL: Chronically ill man seen resting in bed in NAD. Alert and conversant. Calm and cooperative.  RESP: NL breathing on 5L via NC  NEUROLOGIC:  Appropriately responsive during interview           Data Reviewed:     Recent imaging reviewed, my comments on pertinents:   Results for orders placed or performed during the hospital encounter of 01/25/21   XR Chest Port 1 View    Impression    IMPRESSION: There is masslike abnormal airspace opacity in the mid  left lung that is new compared to the prior study and associated with  a small left pleural effusion. There is also a trace right pleural  effusion. Findings are suggestive of pneumonia with parapneumonic  effusion. Continued radiographic follow-up until complete resolution  is recommended.    ORLANDO KRUSE MD   CT Chest Pulmonary Embolism w Contrast    Impression    IMPRESSION:  1.  Left lower lobe atelectasis was present on the prior study and is  not significantly changed. Large masslike airspace opacity in the  lateral left upper lobe is new and likely infectious given its rapid  development. The spiculated airspace opacity in the right middle lobe  is also completely new compared to the prior study and more likely to  represent infection. Hilar and mediastinal adenopathy is assumed to be  reactive. The possibility of aggressive malignancy cannot be entirely  excluded. Consider bronchoscopy.  2.  Infrarenal abdominal aortic aneurysm measures 5.8 cm compared to  3.7 cm on 8/2/2013. Vascular surgery referral recommended.    ORLANDO KRUSE MD   CTA Abdomen Pelvis with Contrast    Impression    IMPRESSION:  1. Aneurysmal dilatation of the abdominal aorta measuring up to 5.4 x  6.0 cm, previously 3.7 cm.  2. Moderate left pleural effusion with associated atelectasis.  3. Diverticulosis without evidence of diverticulitis.    LINDA FIGUEROA DO   XR Chest Port 1 View    Impression    IMPRESSION: Interval increase in density involving the left lung most compatible with increasing pneumonia with interstitial component. More focal dense component along the lateral aspect left perihilar region remain  stable. Mild atelectasis medial   aspect right lung base with prominence of the right hilar region related to the focal soft tissue density in this area on the CT. Mild cardiac enlargement. Normal pulmonary vascularity.   US Thoracentesis    Impression    IMPRESSION: Technically successful thoracentesis without immediate  complications.    BALTAZAR REESE MD   US Lower Extremity Venous Duplex Bilateral    Impression    IMPRESSION: Negative for deep venous thrombosis in both lower  extremities.    DADA GARCIA MD   XR Chest Port 1 View    Impression    IMPRESSION: Improved left-sided infiltrates compared to previous.  Improved right base infiltrate.    BALTAZAR REESE MD       Recent lab data reviewed, my comments on pertinents:   Na 138  K 4.1  Creat 0.59  WBC 11.4  Hgb 10.5  Plt 251

## 2021-02-04 NOTE — PROGRESS NOTES
Clinical Nutrition Services Brief Note  Chart reviewed for scheduled reassessment  Notes transition to comfort cares on 2/2  Will sign off at this time, please consult if needs arise    Anabela Frank  Dietetic Intern

## 2021-02-04 NOTE — PROGRESS NOTES
Comfort cares. Alert to self. 5L NC. Denies CP/pain. T/R PRN. Pt incont, liberty catheter in place. Very poor appetite, only eats desserts/boost shakes. Plan to discharge tomorrow at 1100 back to [facility].

## 2021-02-05 PROCEDURE — 99239 HOSP IP/OBS DSCHRG MGMT >30: CPT | Performed by: HOSPITALIST

## 2021-02-05 RX ORDER — ACETAMINOPHEN 650 MG/1
650 SUPPOSITORY RECTAL EVERY 4 HOURS PRN
Qty: 2 SUPPOSITORY | Refills: 0 | Status: SHIPPED | OUTPATIENT
Start: 2021-02-05

## 2021-02-05 RX ORDER — ATROPINE SULFATE 10 MG/ML
1-2 SOLUTION/ DROPS OPHTHALMIC EVERY 4 HOURS PRN
Qty: 5 ML | Refills: 0 | Status: SHIPPED | OUTPATIENT
Start: 2021-02-05

## 2021-02-05 RX ORDER — AMOXICILLIN 250 MG
1-2 CAPSULE ORAL 2 TIMES DAILY PRN
Qty: 20 TABLET | Refills: 0 | Status: SHIPPED | OUTPATIENT
Start: 2021-02-05

## 2021-02-05 RX ORDER — PREDNISONE 10 MG/1
TABLET ORAL
Qty: 47 TABLET | Refills: 0 | Status: SHIPPED | OUTPATIENT
Start: 2021-02-05 | End: 2021-03-07

## 2021-02-05 RX ORDER — HALOPERIDOL 2 MG/ML
.5-1 SOLUTION ORAL EVERY 6 HOURS PRN
Qty: 15 ML | Refills: 0 | Status: SHIPPED | OUTPATIENT
Start: 2021-02-05

## 2021-02-05 RX ORDER — LORAZEPAM 2 MG/ML
.25-.5 CONCENTRATE ORAL EVERY 4 HOURS PRN
Qty: 30 ML | Refills: 0 | Status: SHIPPED | OUTPATIENT
Start: 2021-02-05 | End: 2021-05-02

## 2021-02-05 RX ORDER — BISACODYL 10 MG
10 SUPPOSITORY, RECTAL RECTAL DAILY PRN
Qty: 2 SUPPOSITORY | Refills: 0 | Status: SHIPPED | OUTPATIENT
Start: 2021-02-05

## 2021-02-05 RX ORDER — MORPHINE SULFATE 100 MG/5ML
2.5-5 SOLUTION ORAL
Qty: 15 ML | Refills: 0 | Status: SHIPPED | OUTPATIENT
Start: 2021-02-05

## 2021-02-05 ASSESSMENT — ACTIVITIES OF DAILY LIVING (ADL)
ADLS_ACUITY_SCORE: 21

## 2021-02-05 NOTE — PROGRESS NOTES
"SPIRITUAL HEALTH SERVICES Progress Note  [facility] (palliative pt)    Visited pt to check in prior to discharge. I checked in with him about how is doing with getting ready to discharge and pt stated \"I'm leaving, I think, but not sure where to\" and that he thinks the plan is to return to his assisted living home where he lives with spouse. I offered words of our care and continued best to pt as he discharges from hospital. Jah expressed appreciation for my visit.      Padmini Edgar  Chaplain Resident    "

## 2021-02-05 NOTE — PLAN OF CARE
Pt picked up by [facility] ambulance. He is going back to his facility on hospice. Discharge instructions were gone over with him. Meds and paperwork was sent with EMS.

## 2021-02-05 NOTE — PROGRESS NOTES
Care Management Discharge Note    Discharge Date: 02/05/21       Discharge Disposition: Hospice    Discharge Services:  hospice    Discharge DME:  Hospital bed, over the bed tray table, oxygen    Discharge Transportation: agency    Private pay costs discussed: private pay homecare  PAS Confirmation Code:  N/A  Patient/family educated on Medicare website which has current facility and service quality ratings: no    Education Provided on the Discharge Plan:  yes  Persons Notified of Discharge Plans: daughter Padmini, nursing at [facility], and [facility]  Patient/Family in Agreement with the Plan: yes    Handoff Referral Completed: No    Additional Information:  Received discharge orders for patient.  Patient is planning on discharging back to [facility] with [facility] today.  [facility] stretcher transport set up for 11:00 today.  Patient and daughter informed of the plan and in agreement to the plan.  Call placed to update [facility] and faxed the orders.  Call placed to update [facility].  Also explained that we received oxygen tanks delivered to the hospital that we do not need here as patient is traveling via stretcher so requested that they come and pick them up.  Faxed the discharge orders to [facility].        PAULINA Ware, [facility]    [phone]  [facility]

## 2021-02-05 NOTE — PROGRESS NOTES
Care Management Follow Up    Length of Stay (days): 10    Expected Discharge Date: 02/05/21     Concerns to be Addressed: grief and loss, discharge planning     Patient plan of care discussed at interdisciplinary rounds: Yes    Anticipated Discharge Disposition: Hospice     Anticipated Discharge Services:  hospice  Anticipated Discharge DME:  Hospital bed, oxygen, over the bed tray table    Patient/family educated on Medicare website which has current facility and service quality ratings: no  Education Provided on the Discharge Plan:  yes  Patient/Family in Agreement with the Plan: yes    Referrals Placed by CM/SW: Post Acute Facilities  Private pay costs discussed: private pay homecare  Additional Information:  Call placed to [facility] to follow up on the referral.  They plan on seeing patient back at the facility once patient returns there.  Call placed to [facility] to arrange for stretcher transport at 11:00 tomorrow as patient in on oxygen that he can not manage himself, he is hospice, and needs closer monitoring in the back of the rig.  Faxed the facesheet and PCS to [facility].  Call placed to update patient's daughter and she is in agreement.  Call placed to update [facility].  Call placed to update [facility].  They are asking for the discharge orders to be faxed, [phone].  Patient will need to discharge with a 3 day supply of the [facility] meds.    Will continue to follow.      PAULINA Ware, [facility]    [phone]  [facility]

## 2021-02-05 NOTE — DISCHARGE INSTRUCTIONS
Patient will discharge back to [facility], via [facility] stretcher transport at 11:00 with [facility].  [facility] will contact patient's daughter to arrange for the intake time to take place at the facility.  [facility]'s phone number is [phone].

## 2021-02-05 NOTE — DISCHARGE SUMMARY
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  CT of the chest indicated a cavitary lesion of the lungs.    Hospital Course   Jah Tate was admitted on 1/25/2021.  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    He presented with 1 week history of shortness of breath, patient had received the COVID vaccine in the last week.    CT of the chest showed left lower lobe atelectatic cyst and  a larger masslike airspace opacity in the lateral left upper lobe, which is new, there was also a spiculated airspace opacity in the right middle lobe.  There was associated hilar and mediastinal adenopathy.    Patient has history of chronic cough, currently he has a cavitary lesion in the left side of the lungs, the differential diagnosis includes cavitary pneumonia versus malignancy, tuberculosis was also a differential.Initially was placed on BiPAP in the ER and admitted to the ICU, subsequently weaned down to 5 lpm of supplemental oxygen by nasal cannula and transferred out of the ICU on 1/27/21.    QuantiFERON gold negative.    Influenza and COVID-19 PCR negative on 1/25/21.    Infectious disease and pulmonology consulted, appreciated their assistance.    He was treated with cefepime, flagyl, and vancomycin.    Continued PTA Breo Ellipta and Incruse Ellipta. PRN albuterol available.  As needed Mucinex ordered. PTA steroids were increased for treatment of COPD exacerbation.    S/p thoracentesis on 1/28/21 with 900 ml of fluid removed. Culture negative to date. Cytology does not show any malignancy. Sputum culture grew Stenotrophomonas maltophilia, unclear if colonization versus infection.    Respiratory status fluctuated significantly throughout his hospitalization. He improved shortly after admission, but subsequently had multiple deteriorations of his respiratory status. He had multiple RRTs and required BiPAP on multiple occasions.    Palliative care was consulted.    After several discussion regarding goals of care, the decision was made to transition to comfort cares on 2/2/21.    Arrangements have been made for him to discharge back to his apartment with hospice.    Discharge today, follow-up with hospice later today and PCP as needed.    Will continue his inhalers for comfort and taper his steroids to try to avoid provoking significant respiratory symptoms. Continue PTA supplemental oxygen at 5 lpm, can  be titrated for comfort.    Updated his daughter on the day of discharge. Questions invited and answered.    Varun Edwards MD    Significant Results and Procedures   As above.    Pending Results   None    Code Status   Comfort Care       Primary Care Physician   Eric Johnson    Physical Exam   Temp: 97.8  F (36.6  C) Temp src: Oral BP: 115/71 Pulse: 99   Resp: 18 SpO2: 94 % O2 Device: Nasal cannula Oxygen Delivery: 5 LPM  Vitals:    02/01/21 0307 02/02/21 0627 02/03/21 0540   Weight: 73.6 kg (162 lb 4.8 oz) 72.6 kg (160 lb 1.6 oz) 74.5 kg (164 lb 3.2 oz)     Vital Signs with Ranges  Temp:  [97.8  F (36.6  C)] 97.8  F (36.6  C)  Pulse:  [99] 99  Resp:  [18] 18  BP: (115)/(71) 115/71  SpO2:  [94 %] 94 %  I/O last 3 completed shifts:  In: 350 [P.O.:350]  Out: 2000 [Urine:2000]    Constitutional: awake, alert, cooperative, no apparent distress, laying in bed  Respiratory: diminished at the bases  Cardiovascular: regular rate and rhythm, normal S1 and S2, no murmur noted  GI: normal bowel sounds, soft, non-distended, non-tender  Skin: warm, dry  Musculoskeletal: no lower extremity pitting edema present  Neurologic: awake, alert, answers my questions appropriately    Discharge Disposition   Discharged to home with hospice  Condition at discharge: Stable    Consultations This Hospital Stay   PHARMACY TO DOSE VANCO  PULMONARY IP CONSULT  CARE MANAGEMENT / SOCIAL WORK IP CONSULT  PHYSICAL THERAPY ADULT IP CONSULT  OCCUPATIONAL THERAPY ADULT IP CONSULT  PHARMACY TO DOSE VANCO  INFECTIOUS DISEASES IP CONSULT  VASCULAR SURGERY IP CONSULT  PULMONARY IP CONSULT  PALLIATIVE CARE ADULT IP CONSULT  PHYSICAL THERAPY ADULT IP CONSULT  OCCUPATIONAL THERAPY ADULT IP CONSULT  SOCIAL WORK IP CONSULT  SOCIAL WORK IP CONSULT  CARE MANAGEMENT / SOCIAL WORK IP CONSULT    Time Spent on this Encounter   I, Varun Edwards MD, personally saw the patient today and spent greater than 30 minutes discharging this patient.    Discharge  Orders      Reason for your hospital stay    You were in the hospital due to a COPD exacerbation and pneumonia. During your hospitalization, the decision was made to transition to comfort cares and you are being discharged home with hospice.     Follow-up and recommended labs and tests     Follow up with primary care provider, Eric Johnson, as needed.  Follow up with hospice after discharge.     Activity    Your activity upon discharge: activity as tolerated with assistance     Discharge Instructions    Resume home oxygen at 5 lpm as previously ordered. May titrate for comfort.     No CPR- Do NOT Intubate     Diet    Follow this diet upon discharge: Regular Diet Adult     Discharge Medications   Current Discharge Medication List      START taking these medications    Details   acetaminophen (TYLENOL) 650 MG suppository Place 1 suppository (650 mg) rectally every 4 hours as needed for fever or mild pain  Qty: 2 suppository, Refills: 0    Associated Diagnoses: End of life care      atropine 1 % ophthalmic solution Take 1-2 drops by mouth, place under tongue or place inside cheek every 4 hours as needed for secretions  Qty: 5 mL, Refills: 0    Associated Diagnoses: End of life care      bisacodyl (DULCOLAX) 10 MG suppository Place 1 suppository (10 mg) rectally daily as needed for constipation  Qty: 2 suppository, Refills: 0    Associated Diagnoses: End of life care      haloperidol (HALDOL) 2 MG/ML (HIGH CONC) solution Take 0.25-0.5 mLs (0.5-1 mg) by mouth every 6 hours as needed for agitation (nausea)  Qty: 15 mL, Refills: 0    Associated Diagnoses: End of life care      LORazepam (ATIVAN) 2 MG/ML (HIGH CONC) solution Take 0.125-0.25 mLs (0.25-0.5 mg) by mouth every 4 hours as needed for anxiety (restlessness)  Qty: 30 mL, Refills: 0    Associated Diagnoses: End of life care      morphine sulfate, high concentrate, (ROXANOL-CONCENTRATED) 20 MG/ML concentrated solution Take 0.125-0.25 mLs (2.5-5 mg) by mouth  every 2 hours as needed for shortness of breath / dyspnea or breakthrough pain  Qty: 15 mL, Refills: 0    Associated Diagnoses: End of life care      senna-docusate (SENOKOT-S/PERICOLACE) 8.6-50 MG tablet Take 1-2 tablets by mouth 2 times daily as needed for constipation  Qty: 20 tablet, Refills: 0    Associated Diagnoses: End of life care         CONTINUE these medications which have CHANGED    Details   predniSONE (DELTASONE) 10 MG tablet Take 4 tablets (40 mg) by mouth daily for 4 days, THEN 3 tablets (30 mg) daily for 4 days, THEN 2 tablets (20 mg) daily for 4 days, THEN 1 tablet (10 mg) daily for 4 days, THEN 0.5 tablets (5 mg) daily for 14 days.  Qty: 47 tablet, Refills: 0    Associated Diagnoses: End of life care         CONTINUE these medications which have NOT CHANGED    Details   fluticasone-vilanterol (BREO ELLIPTA) 200-25 MCG/INH inhaler Inhale 1 puff into the lungs daily  Qty: 28 each, Refills: 3    Associated Diagnoses: COPD exacerbation (H)      INCRUSE ELLIPTA 62.5 MCG/INH Inhaler TAKE 1 PUFF BY MOUTH EVERY DAY  Qty: 30 each, Refills: 3    Comments: DX Code Needed  NEED REFILLS PLEASE.  Associated Diagnoses: COPD exacerbation (H)      ipratropium - albuterol 0.5 mg/2.5 mg/3 mL (DUONEB) 0.5-2.5 (3) MG/3ML neb solution Take 1 vial by nebulization every 6 hours as needed       albuterol (PROAIR HFA/PROVENTIL HFA/VENTOLIN HFA) 108 (90 Base) MCG/ACT inhaler INHALE 2 PUFFS BY MOUTH EVERY 6 HOURS AS NEEDED FOR SHORTNESS OF BREATH OR WHEEZING.  Qty: 18 Inhaler, Refills: 0    Associated Diagnoses: COPD exacerbation (H)         STOP taking these medications       aspirin 81 MG tablet Comments:   Reason for Stopping:         docusate sodium (DULCOLAX STOOL SOFTENER) 100 MG capsule Comments:   Reason for Stopping:         folic acid (FOLVITE) 1 MG tablet Comments:   Reason for Stopping:         vitamin D3 (CHOLECALCIFEROL) 2000 units (50 mcg) tablet Comments:   Reason for Stopping:             Allergies    Allergies   Allergen Reactions     Penicillins Swelling     Has tolerated Keflex, cefuroxime     Sulfa Drugs Swelling     Chantix [Varenicline Tartrate]      Hallucinations       Data   Most Recent 3 CBC's:  Recent Labs   Lab Test 02/01/21  0552 01/31/21  0613 01/30/21  0551 01/29/21  0601   WBC 11.4*  --  9.8 11.3*   HGB 10.5*  --  9.9* 9.9*   MCV 89  --  90 92    261 282 238      Most Recent 3 BMP's:  Recent Labs   Lab Test 01/31/21  0613 01/30/21  0551 01/29/21  0601 01/28/21  1129   NA  --  138 141 139   POTASSIUM  --  4.1 4.7 4.4   CHLORIDE  --  100 106 105   CO2  --  35* 32 28   BUN  --  29 24 19   CR 0.59* 0.66 0.57* 0.54*   ANIONGAP  --  3 3 6   SHEEBA  --  8.4* 8.3* 8.5   GLC  --  141* 143* 143*     Most Recent 2 LFT's:  Recent Labs   Lab Test 01/25/21  0948 06/24/20  0543   AST 17 17   ALT 16 16   ALKPHOS 83 79   BILITOTAL 0.8 0.5     Most Recent INR's and Anticoagulation Dosing History:  Anticoagulation Dose History     Recent Dosing and Labs Latest Ref Rng & Units 1/25/2021    INR 0.86 - 1.14 1.24(H)        Most Recent 3 Troponin's:  Recent Labs   Lab Test 01/25/21  0948 06/24/20  0543 04/06/20  1608   TROPI 0.024 0.027 0.016     Most Recent 6 Bacteria Isolates From Any Culture (See EPIC Reports for Culture Details):  Recent Labs   Lab Test 01/28/21  1430 01/26/21  2130 01/26/21  1210 01/26/21  0442 01/25/21  1016 06/25/20 2000   CULT No growth Light growth  Normal oma    Light growth  Stenotrophomonas maltophilia  *  Light growth  Strain 2  Stenotrophomonas maltophilia  * Canceled, Test credited  >10 Squamous epithelial cells/low power field indicates oral contamination. Please   recollect.  *  Notification of test cancellation was given to  JUAN ALBERTO COON RN 01/26/21 2015 .   No growth No growth Heavy growth  Normal oma       Results for orders placed or performed during the hospital encounter of 01/25/21   XR Chest Port 1 View    Narrative    CHEST ONE VIEW PORTABLE   1/25/2021 10:18 AM      HISTORY: Respiratory distress.    COMPARISON: 6/28/2020      Impression    IMPRESSION: There is masslike abnormal airspace opacity in the mid  left lung that is new compared to the prior study and associated with  a small left pleural effusion. There is also a trace right pleural  effusion. Findings are suggestive of pneumonia with parapneumonic  effusion. Continued radiographic follow-up until complete resolution  is recommended.    ORLANDO KRUSE MD   CT Chest Pulmonary Embolism w Contrast    Narrative    CT CHEST PULMONARY EMBOLISM WITH CONTRAST 1/25/2021 11:36 AM    CLINICAL HISTORY: Pulmonary embolus suspected, low/intermediate  probability, positive D-dimer; mass-like lesion and fluid on chest  x-ray.    TECHNIQUE: CT angiogram chest during arterial phase injection IV  contrast. 2D and 3D MIP reconstructions were performed by the CT  technologist. Dose reduction techniques were used.     CONTRAST: 66mL Isovue-370    COMPARISON: 6/26/2020, 4/6/2020, 8/2/2013    FINDINGS:  ANGIOGRAM CHEST: Pulmonary arteries are normal caliber and negative  for pulmonary emboli. Thoracic aorta is normal in caliber and negative  for dissection.    LUNGS AND PLEURA: Lungs are emphysematous. There is complete  atelectasis of the left lower lobe some small air bronchograms. There  is masslike airspace opacity in the pleural-based left upper lobe  without air bronchograms that measures 5.9 x 7.6 cm. There is a  spiculated airspace opacity in the central right middle lobe that  measures 2.3 x 1.2 cm (series 8, image 227). There is also mucous  plugging and volume loss in the right lower lobe. A small to  moderate-sized left pleural effusion is present.    MEDIASTINUM/AXILLAE: There is mediastinal and hilar adenopathy. A  dominant right hilar lymph node measures 2.5 x 1.8 cm (series 6, image  123). An enlarged AP window lymph node measures 1.8 x 1.9 cm (series  6, image 61). No axillary adenopathy. Thyroid gland is  unremarkable.    UPPER ABDOMEN: Incompletely imaged infrarenal abdominal aortic  aneurysm measures up to 5.8 cm (series 6, image 216).    MUSCULOSKELETAL: No destructive bone lesions.      Impression    IMPRESSION:  1.  Left lower lobe atelectasis was present on the prior study and is  not significantly changed. Large masslike airspace opacity in the  lateral left upper lobe is new and likely infectious given its rapid  development. The spiculated airspace opacity in the right middle lobe  is also completely new compared to the prior study and more likely to  represent infection. Hilar and mediastinal adenopathy is assumed to be  reactive. The possibility of aggressive malignancy cannot be entirely  excluded. Consider bronchoscopy.  2.  Infrarenal abdominal aortic aneurysm measures 5.8 cm compared to  3.7 cm on 8/2/2013. Vascular surgery referral recommended.    ORLANDO KRUSE MD   CTA Abdomen Pelvis with Contrast    Narrative    CTA ABDOMEN AND PELVIS WITH CONTRAST   1/26/2021 1:09 PM     HISTORY: Aneurysm, pelvis or lower extremity    TECHNIQUE: CTA abdomen and pelvis with 80mL ISOVUE-370 IV. Radiation  dose for this scan was reduced using automated exposure control,  adjustment of the mA and/or kV according to patient size, or iterative  reconstruction technique. 3-D images were created at an independent  workstation under concurrent supervision at the request of the  ordering provider.    COMPARISON: 8/2/2013    FINDINGS:   Lung bases: Moderate left pleural effusion with associated  atelectasis. Coronary artery calcifications are again noted.    Vasculature: Again noted, there is aneurysmal dilatation of the  infrarenal abdominal aorta measuring up to 5.4 x 6.0 cm, previously  3.7 cm. The celiac access, superior mesenteric artery, bilateral renal  arteries and inferior mesenteric artery are all patent. Bilateral  common iliac, internal iliac and external iliac arteries are patent.    Abdomen: Evaluation of  solid organ parenchyma is limited secondary to  field-of-view and contrast bolus timing. The visualized portions of  the spleen, kidneys, adrenal glands, liver, gallbladder and pancreas  show no focal abnormality. No intrahepatic or extrahepatic biliary  dilatation. No intraperitoneal free air or free fluid.    Small and large bowel are normal in caliber without evidence of  obstruction. Diverticulosis without evidence of diverticulitis. No  abdominal or pelvic lymphadenopathy. Bladder is normal.    Bones: No suspicious bony lesions.      Impression    IMPRESSION:  1. Aneurysmal dilatation of the abdominal aorta measuring up to 5.4 x  6.0 cm, previously 3.7 cm.  2. Moderate left pleural effusion with associated atelectasis.  3. Diverticulosis without evidence of diverticulitis.    LINDA FIGUEROA,    XR Chest Port 1 View    Narrative    EXAM: XR CHEST PORT 1 VW  LOCATION: [facility]  DATE/TIME: 1/28/2021 5:15 AM    INDICATION: Shortness of breath with history of bilateral pneumonia.  COMPARISON: CT 1/26/2021 and 1/25/2021. Chest radiograph 01/25/2021.      Impression    IMPRESSION: Interval increase in density involving the left lung most compatible with increasing pneumonia with interstitial component. More focal dense component along the lateral aspect left perihilar region remain stable. Mild atelectasis medial   aspect right lung base with prominence of the right hilar region related to the focal soft tissue density in this area on the CT. Mild cardiac enlargement. Normal pulmonary vascularity.   US Thoracentesis    Narrative    ULTRASOUND GUIDED THORACENTESIS  1/28/2021 2:59 PM     HISTORY: Moderate left pleural effusion    FINDINGS: Ultrasound was used to evaluate for the presence and best  approach for drainage of a pleural effusion. Written and oral informed  consent was obtained. A pause for the cause procedure to verify the  correct patient and correct procedure. The skin overlying the  left  chest posteriorly was prepped and draped in the usual sterile fashion.  The subcutaneous tissues were anesthetized with 10 mL 1% lidocaine . A  catheter was advanced into the pleural space and 900 mL of  asha  colored fluid was drained. Patient was monitored by nurse under my  direct supervision throughout the exam. Ultrasound images were  permanently stored.  There were no immediate complications. Patient  left the ultrasound suite in satisfactory condition.      Impression    IMPRESSION: Technically successful thoracentesis without immediate  complications.    BALTAZAR REESE MD   US Lower Extremity Venous Duplex Bilateral    Narrative    VENOUS ULTRASOUND BILATERAL LOWER EXTREMITIES  2021 3:21 PM     HISTORY: Rule out deep vein thrombosis (DVT), AECOPD, possible  asymmetric lower extremity edema    COMPARISON: None.    TECHNIQUE: Color Doppler and spectral waveform analysis performed  throughout the deep veins of both lower extremities.    FINDINGS: Both common femoral, proximal greater saphenous, femoral,  and popliteal veins demonstrate normal blood flow, compression, and  augmentation. Posterior tibial and peroneal veins are compressible  bilaterally.      Impression    IMPRESSION: Negative for deep venous thrombosis in both lower  extremities.    DADA GARCIA MD   XR Chest Port 1 View    Narrative    CHEST ONE VIEW  2021 2:39 PM     HISTORY: Worsening hypoxia, large amount of secretions.    COMPARISON: 2021      Impression    IMPRESSION: Improved left-sided infiltrates compared to previous.  Improved right base infiltrate.    BALTAZAR REESE MD   Echocardiogram Complete    Narrative    [id number]  [id number]  [id number]  [id number]^MOHIT^CASSI^LIZABETH           [facility]  Echocardiography Laboratory  [address] [place] [address]        Name: ARTURO FORD  MRN: [id number]  : 1945  Study Date: 2021 10:31 AM  Age: 76 yrs  Gender: Male  Patient  Location: [facility]  Reason For Study: Aortic Valve Disorder  Ordering Physician: CASSI RUEDA  Referring Physician: Eric Johnson  Performed By: Mckinley Gill     BSA: 1.9 m2  Height: 71 in  Weight: 166 lb  HR: 72  BP: 133/75 mmHg  _____________________________________________________________________________  __        Procedure  Complete Portable Echo Adult. Optison (NDC #6541-9993) given intravenously.  _____________________________________________________________________________  __        Interpretation Summary     Moderate valvular aortic stenosis.  There has been a significant increase in the gradients across the aortic valve  since 6/30/2020.  Right ventricular systolic pressure is elevated, consistent with moderate  pulmonary hypertension.  There is mild (1+) aortic regurgitation.  Mild aortic root dilatation.  The ascending aorta is Mildly dilated.  Trivial pericardial effusion  The study was technically difficult.  _____________________________________________________________________________  __        Left Ventricle  The left ventricle is normal in size. There is normal left ventricular wall  thickness. Diastolic Doppler findings (E/E' ratio and/or other parameters)  suggest left ventricular filling pressures are increased. The visual ejection  fraction is estimated at 65-70%. Left ventricular systolic function is normal.     Right Ventricle  The right ventricle is normal in size and function.     Atria  The left atrium is moderately dilated. The right atrium is moderately dilated.  Lipomatous hypertrophy of the interatrial septum is noted. There is no color  Doppler evidence of an atrial shunt.     Mitral Valve  There is moderate mitral annular calcification. There is trace mitral  regurgitation.        Tricuspid Valve  There is mild (1+) tricuspid regurgitation. The right ventricular systolic  pressure is approximated at 41.7 mmHg plus the right atrial pressure. Right  ventricular systolic pressure is  elevated, consistent with moderate pulmonary  hypertension.     Aortic Valve  The aortic valve is not well visualized. There is mild (1+) aortic  regurgitation. The peak AoV pressure gradient is 47.0 mmHg. The mean AoV  pressure gradient is 26.7 mmHg. The calculated aortic valve are is 1.1 cm^2.  Moderate valvular aortic stenosis.     Pulmonic Valve  The pulmonic valve is not well visualized.     Vessels  Mild aortic root dilatation. The ascending aorta is Mildly dilated. IVC  diameter and respiratory changes fall into an intermediate range suggesting an  RA pressure of 8 mmHg.     Pericardium  Trivial pericardial effusion.        Rhythm  Sinus rhythm was noted.  _____________________________________________________________________________  __  MMode/2D Measurements & Calculations  IVSd: 0.86 cm     LVIDd: 5.2 cm  LVIDs: 3.1 cm  LVPWd: 1.1 cm  FS: 40.0 %  LV mass(C)d: 187.3 grams  LV mass(C)dI: 96.1 grams/m2  Ao root diam: 4.1 cm  LA dimension: 3.8 cm  asc Aorta Diam: 3.7 cm  LA/Ao: 0.92  LVOT diam: 2.1 cm  LVOT area: 3.6 cm2  LA Volume (BP): 80.4 ml  LA Volume Index (BP): 41.2 ml/m2  RWT: 0.41           Doppler Measurements & Calculations  MV E max constantino: 122.0 cm/sec  MV A max constantino: 141.2 cm/sec  MV E/A: 0.86  MV max P.2 mmHg  MV mean PG: 3.1 mmHg  MV V2 VTI: 44.1 cm  MVA(VTI): 2.1 cm2  MV dec slope: 482.2 cm/sec2  Ao V2 max: 344.2 cm/sec  Ao max P.0 mmHg  Ao V2 mean: 236.9 cm/sec  Ao mean P.7 mmHg  Ao V2 VTI: 80.1 cm  HOOD(I,D): 1.1 cm2  HOOD(V,D): 1.2 cm2  LV V1 max P.2 mmHg  LV V1 max: 114.5 cm/sec  LV V1 VTI: 25.4 cm  SV(LVOT): 91.7 ml  SI(LVOT): 47.0 ml/m2  PA acc time: 0.12 sec  TR max constantino: 323.1 cm/sec  TR max P.7 mmHg  AV Constantino Ratio (DI): 0.33  HOOD Index (cm2/m2): 0.59  E/E' av.8  Lateral E/e': 17.5  Medial E/e': 22.0              _____________________________________________________________________________  __        Report approved by: Arlet Sanford 2021 01:41 PM

## 2021-02-09 ENCOUNTER — DOCUMENTATION ONLY (OUTPATIENT)
Dept: OTHER | Facility: CLINIC | Age: 76
End: 2021-02-09

## 2021-02-25 ENCOUNTER — TELEPHONE (OUTPATIENT)
Dept: FAMILY MEDICINE | Facility: CLINIC | Age: 76
End: 2021-02-25

## 2021-02-25 ENCOUNTER — MEDICAL CORRESPONDENCE (OUTPATIENT)
Dept: FAMILY MEDICINE | Facility: CLINIC | Age: 76
End: 2021-02-25

## 2021-02-25 NOTE — TELEPHONE ENCOUNTER
Received fax from pharmacy requesting PA for Incruse Elipta.   Submitted through [email or web address] 02/24.  Today PA was approved.  Approved until 12/31/2021.  Called pharmacy to inform of approval.

## 2021-03-15 DIAGNOSIS — J44.1 COPD EXACERBATION (H): ICD-10-CM

## 2021-03-22 DIAGNOSIS — J44.1 COPD EXACERBATION (H): ICD-10-CM

## 2021-04-30 DIAGNOSIS — Z51.5 END OF LIFE CARE: ICD-10-CM

## 2021-05-02 RX ORDER — LORAZEPAM 2 MG/ML
.25-.5 CONCENTRATE ORAL EVERY 4 HOURS PRN
Qty: 30 ML | Refills: 0 | Status: SHIPPED | OUTPATIENT
Start: 2021-05-02 | End: 2021-05-10

## 2021-05-10 DIAGNOSIS — Z51.5 END OF LIFE CARE: ICD-10-CM

## 2021-05-10 RX ORDER — LORAZEPAM 2 MG/ML
.25-.5 CONCENTRATE ORAL EVERY 4 HOURS PRN
Qty: 30 ML | Refills: 3 | Status: SHIPPED | OUTPATIENT
Start: 2021-05-10

## 2021-07-14 DIAGNOSIS — J44.1 COPD EXACERBATION (H): ICD-10-CM
